# Patient Record
Sex: FEMALE | Race: WHITE | NOT HISPANIC OR LATINO | Employment: OTHER | URBAN - METROPOLITAN AREA
[De-identification: names, ages, dates, MRNs, and addresses within clinical notes are randomized per-mention and may not be internally consistent; named-entity substitution may affect disease eponyms.]

---

## 2017-01-12 ENCOUNTER — ALLSCRIPTS OFFICE VISIT (OUTPATIENT)
Dept: OTHER | Facility: OTHER | Age: 77
End: 2017-01-12

## 2017-01-12 DIAGNOSIS — E11.65 TYPE 2 DIABETES MELLITUS WITH HYPERGLYCEMIA (HCC): ICD-10-CM

## 2017-01-17 ENCOUNTER — ALLSCRIPTS OFFICE VISIT (OUTPATIENT)
Dept: OTHER | Facility: OTHER | Age: 77
End: 2017-01-17

## 2017-01-24 ENCOUNTER — GENERIC CONVERSION - ENCOUNTER (OUTPATIENT)
Dept: OTHER | Facility: OTHER | Age: 77
End: 2017-01-24

## 2017-02-08 ENCOUNTER — ALLSCRIPTS OFFICE VISIT (OUTPATIENT)
Dept: OTHER | Facility: OTHER | Age: 77
End: 2017-02-08

## 2017-02-10 ENCOUNTER — GENERIC CONVERSION - ENCOUNTER (OUTPATIENT)
Dept: OTHER | Facility: OTHER | Age: 77
End: 2017-02-10

## 2017-02-10 ENCOUNTER — APPOINTMENT (OUTPATIENT)
Dept: LAB | Facility: CLINIC | Age: 77
End: 2017-02-10
Payer: MEDICARE

## 2017-02-10 DIAGNOSIS — E11.65 TYPE 2 DIABETES MELLITUS WITH HYPERGLYCEMIA (HCC): ICD-10-CM

## 2017-02-10 LAB
ALBUMIN SERPL BCP-MCNC: 3.2 G/DL (ref 3.5–5)
ALP SERPL-CCNC: 53 U/L (ref 46–116)
ALT SERPL W P-5'-P-CCNC: 18 U/L (ref 12–78)
ANION GAP SERPL CALCULATED.3IONS-SCNC: 10 MMOL/L (ref 4–13)
AST SERPL W P-5'-P-CCNC: 8 U/L (ref 5–45)
BILIRUB SERPL-MCNC: 0.7 MG/DL (ref 0.2–1)
BUN SERPL-MCNC: 8 MG/DL (ref 5–25)
CALCIUM SERPL-MCNC: 8.5 MG/DL (ref 8.3–10.1)
CHLORIDE SERPL-SCNC: 103 MMOL/L (ref 100–108)
CHOLEST SERPL-MCNC: 159 MG/DL (ref 50–200)
CO2 SERPL-SCNC: 26 MMOL/L (ref 21–32)
CREAT SERPL-MCNC: 0.57 MG/DL (ref 0.6–1.3)
EST. AVERAGE GLUCOSE BLD GHB EST-MCNC: 166 MG/DL
GFR SERPL CREATININE-BSD FRML MDRD: >60 ML/MIN/1.73SQ M
GLUCOSE SERPL-MCNC: 186 MG/DL (ref 65–140)
HBA1C MFR BLD: 7.4 % (ref 4.2–6.3)
HDLC SERPL-MCNC: 79 MG/DL (ref 40–60)
LDLC SERPL CALC-MCNC: 72 MG/DL (ref 0–100)
POTASSIUM SERPL-SCNC: 3.8 MMOL/L (ref 3.5–5.3)
PROT SERPL-MCNC: 6.7 G/DL (ref 6.4–8.2)
SODIUM SERPL-SCNC: 139 MMOL/L (ref 136–145)
TRIGL SERPL-MCNC: 42 MG/DL

## 2017-02-10 PROCEDURE — 80061 LIPID PANEL: CPT

## 2017-02-10 PROCEDURE — 83036 HEMOGLOBIN GLYCOSYLATED A1C: CPT

## 2017-02-10 PROCEDURE — 36415 COLL VENOUS BLD VENIPUNCTURE: CPT

## 2017-02-10 PROCEDURE — 80053 COMPREHEN METABOLIC PANEL: CPT

## 2017-02-17 ENCOUNTER — ALLSCRIPTS OFFICE VISIT (OUTPATIENT)
Dept: OTHER | Facility: OTHER | Age: 77
End: 2017-02-17

## 2017-02-17 DIAGNOSIS — Z13.820 ENCOUNTER FOR SCREENING FOR OSTEOPOROSIS: ICD-10-CM

## 2017-02-17 DIAGNOSIS — E11.65 TYPE 2 DIABETES MELLITUS WITH HYPERGLYCEMIA (HCC): ICD-10-CM

## 2017-02-17 DIAGNOSIS — Z12.31 ENCOUNTER FOR SCREENING MAMMOGRAM FOR MALIGNANT NEOPLASM OF BREAST: ICD-10-CM

## 2017-03-08 ENCOUNTER — GENERIC CONVERSION - ENCOUNTER (OUTPATIENT)
Dept: OTHER | Facility: OTHER | Age: 77
End: 2017-03-08

## 2017-03-16 ENCOUNTER — GENERIC CONVERSION - ENCOUNTER (OUTPATIENT)
Dept: OTHER | Facility: OTHER | Age: 77
End: 2017-03-16

## 2017-04-06 ENCOUNTER — GENERIC CONVERSION - ENCOUNTER (OUTPATIENT)
Dept: OTHER | Facility: OTHER | Age: 77
End: 2017-04-06

## 2017-04-12 ENCOUNTER — GENERIC CONVERSION - ENCOUNTER (OUTPATIENT)
Dept: OTHER | Facility: OTHER | Age: 77
End: 2017-04-12

## 2017-04-21 ENCOUNTER — APPOINTMENT (OUTPATIENT)
Dept: LAB | Facility: CLINIC | Age: 77
End: 2017-04-21
Payer: MEDICARE

## 2017-04-21 ENCOUNTER — GENERIC CONVERSION - ENCOUNTER (OUTPATIENT)
Dept: OTHER | Facility: OTHER | Age: 77
End: 2017-04-21

## 2017-04-21 DIAGNOSIS — E11.65 TYPE 2 DIABETES MELLITUS WITH HYPERGLYCEMIA (HCC): ICD-10-CM

## 2017-04-21 LAB
ALBUMIN SERPL BCP-MCNC: 3 G/DL (ref 3.5–5)
ALP SERPL-CCNC: 48 U/L (ref 46–116)
ALT SERPL W P-5'-P-CCNC: 21 U/L (ref 12–78)
ANION GAP SERPL CALCULATED.3IONS-SCNC: 8 MMOL/L (ref 4–13)
AST SERPL W P-5'-P-CCNC: 8 U/L (ref 5–45)
BILIRUB SERPL-MCNC: 0.66 MG/DL (ref 0.2–1)
BUN SERPL-MCNC: 10 MG/DL (ref 5–25)
CALCIUM SERPL-MCNC: 8.6 MG/DL (ref 8.3–10.1)
CHLORIDE SERPL-SCNC: 103 MMOL/L (ref 100–108)
CO2 SERPL-SCNC: 26 MMOL/L (ref 21–32)
CREAT SERPL-MCNC: 0.53 MG/DL (ref 0.6–1.3)
EST. AVERAGE GLUCOSE BLD GHB EST-MCNC: 151 MG/DL
GFR SERPL CREATININE-BSD FRML MDRD: >60 ML/MIN/1.73SQ M
GLUCOSE P FAST SERPL-MCNC: 117 MG/DL (ref 65–99)
HBA1C MFR BLD: 6.9 % (ref 4.2–6.3)
POTASSIUM SERPL-SCNC: 4 MMOL/L (ref 3.5–5.3)
PROT SERPL-MCNC: 6.5 G/DL (ref 6.4–8.2)
SODIUM SERPL-SCNC: 137 MMOL/L (ref 136–145)

## 2017-04-21 PROCEDURE — 36415 COLL VENOUS BLD VENIPUNCTURE: CPT

## 2017-04-21 PROCEDURE — 80053 COMPREHEN METABOLIC PANEL: CPT

## 2017-04-21 PROCEDURE — 83036 HEMOGLOBIN GLYCOSYLATED A1C: CPT

## 2017-04-25 ENCOUNTER — ALLSCRIPTS OFFICE VISIT (OUTPATIENT)
Dept: OTHER | Facility: OTHER | Age: 77
End: 2017-04-25

## 2017-04-25 DIAGNOSIS — E11.65 TYPE 2 DIABETES MELLITUS WITH HYPERGLYCEMIA (HCC): ICD-10-CM

## 2017-05-16 ENCOUNTER — ALLSCRIPTS OFFICE VISIT (OUTPATIENT)
Dept: OTHER | Facility: OTHER | Age: 77
End: 2017-05-16

## 2017-08-02 ENCOUNTER — GENERIC CONVERSION - ENCOUNTER (OUTPATIENT)
Dept: OTHER | Facility: OTHER | Age: 77
End: 2017-08-02

## 2017-08-09 ENCOUNTER — ALLSCRIPTS OFFICE VISIT (OUTPATIENT)
Dept: OTHER | Facility: OTHER | Age: 77
End: 2017-08-09

## 2017-08-09 DIAGNOSIS — E11.65 TYPE 2 DIABETES MELLITUS WITH HYPERGLYCEMIA (HCC): ICD-10-CM

## 2017-10-18 ENCOUNTER — GENERIC CONVERSION - ENCOUNTER (OUTPATIENT)
Dept: OTHER | Facility: OTHER | Age: 77
End: 2017-10-18

## 2017-11-13 ENCOUNTER — APPOINTMENT (OUTPATIENT)
Dept: LAB | Facility: CLINIC | Age: 77
End: 2017-11-13
Payer: MEDICARE

## 2017-11-13 ENCOUNTER — GENERIC CONVERSION - ENCOUNTER (OUTPATIENT)
Dept: OTHER | Facility: OTHER | Age: 77
End: 2017-11-13

## 2017-11-13 DIAGNOSIS — E11.65 TYPE 2 DIABETES MELLITUS WITH HYPERGLYCEMIA (HCC): ICD-10-CM

## 2017-11-13 LAB
ALBUMIN SERPL BCP-MCNC: 3.3 G/DL (ref 3.5–5)
ALP SERPL-CCNC: 57 U/L (ref 46–116)
ALT SERPL W P-5'-P-CCNC: 15 U/L (ref 12–78)
ANION GAP SERPL CALCULATED.3IONS-SCNC: 8 MMOL/L (ref 4–13)
AST SERPL W P-5'-P-CCNC: 14 U/L (ref 5–45)
BASOPHILS # BLD AUTO: 0.04 THOUSANDS/ΜL (ref 0–0.1)
BASOPHILS NFR BLD AUTO: 1 % (ref 0–1)
BILIRUB SERPL-MCNC: 0.82 MG/DL (ref 0.2–1)
BUN SERPL-MCNC: 12 MG/DL (ref 5–25)
CALCIUM SERPL-MCNC: 9.2 MG/DL (ref 8.3–10.1)
CHLORIDE SERPL-SCNC: 101 MMOL/L (ref 100–108)
CHOLEST SERPL-MCNC: 157 MG/DL (ref 50–200)
CO2 SERPL-SCNC: 26 MMOL/L (ref 21–32)
CREAT SERPL-MCNC: 0.7 MG/DL (ref 0.6–1.3)
EOSINOPHIL # BLD AUTO: 0.08 THOUSAND/ΜL (ref 0–0.61)
EOSINOPHIL NFR BLD AUTO: 1 % (ref 0–6)
ERYTHROCYTE [DISTWIDTH] IN BLOOD BY AUTOMATED COUNT: 15.4 % (ref 11.6–15.1)
EST. AVERAGE GLUCOSE BLD GHB EST-MCNC: 146 MG/DL
GFR SERPL CREATININE-BSD FRML MDRD: 83 ML/MIN/1.73SQ M
GLUCOSE P FAST SERPL-MCNC: 184 MG/DL (ref 65–99)
HBA1C MFR BLD: 6.7 % (ref 4.2–6.3)
HCT VFR BLD AUTO: 39.1 % (ref 34.8–46.1)
HDLC SERPL-MCNC: 74 MG/DL (ref 40–60)
HGB BLD-MCNC: 12.8 G/DL (ref 11.5–15.4)
LDLC SERPL CALC-MCNC: 65 MG/DL (ref 0–100)
LYMPHOCYTES # BLD AUTO: 1.92 THOUSANDS/ΜL (ref 0.6–4.47)
LYMPHOCYTES NFR BLD AUTO: 31 % (ref 14–44)
MCH RBC QN AUTO: 30.1 PG (ref 26.8–34.3)
MCHC RBC AUTO-ENTMCNC: 32.7 G/DL (ref 31.4–37.4)
MCV RBC AUTO: 92 FL (ref 82–98)
MONOCYTES # BLD AUTO: 0.47 THOUSAND/ΜL (ref 0.17–1.22)
MONOCYTES NFR BLD AUTO: 8 % (ref 4–12)
NEUTROPHILS # BLD AUTO: 3.67 THOUSANDS/ΜL (ref 1.85–7.62)
NEUTS SEG NFR BLD AUTO: 59 % (ref 43–75)
NRBC BLD AUTO-RTO: 0 /100 WBCS
PLATELET # BLD AUTO: 274 THOUSANDS/UL (ref 149–390)
PMV BLD AUTO: 10.2 FL (ref 8.9–12.7)
POTASSIUM SERPL-SCNC: 4.2 MMOL/L (ref 3.5–5.3)
PROT SERPL-MCNC: 7 G/DL (ref 6.4–8.2)
RBC # BLD AUTO: 4.25 MILLION/UL (ref 3.81–5.12)
SODIUM SERPL-SCNC: 135 MMOL/L (ref 136–145)
TRIGL SERPL-MCNC: 89 MG/DL
WBC # BLD AUTO: 6.22 THOUSAND/UL (ref 4.31–10.16)

## 2017-11-13 PROCEDURE — 80053 COMPREHEN METABOLIC PANEL: CPT

## 2017-11-13 PROCEDURE — 85025 COMPLETE CBC W/AUTO DIFF WBC: CPT

## 2017-11-13 PROCEDURE — 83036 HEMOGLOBIN GLYCOSYLATED A1C: CPT

## 2017-11-13 PROCEDURE — 36415 COLL VENOUS BLD VENIPUNCTURE: CPT

## 2017-11-13 PROCEDURE — 80061 LIPID PANEL: CPT

## 2017-11-16 ENCOUNTER — ALLSCRIPTS OFFICE VISIT (OUTPATIENT)
Dept: OTHER | Facility: OTHER | Age: 77
End: 2017-11-16

## 2017-11-16 DIAGNOSIS — E78.5 HYPERLIPIDEMIA: ICD-10-CM

## 2017-11-17 NOTE — PROGRESS NOTES
Assessment    1  Diabetes mellitus type 2, uncontrolled (250 02) (E11 65)   2  Hypertension (401 9) (I10)   3  Hyperlipidemia (272 4) (E78 5)    Plan  Hyperlipidemia    · (1) CBC/PLT/DIFF; Status:Active; Requested for:16Nov2017;    · (1) COMPREHENSIVE METABOLIC PANEL; Status:Active; Requested for:16Nov2017;    · (1) LIPID PANEL, FASTING; Status:Active; Requested for:16Nov2017;    · Follow-up visit in 4 Months Evaluation and Treatment  Follow-up  Status: Hold For - Scheduling Requested for: 66DKH8035  Need for pneumococcal vaccination    · Pneumovax 23 25 MCG/0 5ML Injection Injectable    Discussion/Summary  Discussion Summary:   Chronic problems are stable  No changes in meds  Followup with specialists  Continue diet and exercise  Counseling Documentation With Imm: The patient was counseled regarding instructions for management,-- impressions  Medication SE Review and Pt Understands Tx: Possible side effects of new medications were reviewed with the patient/guardian today  The treatment plan was reviewed with the patient/guardian  The patient/guardian understands and agrees with the treatment plan   Self Referrals:   Self Referrals: No      Chief Complaint  Chief Complaint Free Text Note Form: Patient is here today for a routine follow up and lab review  History of Present Illness  HPI: Patient was in today for routine follow-up  Her blood work shows her sugars are doing even better  They're now in the normal range  Her blood pressure remains controlled  Cholesterol is controlled on her blood work  She is trying to watch her diet but she admits she still cheats because she does not want to lose further weight  Her stomach continues to do well since the procedure  Review of Systems  Complete-Female:  Cardiovascular: no chest pain  Respiratory: no shortness of breath  Gastrointestinal: no abdominal pain  Active Problems  1   Allergic rhinitis (477 9) (J30 9)   2  Carotid stenosis, bilateral (433 10,433 30) (I65 23)   3  Carpal tunnel syndrome on both sides (354 0) (G56 03)   4  Cerebral infarction (434 91) (I63 9)   5  Chronic pain (338 29) (G89 29)   6  Depression (311) (F32 9)   7  Diabetes mellitus type 2, uncontrolled (250 02) (E11 65)   8  Diabetic peripheral neuropathy (250 60,357 2) (E11 42)   9  Dry mouth (527 7) (R68 2)   10  Encounter for long-term (current) drug use (V58 69) (Z79 899)   11  Epilepsy (345 90) (G40 909)   12  Gait disturbance (781 2) (R26 9)   13  History of CVA (cerebrovascular accident) (V12 54) (Z86 73)   14  Hyperlipidemia (272 4) (E78 5)   15  Hypertension (401 9) (I10)   16  Leg pain (729 5) (M79 606)   17  Mesenteric ischemia (557 9) (K55 9)   18  Need for pneumococcal vaccination (V03 82) (Z23)   19  Need for Tdap vaccination (V06 1) (Z23)   20  Peripheral neuropathy (356 9) (G62 9)   21  Seborrheic keratosis (702 19) (L82 1)   22  Skin lesion (709 9) (L98 9)   23  Trigger little finger of right hand (727 03) (M65 351)   24  Urinary incontinence in female (788 30) (R32)   25  Vertigo (780 4) (R42)   26  Visit for screening mammogram (V76 12) (Z12 31)   27  Vitamin D deficiency (268 9) (E55 9)    Past Medical History  1  History of Diverticulosis of large intestine without hemorrhage (562 10) (K57 30)   2  History of colonic polyps (V12 72) (Z86 010)   3  History of diarrhea (V12 79) (Z87 898)   4  History of Screening for genitourinary condition (V81 6) (Z13 89)   5  History of Screening for osteoporosis (V82 81) (Z13 820)   6  History of Screening for skin condition (V82 0) (Z13 89)  Active Problems And Past Medical History Reviewed: The active problems and past medical history were reviewed and updated today  Surgical History  1  History of Breast Surgery Lumpectomy   2  History of Cath Stent Placement   3  History of Cath Stent Placement   4  History of Coronary Artery Triple Arterial Bypass Graft   5  History of Endarterectomy Carotid Artery   6   History of Tonsillectomy    Family History  Mother    1  Family history of lung cancer (V16 1) (Z80 1)   2  Family history of Headache  Brother    3  Family history of cerebrovascular accident (CVA) (V17 1) (Z82 3)    Social History     · Does not use illicit drugs (C18 27) (Z78 9)   · Former smoker (L83 43) (W79 696)   · Retired   ·     Current Meds   1  AmLODIPine Besylate 5 MG Oral Tablet; take 1 tablet by mouth once daily; Therapy: 51HQB4898 to (Evaluate:34Tju4536)  Requested for: 37JKP1160; Last Rx:14Nov2017 Ordered   2  Aspirin 81 MG TABS; Therapy: (Recorded:33Lso8813) to Recorded   3  Clopidogrel Bisulfate 75 MG Oral Tablet; take 1 tablet by mouth once daily; Therapy: 13CCB4282 to (Evaluate:34Rzd4217)  Requested for: 29SHG8836; Last Rx:14Nov2017 Ordered   4  Colon Herbal Cleanser Oral Capsule; TAKE 1 CAPSULE DAILY; Therapy: 02Jun2016 to Recorded   5  CoQ-10 CAPS; Therapy: (Recorded:19Nov2015) to Recorded   6  DULoxetine HCl - 60 MG Oral Capsule Delayed Release Particles; TAKE 1 CAPSULE TWICE DAILY; Therapy: 48Mvr1015 to (Evaluate:37Vvk1466)  Requested for: 95Sez2385; Last Rx:87Vnf4536 Ordered   7  Fiber POWD; Therapy: (Recorded:16May2017) to Recorded   8  Gabapentin 100 MG Oral Capsule; take 1 capsule by mouth three times a day; Therapy: 80Egn0075 to (Evaluate:63Eue7213)  Requested for: 15Oct2017; Last Rx:15Oct2017 Ordered   9  Glimepiride 4 MG Oral Tablet; take 1 tablet twice a day; Therapy: 93Dzz8500 to (Evaluate:01Ndy7166)  Requested for: 46JJY2854; Last Rx:18Jun2017 Ordered   10  Hydrocodone-Acetaminophen 5-325 MG Oral Tablet; TAKE 1 TABLET EVERY 6 HOURS AS NEEDED; Therapy: 77SOY2452 to (Evaluate:55Wmz9475); Last Rx:20Jan2015 Ordered   11  Lisinopril 20 MG Oral Tablet; take 1 tablet by mouth once daily; Therapy: 43TDN7678 to (Evaluate:33Dag1257)  Requested for: 02QYH8605; Last Rx:14Nov2017  Ordered   12  MetFORMIN HCl - 500 MG Oral Tablet; take 2 tablets by mouth twice a day;   Therapy: 78CXG0687 to (MBBNZURK:11NZD7764)  Requested for: 97WQV1322; Last Rx:34Wuq0957  Ordered   13  Simvastatin 40 MG Oral Tablet; take 1 tablet by mouth once daily; Therapy: 11YCE2131 to (Evaluate:23Osy6587)  Requested for: 54KSD7386; Last Rx:87Rpr9250  Ordered   14  VESIcare 5 MG Oral Tablet; take 1 tablet by mouth once daily; Therapy: 90GKW6347 to (Evaluate:99Uye7907)  Requested for: 41UHT2574; Last Rx:67Gla8716  Ordered   15  Vitamin B12 TABS; Therapy: (Recorded:59Pic3534) to Recorded   16  Vitamin D3 2000 UNIT Oral Tablet; Therapy: (Recorded:19Zyx9860) to Recorded  Medication List Reviewed: The medication list was reviewed and updated today  Allergies  1  Darvon CAPS   2  Penicillin V Potassium TABS  3  No Known Environmental Allergies   4  No Known Food Allergies    Vitals  Vital Signs    Recorded: 46HAE5046 12:20PM Recorded: 47WPI6089 11:34AM   Temperature  96 8 F   Heart Rate  61   Systolic  994   Diastolic  70   Height  4 ft 11 in   Weight  123 lb    BMI Calculated  24 84   BSA Calculated  1 5   O2 Saturation 95 84       Physical Exam   Constitutional  General appearance: No acute distress, well appearing and well nourished  Pulmonary  Respiratory effort: No increased work of breathing or signs of respiratory distress  Auscultation of lungs: Clear to auscultation  Cardiovascular  Auscultation of heart: Normal rate and rhythm, normal S1 and S2, without murmurs  Examination of extremities for edema and/or varicosities: Normal    Abdomen  Abdomen: Non-tender, no masses  Liver and spleen: No hepatomegaly or splenomegaly  Psychiatric  Orientation to person, place, and time: Normal    Mood and affect: Normal          Health Management  Visit for screening mammogram   Digital Bilateral Screening Mammogram With CAD; every 1 year; Last 26Feb2015; Next [facility]:74AFQ4508;  Overdue    Signatures   Electronically signed by : Carla Migeul MD; Nov 16 2017 12:23PM EST                       (Author)

## 2018-01-11 NOTE — PROGRESS NOTES
Assessment    1  Encounter for preventive health examination (V70 0) (Z00 00)    Plan   Diabetes mellitus type 2, uncontrolled    · GlyBURIDE 5 MG Oral Tablet; TAKE 2 TABLETS TWICE DAILY  Health Maintenance    · Nicotine Step 3 7 MG/24HR Transdermal Patch 24 Hour; APPLY 1 PATCH DAILY AS  DIRECTED    Diabetes mellitus type 2, uncontrolled (250 02) (E11 65)          Discussion/Summary  Impression: Subsequent Annual Wellness Visit, with preventive exam as well as age and risk appropriate counseling completed  Cardiovascular screening and counseling: screening is current  Diabetes screening and counseling: screening is current  Colorectal cancer screening and counseling: screening not indicated  Breast cancer screening and counseling: screening is current  Cervical cancer screening and counseling: screening not indicated  Glaucoma screening and counseling: screening is current  Immunizations: influenza vaccine is up to date this year and the lifetime pneumococcal vaccine has been completed  Advance Directive Planning: she was encouraged to follow-up with me to discuss her questions and/or decisions, Asked her to bring us a copy of her living will to scan into the chart  Patient Discussion: plan discussed with the patient, follow-up visit needed in one year  Self Referrals: No      Chief Complaint  Medicare Wellness      Advance Directives  Advance Directive [facility]:   YES - Patient has an advance health care directive  The patient has a living will located  in patient's home  History of Present Illness  The patient is being seen for the initial annual wellness visit and Her questionnaire was reviewed with her and a copy is in the chart  Medicare Screening and Risk Factors   Hospitalizations: no previous hospitalizations  Medicare Screening Tests Risk Questions   Drug and Alcohol Use: The patient is a current cigarette smoker  The patient reports occasional alcohol use   She has never used illicit drugs  Diet and Physical Activity: Current diet includes well balanced meals and low salt food choices  She exercises 7 times per week  Exercise: walking, strength training  Mood Disorder and Cognitive Impairment Screening: She denies feeling down, depressed, or hopeless over the past two weeks  She denies feeling little interest or pleasure in doing things over the past two weeks  Cognitive impairment screening: denies difficulty learning/retaining new information, denies difficulty handling complex tasks, denies difficulty with reasoning, denies difficulty with spatial ability and orientation, denies difficulty with language and denies difficulty with behavior  Functional Ability/Level of Safety: She denies hearing difficulties  Activities of daily living details: does not need help using the phone, no transportation help needed, does not need help shopping, no meal preparation help needed, does not need help doing housework, does not need help doing laundry, does not need help managing medications and does not need help managing money  Fall risk factors: The patient fell 1 times in the past 12 months  Home safety risk factors:  no grab bars in the bathroom and no handrails on the stairs, but no unfamiliar surroundings, no loose rugs, no poor household lighting, no uneven floors and no household clutter  Advance Directives: Advance directives: living will, but no durable power of  for health care directives and no advance directives  end of life decisions were reviewed with the patient  Co-Managers and Medical Equipment/Suppliers: See Patient Care Team   Falls Risk: The patient fell 1 times in the past 12 months  The patient currently has no urinary incontinence symptoms  Patient Care Team    Care Team Member Role Specialty Office Number   Angelica Chen MD Specialist Neurology [phone]   Radha ARTHUR    Dermatology [phone]   Jaime Nelson   [phone]     Review of Systems    Cardiovascular: no chest pain  Respiratory: no shortness of breath  Active Problems     1  Allergic rhinitis (477 9) (J30 9)   2  Carotid stenosis, bilateral (433 10,433 30) (I65 23)   3  Chronic pain (338 29) (G89 29)   4  Depression (311) (F32 9)   5  Dry mouth (527 7) (R68 2)   6  Encounter for long-term (current) drug use (V58 69) (Z79 899)   7  Epilepsy (345 90) (G40 909)   8  Gait disturbance (781 2) (R26 9)   9  Leg pain (729 5) (M79 606)   10  Need for pneumococcal vaccination (V03 82) (Z23)   11  Need for Tdap vaccination (V06 1) (Z23)   12  Peripheral neuropathy (356 9) (G62 9)   13  Screening for genitourinary condition (V81 6) (Z13 89)   14  Screening for skin condition (V82 0) (Z13 89)   15  Seborrheic keratosis (702 19) (L82 1)   16  Skin lesion (709 9) (L98 9)   17  Trigger little finger of right hand (727 03) (M65 351)   18  Urinary incontinence in female (788 30) (R32)   19  Vertigo (780 4) (R42)   20  Visit for screening mammogram (V76 12) (Z12 31)   21  Vitamin D deficiency (268 9) (E55 9)    Hypertension (401 9) (I10)       Hyperlipidemia (272 4) (E78 5)       Diabetes mellitus type 2, uncontrolled (250 02) (E11 65)       Cerebral infarction (434 91) (I63 9)     - L frontal on MRI 5/16          Past Medical History    1  History of Diverticulosis of large intestine without hemorrhage (562 10) (K57 30)   2  History of colonic polyps (V12 72) (Z86 010)   3  History of diarrhea (V12 79) (Z87 898)    The active problems and past medical history were reviewed and updated today  Surgical History    1  History of Breast Surgery Lumpectomy   2  History of Cath Stent Placement   3  History of Coronary Artery Triple Arterial Bypass Graft   4  History of Endarterectomy Carotid Artery   5  History of Tonsillectomy    The surgical history was reviewed and updated today  Family History  Mother    1  Family history of lung cancer (V16 1) (Z80 1)   2   Family history of Headache  Brother    3  Family history of cerebrovascular accident (CVA) (V17 1) (Z82 3)    The family history was reviewed and updated today  Social History    · Retired   ·   The social history was reviewed and updated today  Current Meds   1  AmLODIPine Besylate 5 MG Oral Tablet; take 1 tablet by mouth once daily; Therapy: 71MVP9108 to (Evaluate:05Qga9673)  Requested for: 46KKS3561; Last   Rx:23Oct2016 Ordered   2  Aspirin 81 MG TABS; Therapy: (Recorded:06Elw6001) to Recorded   3  Cilostazol 100 MG Oral Tablet; TAKE 1 TABLET TWICE DAILY; Therapy: 00QJO8344 to (Evaluate:78Hgk3827)  Requested for: 42Fvc6434; Last   Rx:37Mkh9040; Status: ACTIVE - Transmit to Pharmacy - Awaiting Verification Ordered   4  Clopidogrel Bisulfate 75 MG Oral Tablet; take one tablet by mouth daily; Therapy: 22IQS2076 to (Evaluate:14Oct2016)  Requested for: 47Cie4206; Last   Rx:96Xer3988 Ordered   5  Colon Herbal Cleanser Oral Capsule; TAKE 1 CAPSULE DAILY; Therapy: 02Jun2016 to Recorded   6  CoQ-10 CAPS; Therapy: (Recorded:79Wdr0689) to Recorded   7  DULoxetine HCl - 60 MG Oral Capsule Delayed Release Particles; TAKE 1 CAPSULE   TWICE DAILY; Therapy: 11Qkq1913 to (Evaluate:54Opq8272)  Requested for: 65Iuc7663; Last   Rx:88Ygl6292; Status: ACTIVE - Transmit to Pharmacy - Awaiting Verification Ordered   8  Gabapentin 100 MG Oral Capsule; take 1 capsule by mouth three times a day; Therapy: 28Qjr7405 to (Evaluate:32Jvz6583)  Requested for: 23Oct2016; Last   Rx:23Oct2016 Ordered   9  GlyBURIDE 5 MG Oral Tablet; TAKE 1 TABLET 3 times daily; Therapy: 87SHZ0505 to (Evaluate:00Kjf0545)  Requested for: 23Eiz8811; Last   Rx:87Ris7554; Status: ACTIVE - Transmit to [facility] Ordered   10  Hydrocodone-Acetaminophen 5-325 MG Oral Tablet; TAKE 1 TABLET EVERY 6 HOURS    AS NEEDED; Therapy: 24NRT1912 to (Evaluate:89Pim7538); Last Rx:20Jan2015 Ordered   11   Lisinopril 20 MG Oral Tablet; take 1 tablet by mouth once daily; Therapy: 57BDR7864 to (Evaluate:21Apr2017)  Requested for: 23Oct2016; Last    Rx:23Oct2016 Ordered   12  MetFORMIN HCl - 500 MG Oral Tablet; take 2 tablets by mouth twice a day; Therapy: 87KJD0050 to (Evaluate:21Mar2017)  Requested for: 21Nov2016; Last    Rx:21Nov2016 Ordered   13  Simvastatin 40 MG Oral Tablet; take 1 tablet by mouth daily; Therapy: 05KXK7751 to (Evaluate:21Apr2017)  Requested for: 23Oct2016; Last    Rx:23Oct2016 Ordered   14  VESIcare 5 MG Oral Tablet; Take 1 tablet daily; Therapy: 27GJU9113 to (Leah Sera)  Requested for: 09Aug2016; Last    Rx:62Hri3380 Ordered   15  Vitamin B12 TABS; Therapy: (Recorded:19Nov2015) to Recorded   16  Vitamin D3 2000 UNIT Oral Tablet; Therapy: (Recorded:29Epm1086) to Recorded    The medication list was reviewed and updated today  Allergies    1  Darvon CAPS   2  Penicillin V Potassium TABS    Immunizations  Influenza --- Earl Morrison: Oct 2015   PCV --- Earl Morrison: 14-Oct-2015   Tdap --- Earl Morrison: Temporarily Deferred: Pt refuses, As of: 22OKL6753, Defer for 1 Years     Vitals  Signs   Recorded: 23Nov2016 01:41PM   Heart Rate: 021  Systolic: 816  Diastolic: 68  Height: 4 ft 10 in  Weight: 142 lb 6 08 oz  BMI Calculated: 29 76  BSA Calculated: 1 57  O2 Saturation: 95    Health Management  Visit for screening mammogram   Digital Bilateral Screening Mammogram With CAD; every 1 year; Last 26Feb2015; Next Due:  24Zot6995;  Overdue    Future Appointments    Date/Time Provider Specialty Site   12/27/2016 01:20 PM Lory Burnett MD Internal Medicine [facility] [address] INTERNAL MED   01/17/2017 01:35 PM Oleh Claude, MD Neurology [facility] [address]   Electronically signed by : Nona Breaux MD; Nov 23 2016  2:22PM EST                       (Author)

## 2018-01-11 NOTE — RESULT NOTES
Verified Results  (1) COMPREHENSIVE METABOLIC PANEL 89NKY6414 51:53RG Stefan Lousi Order Number: [id number]     Test Name Result Flag Reference   SODIUM 137 mmol/L  136-145   POTASSIUM 4 0 mmol/L  3 5-5 3   CHLORIDE 103 mmol/L  100-108   CARBON DIOXIDE 26 mmol/L  21-32   ANION GAP (CALC) 8 mmol/L  4-13   BLOOD UREA NITROGEN 10 mg/dL  5-25   CREATININE 0 53 mg/dL L 0 60-1 30   Standardized to IDMS reference method   CALCIUM 8 6 mg/dL  8 3-10 1   BILI, TOTAL 0 66 mg/dL  0 20-1 00   ALK PHOSPHATAS 48 U/L     ALT (SGPT) 21 U/L  12-78   AST(SGOT) 8 U/L  5-45   ALBUMIN 3 0 g/dL L 3 5-5 0   TOTAL PROTEIN 6 5 g/dL  6 4-8 2   eGFR Non-African American      >60 0 ml/min/1 73sq [facility] Disease Education Program recommendations are as follows:  GFR calculation is accurate only with a steady state creatinine  Chronic Kidney disease less than 60 ml/min/1 73 sq  meters  Kidney failure less than 15 ml/min/1 73 sq  meters  GLUCOSE FASTING 117 mg/dL H 65-99     (1) HEMOGLOBIN A1C 21Apr2017 11:16AM Stefan Louis Order Number: [id number]     Test Name Result Flag Reference   HEMOGLOBIN A1C 6 9 % H 4 2-6 3   EST  AVG   GLUCOSE 151 mg/dl

## 2018-01-12 VITALS
BODY MASS INDEX: 24.8 KG/M2 | OXYGEN SATURATION: 77 % | SYSTOLIC BLOOD PRESSURE: 134 MMHG | HEART RATE: 71 BPM | HEIGHT: 59 IN | WEIGHT: 123 LBS | DIASTOLIC BLOOD PRESSURE: 60 MMHG

## 2018-01-13 VITALS
OXYGEN SATURATION: 95 % | WEIGHT: 131.25 LBS | HEART RATE: 91 BPM | DIASTOLIC BLOOD PRESSURE: 58 MMHG | HEIGHT: 58 IN | BODY MASS INDEX: 27.55 KG/M2 | SYSTOLIC BLOOD PRESSURE: 124 MMHG

## 2018-01-13 VITALS
HEIGHT: 59 IN | BODY MASS INDEX: 24.8 KG/M2 | TEMPERATURE: 96.8 F | HEART RATE: 61 BPM | DIASTOLIC BLOOD PRESSURE: 70 MMHG | SYSTOLIC BLOOD PRESSURE: 108 MMHG | WEIGHT: 123 LBS | OXYGEN SATURATION: 95 %

## 2018-01-13 NOTE — PROCEDURES
Results/Data    Procedure: Electromyogram and Nerve Conduction Study  Indication: Bilateral Upper Extremities   Referred by Dr Magali Coreas  The procedure's were discussed with the patient  Written consent was obtained prior to the procedure and is detailed in the patient's record  Prior to the start of the procedure a time out was taken and the identity of the patient was confirmed via name and date of birth with the patient  The correct site and the procedure to be performed were confirmed  The correct side was confirmed if applicable  The positioning of the patient was verified  The availability of the correct equipment was verified  Procedure Start Time: 11:30    Technique: A sterile concentric needle electrode was used  The patient tolerated the procedure well  There were no complications  Results : Motor and sensory nerve conduction studies were performed on the bilateral median and ulnar nerves  The right median motor terminal latency was prolonged with a normal compound motor action potential amplitude and a normal conduction velocity across the wrist  The left median motor terminal latency was prolonged with a normal compound motor action potential amplitude and a slow conduction velocity across the wrist  The right ulnar compound motor action potential was within normal limits  The left ulnar motor terminal latency was within normal limits with a normal compound motor action potential amplitude and a slow conduction velocity across the wrist but a normal conduction velocity across the elbow  The left median F wave was prolonged as compared to the right  The bilateral ulnar F wave latencies were within normal limits  The right median sensory peak latency was prolonged with a low sensory action potential amplitude  The left median sensory peak latency was prolonged with a low sensory action potential amplitude   The bilateral ulnar sensory peak latency was prolonged with a normal sensory action potential amplitude  The right median palmar evoked response was prolonged by 2 5 ms as compared to the right ulnar palmar evoked response at the same distance  The left median palmar evoked response was prolonged by 3 4 ms as compared to the left ulnar palmar evoked response at the same distance  Concentric needle examination was performed on various proximal and distal muscles bilaterally including deltoid, biceps, triceps, pronator teres, APB, FDI and low cervical paraspinals  There was no evidence of active denervation in any of the muscles tested  Mild-to-moderate decreased recruitment of giant motor units was noted in the bilateral abductor pollicis brevis  The compound motor unit action potentials were of normal configuration with interference patterns being full or full for effort in the remaining muscles tested  Interpretation: There is electrophysiologic evidence of a:    1  Bilateral moderate median nerve compression neuropathy at the wrist with demyelinative changes, left worse than the right,consistent with a diagnosis of carpal tunnel syndrome  2 Bilateral mild ulnar sensory neuropathy at the wrist with demyelinative changes as evidenced by the prolonged ulnar sensory peak latency  3  There is no evidence of a cervical radiculopathy bilaterally  4  A coexistent sensorimotor peripheral neuropathy with demyelinative changes cannot be completely excluded  Clinical correlation is recommended        Signatures   Electronically signed by : Danielle Wright MD; Feb 8 2017 12:25PM EST                       (Author)

## 2018-01-14 VITALS
OXYGEN SATURATION: 96 % | WEIGHT: 136.13 LBS | DIASTOLIC BLOOD PRESSURE: 60 MMHG | BODY MASS INDEX: 28.58 KG/M2 | SYSTOLIC BLOOD PRESSURE: 144 MMHG | HEIGHT: 58 IN | HEART RATE: 75 BPM

## 2018-01-14 VITALS
HEIGHT: 58 IN | HEART RATE: 92 BPM | BODY MASS INDEX: 27.5 KG/M2 | SYSTOLIC BLOOD PRESSURE: 100 MMHG | WEIGHT: 131 LBS | DIASTOLIC BLOOD PRESSURE: 48 MMHG

## 2018-01-14 VITALS
DIASTOLIC BLOOD PRESSURE: 60 MMHG | WEIGHT: 132.5 LBS | HEIGHT: 58 IN | HEART RATE: 80 BPM | SYSTOLIC BLOOD PRESSURE: 136 MMHG | BODY MASS INDEX: 27.81 KG/M2

## 2018-01-14 VITALS
HEART RATE: 80 BPM | BODY MASS INDEX: 25.4 KG/M2 | DIASTOLIC BLOOD PRESSURE: 72 MMHG | WEIGHT: 126 LBS | SYSTOLIC BLOOD PRESSURE: 126 MMHG | HEIGHT: 59 IN

## 2018-01-15 NOTE — RESULT NOTES
Verified Results  (1) CBC/PLT/DIFF [id number] 11:00AM Renetta Narvaez Order Number: [id number]     Test Name Result Flag Reference   WBC COUNT 6 22 Thousand/uL  4 31-10 16   RBC COUNT 4 25 Million/uL  3 81-5 12   HEMOGLOBIN 12 8 g/dL  11 5-15 4   HEMATOCRIT 39 1 %  34 8-46  1   MCV 92 fL  82-98   MCH 30 1 pg  26 8-34 3   MCHC 32 7 g/dL  31 4-37 4   RDW 15 4 % H 11 6-15 1   MPV 10 2 fL  8 9-12 7   PLATELET COUNT 817 Thousands/uL  149-390   nRBC AUTOMATED 0 /100 WBCs     NEUTROPHILS RELATIVE PERCENT 59 %  43-75   LYMPHOCYTES RELATIVE PERCENT 31 %  14-44   MONOCYTES RELATIVE PERCENT 8 %  4-12   EOSINOPHILS RELATIVE PERCENT 1 %  0-6   BASOPHILS RELATIVE PERCENT 1 %  0-1   NEUTROPHILS ABSOLUTE COUNT 3 67 Thousands/? ??L  1 85-7 62   LYMPHOCYTES ABSOLUTE COUNT 1 92 Thousands/? ??L  0 60-4 47   MONOCYTES ABSOLUTE COUNT 0 47 Thousand/? ??L  0 17-1 22   EOSINOPHILS ABSOLUTE COUNT 0 08 Thousand/? ??L  0 00-0 61   BASOPHILS ABSOLUTE COUNT 0 04 Thousands/? ??L  0 00-0 10     (1) COMPREHENSIVE METABOLIC PANEL [id number] 34:20YD Renetta Narvaez Order Number: [id number]     Test Name Result Flag Reference   SODIUM 135 mmol/L L 136-145   POTASSIUM 4 2 mmol/L  3 5-5 3   CHLORIDE 101 mmol/L  100-108   CARBON DIOXIDE 26 mmol/L  21-32   ANION GAP (CALC) 8 mmol/L  4-13   BLOOD UREA NITROGEN 12 mg/dL  5-25   CREATININE 0 70 mg/dL  0 60-1 30   Standardized to IDMS reference method   CALCIUM 9 2 mg/dL  8 3-10 1   BILI, TOTAL 0 82 mg/dL  0 20-1 00   ALK PHOSPHATAS 57 U/L     ALT (SGPT) 15 U/L  12-78   Specimen collection should occur prior to Sulfasalazine and/or Sulfapyridine administration due to the potential for falsely depressed results  AST(SGOT) 14 U/L  5-45   Specimen collection should occur prior to Sulfasalazine administration due to the potential for falsely depressed results     ALBUMIN 3 3 g/dL L 3 5-5 0   TOTAL PROTEIN 7 0 g/dL  6 4-8 2   eGFR 83 ml/min/1 73sq m     National Kidney Disease Education Program recommendations are as follows:  GFR calculation is accurate only with a steady state creatinine  Chronic Kidney disease less than 60 ml/min/1 73 sq  meters  Kidney failure less than 15 ml/min/1 73 sq  meters  GLUCOSE FASTING 184 mg/dL H 65-99   Specimen collection should occur prior to Sulfasalazine administration due to the potential for falsely depressed results  Specimen collection should occur prior to Sulfapyridine administration due to the potential for falsely elevated results  (1) HEMOGLOBIN A1C [id number] 11:00AM [employer] Order Number: [id number]     Test Name Result Flag Reference   HEMOGLOBIN A1C 6 7 % H 4 2-6 3   EST  AVG  GLUCOSE 146 mg/dl       (1) LIPID PANEL, FASTING [id number] 11:00AM [employer] Order Number: [id number]     Test Name Result Flag Reference   CHOLESTEROL 157 mg/dL     HDL,DIRECT 74 mg/dL H 40-60   Specimen collection should occur prior to Metamizole administration due to the potential for falsley depressed results  LDL CHOLESTEROL CALCULATED 65 mg/dL  0-100   Triglyceride:        Normal <150 mg/dl   Borderline High 150-199 mg/dl   High 200-499 mg/dl   Very High >499 mg/dl      Cholesterol:       Desirable <200 mg/dl    Borderline High 200-239 mg/dl    High >239 mg/dl      HDL Cholesterol:       High>59 mg/dL    Low <41 mg/dL      This screening LDL is a calculated result  It does not have the accuracy of the Direct Measured LDL in the monitoring of patients with hyperlipidemia and/or statin therapy  Direct Measure LDL (AWD270) must be ordered separately in these patients  TRIGLYCERIDES 89 mg/dL  <=150   Specimen collection should occur prior to N-Acetylcysteine or Metamizole administration due to the potential for falsely depressed results

## 2018-01-17 NOTE — RESULT NOTES
Verified Results  (1) HEMOGLOBIN A1C [id number] 12:06PM [employer] Order Number: [id number]     Test Name Result Flag Reference   HEMOGLOBIN A1C 7 4 % H 4 2-6 3   EST  AVG  GLUCOSE 166 mg/dl       (1) COMPREHENSIVE METABOLIC PANEL [id number] 18:19LK [employer] Order Number: [id number]     Test Name Result Flag Reference   GLUCOSE,RANDM 186 mg/dL H    If the patient is fasting, the ADA then defines impaired fasting glucose as > 100 mg/dL and diabetes as > or equal to 123 mg/dL  SODIUM 139 mmol/L  136-145   POTASSIUM 3 8 mmol/L  3 5-5 3   CHLORIDE 103 mmol/L  100-108   CARBON DIOXIDE 26 mmol/L  21-32   ANION GAP (CALC) 10 mmol/L  4-13   BLOOD UREA NITROGEN 8 mg/dL  5-25   CREATININE 0 57 mg/dL L 0 60-1 30   Standardized to IDMS reference method   CALCIUM 8 5 mg/dL  8 3-10 1   BILI, TOTAL 0 70 mg/dL  0 20-1 00   ALK PHOSPHATAS 53 U/L     ALT (SGPT) 18 U/L  12-78   AST(SGOT) 8 U/L  5-45   ALBUMIN 3 2 g/dL L 3 5-5 0   TOTAL PROTEIN 6 7 g/dL  6 4-8 2   eGFR Non-African American      >60 0 ml/min/1 73sq m   - Patient Instructions: This is a fasting blood test  Water,black tea or black  coffee only after 9:00pm the night before test Drink 2 glasses of water the morning of test   National Kidney Disease Education Program recommendations are as follows:  GFR calculation is accurate only with a steady state creatinine  Chronic Kidney disease less than 60 ml/min/1 73 sq  meters  Kidney failure less than 15 ml/min/1 73 sq  meters  (1) LIPID PANEL, FASTING [id number] 12:06PM [employer] Order Number: [id number]     Test Name Result Flag Reference   CHOLESTEROL 159 mg/dL     HDL,DIRECT 79 mg/dL H 40-60   Specimen collection should occur prior to Metamizole administration due to the potential for falsely depressed results  LDL CHOLESTEROL CALCULATED 72 mg/dL  0-100   - Patient Instructions:  This is a fasting blood test  Water,black tea or black  coffee only after 9:00pm the night before test   Drink 2 glasses of water the morning of test     - Patient Instructions: This is a fasting blood test  Water,black tea or black  coffee only after 9:00pm the night before test Drink 2 glasses of water the morning of test   Triglyceride:         Normal              <150 mg/dl       Borderline High    150-199 mg/dl       High               200-499 mg/dl       Very High          >499 mg/dl  Cholesterol:         Desirable        <200 mg/dl      Borderline High  200-239 mg/dl      High             >239 mg/dl  HDL Cholesterol:        High    >59 mg/dL      Low     <41 mg/dL  LDL CALCULATED:    This screening LDL is a calculated result  It does not have the accuracy of the Direct Measured LDL in the monitoring of patients with hyperlipidemia and/or statin therapy  Direct Measure LDL (MFR087) must be ordered separately in these patients  TRIGLYCERIDES 42 mg/dL  <=150   Specimen collection should occur prior to N-Acetylcysteine or Metamizole administration due to the potential for falsely depressed results

## 2018-01-17 NOTE — PROGRESS NOTES
Assessment    1  Cerebral infarction (434 91) (I63 9)    Plan  Cerebral infarction    · Call 911 if: You have any symptoms of a stroke ; Status:Complete;   Done: 75QLN7721   Ordered; For:Cerebral infarction; Ordered By:Tim Julio;   · Follow-up visit in 4 Months Evaluation and Treatment  Follow-up  Status: Complete   Done: 87UAZ0401   Ordered; For: Cerebral infarction; Ordered By: Oleh Claude Performed:  Due: 63GBY3185; Last Updated By: Radha Chen; 9/13/2016 3:13:32 PM    Discussion/Summary  Discussion Summary:   Patient was given stroke education and was advised to keep blood pressure cholesterol and sugar under control, also was advised to discuss with her vascular surgeon family physician whether she needs to be on both aspirin and Plavix or just Plavix alone, she was advised to go to the hospital if has any recurrence of the strokelike symptoms and call us follow up with her other physicians, to take fall and safety precautions and continue with home physical therapy and see me back in 4 months ,    Medication Side Effects Reviewed: Possible side effects of new medications were reviewed with the patient/guardian today  Patient Guardian understands agrees: The treatment plan was reviewed with the patient/guardian  The patient/guardian understands and agrees with the treatment plan   Counseling Documentation With Imm: The patient was counseled regarding diagnostic results, risk factor reductions, prognosis, patient and family education, risks and benefits of treatment options, importance of compliance with treatment  Chief Complaint  Chief Complaint Free Text Note Form: The patient is a 67year old right handed lady here today following up on a history of stroke  Since last seen she completed PT which she states has improved her coordination        History of Present Illness  HPI: Patient is here in follow-up status post left frontal stroke and after left carotid endarterectomy, since her last visit she is doing good, she denies any headaches, no motor or sensory symptoms in upper or lower extremities, no speech difficulties, no gait difficulties, no dizziness, she is currently on a combination of aspirin and Plavix for stroke prophylaxis  Review of Systems  Neurological ROS:   Constitutional: fatigue and appetite changes  HEENT: hearing loss, but no eye pain and no dysphagia  Cardiovascular: no chest pain or pressure, no palpitations present and no swelling in the arms or legs  Respiratory: shortness of breath with or without exertion  Gastrointestinal: nausea and daily nausea just before evening meal    Genitourinary:  no incontinence, no feelings of urinary urgency, no increase in frequency, no urinary hesitancy, no dysuria, no hematuria  Integumentary  no masses, no rash, no skin lesions, no livedo reticularis  Psychiatric: no mood swings  Endocrine excessive thirst    Hematologic/Lymphatic: a tendency for easy bruising, but no unusual bleeding  Neurological General: no headache, no convulsions, no syncope, no trouble falling asleep and no awakening at night  Neurological Mental Status: no memory problems  Neurological Cranial Nerves: vertigo or dizziness, but no taste or smell loss/changes and no slurred speech  Neurological Motor findings include: cramping(pre/post exercise) and cramping of her stomach  Neurological Coordination: balance difficulties  Neurological Sensory: no numbness and no tingling  Neurological Gait: difficulty walking, but has not had falls  Active Problems    1  Allergic rhinitis (477 9) (J30 9)   2  Carotid stenosis, bilateral (433 10,433 30) (I65 23)   3  Cerebral infarction (434 91) (I63 9)   4  Chronic pain (338 29) (G89 29)   5  Depression (311) (F32 9)   6  Diabetes mellitus type 2, uncontrolled (250 02) (E11 65)   7  Dry mouth (527 7) (R68 2)   8  Encounter for long-term (current) drug use (V58 69) (Z98 899)   9   Epilepsy (787 90) (G40 909)   10  Gait disturbance (781 2) (R26 9)   11  Hyperlipidemia (272 4) (E78 5)   12  Hypertension (401 9) (I10)   13  Leg pain (729 5) (M79 606)   14  Need for pneumococcal vaccination (V03 82) (Z23)   15  Need for Tdap vaccination (V06 1) (Z23)   16  Peripheral neuropathy (356 9) (G62 9)   17  Screening for genitourinary condition (V81 6) (Z13 89)   18  Screening for skin condition (V82 0) (Z13 89)   19  Seborrheic keratosis (702 19) (L82 1)   20  Skin lesion (709 9) (L98 9)   21  Trigger little finger of right hand (727 03) (M65 351)   22  Urinary incontinence in female (788 30) (R32)   23  Vertigo (780 4) (R42)   24  Visit for screening mammogram (V76 12) (Z12 31)   25  Vitamin D deficiency (268 9) (E55 9)    Past Medical History    1  History of Diverticulosis of large intestine without hemorrhage (562 10) (K57 30)   2  History of colonic polyps (V12 72) (Z86 010)   3  History of diarrhea (V12 79) (E17 753)    Surgical History    1  History of Breast Surgery Lumpectomy   2  History of Cath Stent Placement   3  History of Coronary Artery Triple Arterial Bypass Graft   4  History of Endarterectomy Carotid Artery   5  History of Tonsillectomy    Family History  Mother    1  Family history of lung cancer (V16 1) (Z80 1)   2  Family history of Headache  Brother    3  Family history of cerebrovascular accident (CVA) (V17 1) (Z82 3)    Social History    · Former smoker (N56 11) (Q17 287)   · Retired   ·     Current Meds   1  AmLODIPine Besylate 5 MG Oral Tablet; TAKE 1 TABLET DAILY AS DIRECTED; Therapy: 37WWT8579 to (Evaluate:18Oct2016)  Requested for: 21Apr2016; Last   Rx:21Apr2016 Ordered   2  Aspirin 81 MG TABS; Therapy: (Recorded:63Qyp5861) to Recorded   3  Cilostazol 100 MG Oral Tablet; TAKE 1 TABLET TWICE DAILY; Therapy: 60URW7890 to (Evaluate:26Ryv3103)  Requested for: 76Dad3460; Last   Rx:71Jri2166; Status: ACTIVE - Transmit to Pharmacy - Awaiting Verification Ordered   4   Clopidogrel Bisulfate 75 MG Oral Tablet; take one tablet by mouth daily; Therapy: 10LVB0022 to (Evaluate:42Dek0874); Last Rx:15Rle6019 Ordered   5  Colon Herbal Cleanser Oral Capsule; TAKE 1 CAPSULE DAILY; Therapy: 02Jun2016 to Recorded   6  CoQ-10 CAPS; Therapy: (Recorded:19Nov2015) to Recorded   7  DULoxetine HCl - 60 MG Oral Capsule Delayed Release Particles; TAKE 1 CAPSULE   TWICE DAILY; Therapy: 24Sdo9765 to (Evaluate:35Lee9399)  Requested for: 95Gpv4678; Last   Rx:07Kqb7401; Status: ACTIVE [facility]   8  Gabapentin 100 MG Oral Capsule; TAKE 1 CAPSULE 3 TIMES DAILY; Therapy: 86Pxm7175 to (Evaluate:24Oct2016)  Requested for: 49Sae6641; Last   Rx:49Qio1147 Ordered   9  GlyBURIDE 5 MG Oral Tablet; TAKE 1 TABLET 3 times daily; Therapy: 44DHY2518 to (Evaluate:67Wij4447)  Requested for: 45Opg5525; Last   Rx:18Lcu4204; Status: ACTIVE - Transmit to [facility] Ordered   10  Hydrocodone-Acetaminophen 5-325 MG Oral Tablet; TAKE 1 TABLET EVERY 6 HOURS    AS NEEDED; Therapy: 15FPG3761 to (Evaluate:90Evw8369); Last Rx:20Jan2015 Ordered   11  Lisinopril 20 MG Oral Tablet; TAKE 1 TABLET DAILY FOR BLOOD PRESSURE; Therapy: 79BAT7149 to (Evaluate:18Oct2016)  Requested for: 21Apr2016; Last    Rx:21Apr2016 Ordered   12  Meclizine HCl - 25 MG Oral Tablet; TAKE 1 TABLET 3 TIMES DAILY; Therapy: 52YVG8289 to (Evaluate:43Ssz5822)  Requested for: 71FZM7343; Last    Rx:20Nov2015 Ordered   13  MetFORMIN HCl - 500 MG Oral Tablet; TAKE 2 TABLETS TWICE DAILY; Therapy: 30WHU6987 to (Hedda Flax)  Requested for: 93OFC5666; Last    Rx:07Jun2016 Ordered   14  Simvastatin 40 MG Oral Tablet; TAKE 1 TABLET DAILY; Therapy: 63XGK4834 to (Evaluate:18Oct2016)  Requested for: 21Apr2016; Last    Rx:21Apr2016 Ordered   15  VESIcare 5 MG Oral Tablet; Take 1 tablet daily;     Therapy: 56AZT4679 to (Lebron Rosado)  Requested for: 30Rec4161; Last    Rx:64Tic7331 Ordered   16  Vitamin B12 TABS; Therapy: (Recorded:48Zbu1483) to Recorded   17  Vitamin D3 2000 UNIT Oral Tablet; Therapy: (Recorded:18Oew7036) to Recorded    Allergies    1  Darvon CAPS   2  Penicillin V Potassium TABS    Vitals  Signs   Recorded: 96ZAN5420 27:53OY   Systolic: 999, LUE, Sitting  Diastolic: 67, LUE, Sitting  Heart Rate: 93  Height: 4 ft 10 in  Weight: 141 lb   BMI Calculated: 29 47  BSA Calculated: 1 57  Recorded: 40FCJ1071 97:77WS   Systolic: 197, RUE, Sitting  Diastolic: 68, RUE, Sitting  Heart Rate: 95  Recorded: 02RPD1736 15:73MJ   Systolic: 028, LUE, Sitting  Diastolic: 71, LUE, Sitting  Heart Rate: 92    Physical Exam    Constitutional   General appearance: No acute distress, well appearing and well nourished  Musculoskeletal   Gait and station: Normal gait, stance and balance  Muscle strength: Normal strength throughout  Neurologic   Orientation to person, place, and time: Normal     Attention span and concentration: Normal thought process and attention span  Language: Names objects, able to repeat phrases and speaks spontaneously      2nd cranial nerve: Normal     3rd, 4th, and 6th cranial nerves: Normal     5th cranial nerve: Normal     7th cranial nerve: Normal     8th cranial nerve: Normal     9th cranial nerve: Normal     11th cranial nerve: Normal     12th cranial nerve: Normal     Reflexes: Normal     Coordination: Normal        Future Appointments    Date/Time Provider Specialty Site   11/23/2016 01:20 PM Jaqueline Pineda MD Internal Medicine [facility] INTERNAL MED   01/17/2017 01:35 PM Clinton Peterson MD Neurology [facility] [address]   Electronically signed by : Tia Sultana MD; Sep 13 2016  3:23PM EST                       (Author)

## 2018-02-06 ENCOUNTER — OFFICE VISIT (OUTPATIENT)
Dept: INTERNAL MEDICINE CLINIC | Facility: CLINIC | Age: 78
End: 2018-02-06
Payer: MEDICARE

## 2018-02-06 VITALS
HEIGHT: 59 IN | OXYGEN SATURATION: 98 % | WEIGHT: 121 LBS | HEART RATE: 58 BPM | TEMPERATURE: 98.6 F | SYSTOLIC BLOOD PRESSURE: 148 MMHG | DIASTOLIC BLOOD PRESSURE: 48 MMHG | BODY MASS INDEX: 24.39 KG/M2

## 2018-02-06 DIAGNOSIS — J18.9 PNEUMONIA OF LEFT LUNG DUE TO INFECTIOUS ORGANISM, UNSPECIFIED PART OF LUNG: Primary | ICD-10-CM

## 2018-02-06 PROBLEM — K55.1 MESENTERIC ARTERY STENOSIS (HCC): Status: ACTIVE | Noted: 2017-11-07

## 2018-02-06 PROBLEM — G56.03 CARPAL TUNNEL SYNDROME ON BOTH SIDES: Status: ACTIVE | Noted: 2017-02-17

## 2018-02-06 PROBLEM — I70.213 ATHEROSCLEROSIS OF NATIVE ARTERY OF BOTH LOWER EXTREMITIES WITH INTERMITTENT CLAUDICATION (HCC): Status: ACTIVE | Noted: 2017-11-07

## 2018-02-06 PROBLEM — E11.42 DIABETIC PERIPHERAL NEUROPATHY (HCC): Status: ACTIVE | Noted: 2017-05-16

## 2018-02-06 PROCEDURE — 99214 OFFICE O/P EST MOD 30 MIN: CPT | Performed by: INTERNAL MEDICINE

## 2018-02-06 RX ORDER — SOLIFENACIN SUCCINATE 5 MG/1
1 TABLET, FILM COATED ORAL DAILY
COMMUNITY
Start: 2015-10-14 | End: 2019-01-03 | Stop reason: SDUPTHER

## 2018-02-06 RX ORDER — AMLODIPINE BESYLATE 5 MG/1
1 TABLET ORAL DAILY
COMMUNITY
Start: 2014-01-31 | End: 2018-05-14 | Stop reason: SDUPTHER

## 2018-02-06 RX ORDER — GABAPENTIN 100 MG/1
1 CAPSULE ORAL 3 TIMES DAILY
COMMUNITY
Start: 2016-08-25 | End: 2018-06-12 | Stop reason: SDUPTHER

## 2018-02-06 RX ORDER — NITROGLYCERIN 0.4 MG/1
1 TABLET SUBLINGUAL ONCE AS NEEDED
COMMUNITY

## 2018-02-06 RX ORDER — CLOPIDOGREL BISULFATE 75 MG/1
1 TABLET ORAL DAILY
COMMUNITY
Start: 2016-06-02 | End: 2018-05-14 | Stop reason: SDUPTHER

## 2018-02-06 RX ORDER — GLIMEPIRIDE 4 MG/1
1 TABLET ORAL 2 TIMES DAILY
COMMUNITY
Start: 2016-12-27 | End: 2018-08-29 | Stop reason: SDUPTHER

## 2018-02-06 RX ORDER — LISINOPRIL 20 MG/1
TABLET ORAL
COMMUNITY
End: 2018-05-14 | Stop reason: SDUPTHER

## 2018-02-06 RX ORDER — DULOXETIN HYDROCHLORIDE 60 MG/1
1 CAPSULE, DELAYED RELEASE ORAL 2 TIMES DAILY
COMMUNITY
Start: 2014-02-20 | End: 2018-04-05 | Stop reason: SDUPTHER

## 2018-02-06 RX ORDER — SIMVASTATIN 40 MG
1 TABLET ORAL DAILY
COMMUNITY
Start: 2015-09-22 | End: 2018-05-14 | Stop reason: SDUPTHER

## 2018-02-06 NOTE — PROGRESS NOTES
Assessment/Plan:    Pneumonia appears resolved  She may start tapering off of the Lasix as well  Continue follow-up with Pulmonary as previously scheduled  No problem-specific Assessment & Plan notes found for this encounter  Diagnoses and all orders for this visit:    Pneumonia of left lung due to infectious organism, unspecified part of lung    Other orders  -     amLODIPine (NORVASC) 5 mg tablet; Take 1 tablet by mouth daily  -     clopidogrel (PLAVIX) 75 mg tablet; Take 1 tablet by mouth daily  -     DULoxetine (CYMBALTA) 60 mg delayed release capsule; Take 1 capsule by mouth 2 (two) times a day  -     glimepiride (AMARYL) 4 mg tablet; Take 1 tablet by mouth 2 (two) times a day  -     gabapentin (NEURONTIN) 100 mg capsule; Take 1 capsule by mouth 3 (three) times a day  -     lisinopril (ZESTRIL) 20 mg tablet; 1 tab(s)  -     metFORMIN (GLUCOPHAGE) 500 mg tablet; Take 2 tablets by mouth 2 (two) times a day  -     nitroglycerin (NITROSTAT) 0 4 mg SL tablet; 1 tab(s)  -     simvastatin (ZOCOR) 40 mg tablet; Take 1 tablet by mouth daily  -     solifenacin (VESICARE) 5 mg tablet; Take 1 tablet by mouth daily          Subjective:      Patient ID: Zander Mcgowan is a 65 y o  female  Patient comes in today for hospital follow-up  She was admitted to [address] [place] [address] with pneumonia  Treated with IV antibiotics and slowly but surely got better  No complications were noted except for some mild heart failure probably secondary to the pneumonia  She was sent home on p o  antibiotics and a low-dose of Lasix 20 milligrams  She is with her significant other  They both feel she is doing better, but slowly  Today was her 1st day out of the house  She has visiting nurses as well as physical therapy coming to the house  The following portions of the patient's history were reviewed and updated as appropriate: allergies and current medications  Review of Systems   Respiratory: Negative for shortness of breath  Cardiovascular: Negative for chest pain  Gastrointestinal: Negative for abdominal pain  Objective:     Physical Exam   Constitutional: She is oriented to person, place, and time  She appears well-developed and well-nourished  Cardiovascular: Normal rate, regular rhythm and normal heart sounds  Pulmonary/Chest: Effort normal and breath sounds normal    Abdominal: Soft  There is no tenderness  Neurological: She is alert and oriented to person, place, and time  Nursing note and vitals reviewed

## 2018-02-22 DIAGNOSIS — IMO0001 UNCONTROLLED TYPE 2 DIABETES MELLITUS WITHOUT COMPLICATION, WITHOUT LONG-TERM CURRENT USE OF INSULIN: Primary | ICD-10-CM

## 2018-02-27 ENCOUNTER — TELEPHONE (OUTPATIENT)
Dept: INTERNAL MEDICINE CLINIC | Facility: CLINIC | Age: 78
End: 2018-02-27

## 2018-02-27 DIAGNOSIS — I10 ESSENTIAL HYPERTENSION: Primary | ICD-10-CM

## 2018-02-27 RX ORDER — FUROSEMIDE 20 MG/1
20 TABLET ORAL DAILY
COMMUNITY
Start: 2018-01-25 | End: 2018-02-27 | Stop reason: SDUPTHER

## 2018-02-27 RX ORDER — FUROSEMIDE 20 MG/1
20 TABLET ORAL DAILY
Qty: 30 TABLET | Refills: 5 | Status: SHIPPED | OUTPATIENT
Start: 2018-02-27 | End: 2018-06-12 | Stop reason: SDUPTHER

## 2018-02-27 NOTE — TELEPHONE ENCOUNTER
[facility] called and stated that patient needs test strips, lancets, and 20 mg Lasix  I don't see these in her chart? Please advise

## 2018-03-06 ENCOUNTER — OFFICE VISIT (OUTPATIENT)
Dept: INTERNAL MEDICINE CLINIC | Facility: CLINIC | Age: 78
End: 2018-03-06
Payer: MEDICARE

## 2018-03-06 VITALS
BODY MASS INDEX: 24.16 KG/M2 | HEART RATE: 68 BPM | WEIGHT: 119.6 LBS | DIASTOLIC BLOOD PRESSURE: 52 MMHG | SYSTOLIC BLOOD PRESSURE: 142 MMHG

## 2018-03-06 DIAGNOSIS — J30.9 CHRONIC ALLERGIC RHINITIS, UNSPECIFIED SEASONALITY, UNSPECIFIED TRIGGER: ICD-10-CM

## 2018-03-06 DIAGNOSIS — G62.9 PERIPHERAL POLYNEUROPATHY: Primary | ICD-10-CM

## 2018-03-06 DIAGNOSIS — IMO0001 UNCONTROLLED TYPE 2 DIABETES MELLITUS WITHOUT COMPLICATION, WITHOUT LONG-TERM CURRENT USE OF INSULIN: ICD-10-CM

## 2018-03-06 DIAGNOSIS — J18.9 PNEUMONIA OF LEFT LUNG DUE TO INFECTIOUS ORGANISM, UNSPECIFIED PART OF LUNG: ICD-10-CM

## 2018-03-06 PROCEDURE — 99214 OFFICE O/P EST MOD 30 MIN: CPT | Performed by: INTERNAL MEDICINE

## 2018-03-06 NOTE — PATIENT INSTRUCTIONS
Can try taking the gabapentin 2-3 times a day but watch for sedation  Add Claritin or Allegra in case allergies are causing the cough  Followup with Dr Joselin Winkler

## 2018-03-06 NOTE — PROGRESS NOTES
Assessment/Plan:     Follow-up with Pulmonary on Thursday as planned  Patient Instructions   Can try taking the gabapentin 2-3 times a day but watch for sedation  Add Claritin or Allegra in case allergies are causing the cough  Followup with Dr Gerry Arboleda  Followup scheduled per orders  No problem-specific Assessment & Plan notes found for this encounter  Diagnoses and all orders for this visit:    Peripheral polyneuropathy    Pneumonia of left lung due to infectious organism, unspecified part of lung    Chronic allergic rhinitis, unspecified seasonality, unspecified trigger    Uncontrolled type 2 diabetes mellitus without complication, without long-term current use of insulin ([facility])  -     CBC and differential; Future  -     Comprehensive metabolic panel; Future  -     HEMOGLOBIN A1C W/ EAG ESTIMATION; Future  -     Lipid panel; Future  -     Microalbumin / creatinine urine ratio          Subjective:      Patient ID: Vanessa Chan is a 65 y o  female  Here for one-month follow-up after discharge from the hospital   Her pneumonia has essentially resolved but she reports a lingering cough  She does have follow-up with the pulmonary doctor on Thursday  She is using her inhalers  But she has also started to notice she is sneezing more  She does have seasonal allergies and has not been taking any Claritin or Allegra  Sugars have been doing okay  Biggest complaint remains her neuropathy symptoms  Her feet are getting worse  She is only taking the gabapentin once a day  3 times a day was causing too much sedation  But she has not tried just twice a day  ALLERGIES:  Allergies   Allergen Reactions    Calcipotriene Hives    Penicillin V Hives     Category:  Allergy;     Propoxyphene Rash       CURRENT MEDICATIONS:    Current Outpatient Prescriptions:     amLODIPine (NORVASC) 5 mg tablet, Take 1 tablet by mouth daily, Disp: , Rfl:     clopidogrel (PLAVIX) 75 mg tablet, Take 1 tablet by mouth daily, Disp: , Rfl:     DULoxetine (CYMBALTA) 60 mg delayed release capsule, Take 1 capsule by mouth 2 (two) times a day, Disp: , Rfl:     furosemide (LASIX) 20 mg tablet, Take 1 tablet (20 mg total) by mouth daily, Disp: 30 tablet, Rfl: 5    gabapentin (NEURONTIN) 100 mg capsule, Take 1 capsule by mouth 3 (three) times a day, Disp: , Rfl:     glimepiride (AMARYL) 4 mg tablet, Take 1 tablet by mouth 2 (two) times a day, Disp: , Rfl:     lisinopril (ZESTRIL) 20 mg tablet, 1 tab(s), Disp: , Rfl:     metFORMIN (GLUCOPHAGE) 500 mg tablet, take 2 tablets by mouth twice a day, Disp: 120 tablet, Rfl: 5    nitroglycerin (NITROSTAT) 0 4 mg SL tablet, 1 tab(s), Disp: , Rfl:     simvastatin (ZOCOR) 40 mg tablet, Take 1 tablet by mouth daily, Disp: , Rfl:     solifenacin (VESICARE) 5 mg tablet, Take 1 tablet by mouth daily, Disp: , Rfl:     ACTIVE PROBLEM LIST:  Patient Active Problem List   Diagnosis    Allergic rhinitis    Atherosclerosis of native artery of both lower extremities with intermittent claudication (HCC)    Carotid stenosis, bilateral    Carpal tunnel syndrome on both sides    Depression    Diabetes mellitus type 2, uncontrolled ([address]    Diabetic peripheral neuropathy ([address] )    Hyperlipidemia    Hypertension    Mesenteric artery stenosis (HCC)    Seborrheic keratosis    Skin lesion    Vitamin D deficiency    Cerebral infarction ([address] )    Peripheral neuropathy    Trigger little finger of right hand    Urinary incontinence in female    Vertigo       PAST MEDICAL HISTORY:  Past Medical History:   Diagnosis Date    Colonic polyp     Diverticulosis     large intestine without hemmorhage       PAST SURGICAL HISTORY:  Past Surgical History:   Procedure Laterality Date    BREAST SURGERY      CAROTID ENDARTERECTOMY      CORONARY ANGIOPLASTY WITH STENT PLACEMENT      leg and chest    CORONARY ANGIOPLASTY WITH STENT PLACEMENT      adominal stent    CORONARY ARTERY BYPASS GRAFT triple artery    TONSILLECTOMY         FAMILY HISTORY:  Family History   Problem Relation Age of Onset    Lung cancer Mother     Migraines Mother     Stroke Brother        SOCIAL HISTORY:  Social History     Social History    Marital status:      Spouse name: N/A    Number of children: N/A    Years of education: N/A     Occupational History          retired     Social History Main Topics    Smoking status: Former Smoker    Smokeless tobacco: Never Used    Alcohol use Yes    Drug use: No    Sexual activity: No     Other Topics Concern    Not on file     Social History Narrative    No narrative on file       Review of Systems   Respiratory: Negative for shortness of breath  Cardiovascular: Negative for chest pain  Gastrointestinal: Negative for abdominal pain  Objective:  Vitals:    03/06/18 1410   BP: 142/52   BP Location: Left arm   Patient Position: Sitting   Cuff Size: Adult   Pulse: 68   Weight: 54 3 kg (119 lb 9 6 oz)        Physical Exam   Constitutional: She is oriented to person, place, and time  She appears well-developed and well-nourished  Cardiovascular: Normal rate, regular rhythm and normal heart sounds  Pulmonary/Chest: Effort normal and breath sounds normal    Abdominal: Soft  There is no tenderness  Neurological: She is alert and oriented to person, place, and time  Nursing note and vitals reviewed  RESULTS:    No results found for this or any previous visit (from the past 1008 hour(s))

## 2018-04-05 DIAGNOSIS — F32.A ANXIETY AND DEPRESSION: Primary | ICD-10-CM

## 2018-04-05 DIAGNOSIS — F41.9 ANXIETY AND DEPRESSION: Primary | ICD-10-CM

## 2018-04-05 RX ORDER — DULOXETIN HYDROCHLORIDE 60 MG/1
CAPSULE, DELAYED RELEASE ORAL
Qty: 60 CAPSULE | Refills: 5 | Status: SHIPPED | OUTPATIENT
Start: 2018-04-05 | End: 2018-07-11 | Stop reason: SDUPTHER

## 2018-04-13 DIAGNOSIS — IMO0001 UNCONTROLLED TYPE 2 DIABETES MELLITUS WITHOUT COMPLICATION, WITHOUT LONG-TERM CURRENT USE OF INSULIN: ICD-10-CM

## 2018-05-14 DIAGNOSIS — I73.9 PAD (PERIPHERAL ARTERY DISEASE) (HCC): ICD-10-CM

## 2018-05-14 DIAGNOSIS — I10 ESSENTIAL HYPERTENSION: Primary | ICD-10-CM

## 2018-05-14 DIAGNOSIS — E78.2 MIXED HYPERLIPIDEMIA: ICD-10-CM

## 2018-05-14 RX ORDER — LISINOPRIL 20 MG/1
TABLET ORAL
Qty: 30 TABLET | Refills: 5 | Status: SHIPPED | OUTPATIENT
Start: 2018-05-14 | End: 2018-06-12

## 2018-05-14 RX ORDER — CLOPIDOGREL BISULFATE 75 MG/1
TABLET ORAL
Qty: 30 TABLET | Refills: 5 | Status: SHIPPED | OUTPATIENT
Start: 2018-05-14 | End: 2018-12-19 | Stop reason: SDUPTHER

## 2018-05-14 RX ORDER — SIMVASTATIN 40 MG
TABLET ORAL
Qty: 30 TABLET | Refills: 5 | Status: SHIPPED | OUTPATIENT
Start: 2018-05-14 | End: 2018-12-19 | Stop reason: SDUPTHER

## 2018-05-14 RX ORDER — AMLODIPINE BESYLATE 5 MG/1
TABLET ORAL
Qty: 30 TABLET | Refills: 5 | Status: SHIPPED | OUTPATIENT
Start: 2018-05-14 | End: 2018-12-19 | Stop reason: SDUPTHER

## 2018-06-06 ENCOUNTER — APPOINTMENT (OUTPATIENT)
Dept: LAB | Facility: CLINIC | Age: 78
End: 2018-06-06
Payer: MEDICARE

## 2018-06-06 DIAGNOSIS — E78.5 HYPERLIPIDEMIA: ICD-10-CM

## 2018-06-06 DIAGNOSIS — IMO0001 UNCONTROLLED TYPE 2 DIABETES MELLITUS WITHOUT COMPLICATION, WITHOUT LONG-TERM CURRENT USE OF INSULIN: ICD-10-CM

## 2018-06-06 LAB
ALBUMIN SERPL BCP-MCNC: 3.3 G/DL (ref 3.5–5)
ALP SERPL-CCNC: 56 U/L (ref 46–116)
ALT SERPL W P-5'-P-CCNC: 17 U/L (ref 12–78)
ANION GAP SERPL CALCULATED.3IONS-SCNC: 7 MMOL/L (ref 4–13)
AST SERPL W P-5'-P-CCNC: 11 U/L (ref 5–45)
BASOPHILS # BLD AUTO: 0.04 THOUSANDS/ΜL (ref 0–0.1)
BASOPHILS NFR BLD AUTO: 1 % (ref 0–1)
BILIRUB SERPL-MCNC: 0.89 MG/DL (ref 0.2–1)
BUN SERPL-MCNC: 15 MG/DL (ref 5–25)
CALCIUM SERPL-MCNC: 8.8 MG/DL (ref 8.3–10.1)
CHLORIDE SERPL-SCNC: 101 MMOL/L (ref 100–108)
CHOLEST SERPL-MCNC: 144 MG/DL (ref 50–200)
CO2 SERPL-SCNC: 27 MMOL/L (ref 21–32)
CREAT SERPL-MCNC: 0.68 MG/DL (ref 0.6–1.3)
CREAT UR-MCNC: 83.5 MG/DL
EOSINOPHIL # BLD AUTO: 0.08 THOUSAND/ΜL (ref 0–0.61)
EOSINOPHIL NFR BLD AUTO: 2 % (ref 0–6)
ERYTHROCYTE [DISTWIDTH] IN BLOOD BY AUTOMATED COUNT: 14.7 % (ref 11.6–15.1)
EST. AVERAGE GLUCOSE BLD GHB EST-MCNC: 160 MG/DL
GFR SERPL CREATININE-BSD FRML MDRD: 83 ML/MIN/1.73SQ M
GLUCOSE P FAST SERPL-MCNC: 149 MG/DL (ref 65–99)
HBA1C MFR BLD: 7.2 % (ref 4.2–6.3)
HCT VFR BLD AUTO: 36.3 % (ref 34.8–46.1)
HDLC SERPL-MCNC: 67 MG/DL (ref 40–60)
HGB BLD-MCNC: 11.5 G/DL (ref 11.5–15.4)
IMM GRANULOCYTES # BLD AUTO: 0.03 THOUSAND/UL (ref 0–0.2)
IMM GRANULOCYTES NFR BLD AUTO: 1 % (ref 0–2)
LDLC SERPL CALC-MCNC: 64 MG/DL (ref 0–100)
LYMPHOCYTES # BLD AUTO: 1.38 THOUSANDS/ΜL (ref 0.6–4.47)
LYMPHOCYTES NFR BLD AUTO: 29 % (ref 14–44)
MCH RBC QN AUTO: 29.6 PG (ref 26.8–34.3)
MCHC RBC AUTO-ENTMCNC: 31.7 G/DL (ref 31.4–37.4)
MCV RBC AUTO: 93 FL (ref 82–98)
MICROALBUMIN UR-MCNC: 1360 MG/L (ref 0–20)
MICROALBUMIN/CREAT 24H UR: 1629 MG/G CREATININE (ref 0–30)
MONOCYTES # BLD AUTO: 0.45 THOUSAND/ΜL (ref 0.17–1.22)
MONOCYTES NFR BLD AUTO: 10 % (ref 4–12)
NEUTROPHILS # BLD AUTO: 2.77 THOUSANDS/ΜL (ref 1.85–7.62)
NEUTS SEG NFR BLD AUTO: 57 % (ref 43–75)
NONHDLC SERPL-MCNC: 77 MG/DL
NRBC BLD AUTO-RTO: 0 /100 WBCS
PLATELET # BLD AUTO: 221 THOUSANDS/UL (ref 149–390)
PMV BLD AUTO: 9.8 FL (ref 8.9–12.7)
POTASSIUM SERPL-SCNC: 4.8 MMOL/L (ref 3.5–5.3)
PROT SERPL-MCNC: 6.7 G/DL (ref 6.4–8.2)
RBC # BLD AUTO: 3.89 MILLION/UL (ref 3.81–5.12)
SODIUM SERPL-SCNC: 135 MMOL/L (ref 136–145)
TRIGL SERPL-MCNC: 63 MG/DL
WBC # BLD AUTO: 4.75 THOUSAND/UL (ref 4.31–10.16)

## 2018-06-06 PROCEDURE — 82570 ASSAY OF URINE CREATININE: CPT | Performed by: INTERNAL MEDICINE

## 2018-06-06 PROCEDURE — 36415 COLL VENOUS BLD VENIPUNCTURE: CPT

## 2018-06-06 PROCEDURE — 80053 COMPREHEN METABOLIC PANEL: CPT

## 2018-06-06 PROCEDURE — 83036 HEMOGLOBIN GLYCOSYLATED A1C: CPT

## 2018-06-06 PROCEDURE — 82043 UR ALBUMIN QUANTITATIVE: CPT | Performed by: INTERNAL MEDICINE

## 2018-06-06 PROCEDURE — 85025 COMPLETE CBC W/AUTO DIFF WBC: CPT

## 2018-06-06 PROCEDURE — 80061 LIPID PANEL: CPT

## 2018-06-12 ENCOUNTER — OFFICE VISIT (OUTPATIENT)
Dept: INTERNAL MEDICINE CLINIC | Facility: CLINIC | Age: 78
End: 2018-06-12
Payer: MEDICARE

## 2018-06-12 VITALS
HEIGHT: 59 IN | WEIGHT: 126.6 LBS | RESPIRATION RATE: 20 BRPM | HEART RATE: 68 BPM | DIASTOLIC BLOOD PRESSURE: 58 MMHG | OXYGEN SATURATION: 96 % | BODY MASS INDEX: 25.52 KG/M2 | SYSTOLIC BLOOD PRESSURE: 152 MMHG

## 2018-06-12 DIAGNOSIS — E11.42 DIABETIC PERIPHERAL NEUROPATHY (HCC): ICD-10-CM

## 2018-06-12 DIAGNOSIS — I10 ESSENTIAL HYPERTENSION: ICD-10-CM

## 2018-06-12 DIAGNOSIS — IMO0001 UNCONTROLLED TYPE 2 DIABETES MELLITUS WITHOUT COMPLICATION, WITHOUT LONG-TERM CURRENT USE OF INSULIN: Primary | ICD-10-CM

## 2018-06-12 PROCEDURE — 99214 OFFICE O/P EST MOD 30 MIN: CPT | Performed by: INTERNAL MEDICINE

## 2018-06-12 RX ORDER — LISINOPRIL 40 MG/1
40 TABLET ORAL DAILY
COMMUNITY
Start: 2018-03-15 | End: 2018-06-20

## 2018-06-12 RX ORDER — GLIMEPIRIDE 4 MG/1
TABLET ORAL
Qty: 60 TABLET | Refills: 5 | OUTPATIENT
Start: 2018-06-12

## 2018-06-12 RX ORDER — FUROSEMIDE 40 MG/1
40 TABLET ORAL DAILY
Qty: 30 TABLET | Refills: 3 | Status: SHIPPED | OUTPATIENT
Start: 2018-06-12 | End: 2019-04-23 | Stop reason: SDUPTHER

## 2018-06-12 RX ORDER — GABAPENTIN 100 MG/1
100 CAPSULE ORAL 2 TIMES DAILY
Refills: 0
Start: 2018-06-12 | End: 2019-02-22 | Stop reason: SDUPTHER

## 2018-06-12 NOTE — PROGRESS NOTES
Assessment/Plan:      Will try and increase her water pill and reduce some of the fluid  That may help the leg symptoms  Reviewed diet with her  She is going to stop the peanut butter which will probably lower the sugars  That may help the neuropathy as well  Follow-up with vascular  Return in about 1 week (around 6/19/2018)  No problem-specific Assessment & Plan notes found for this encounter  Diagnoses and all orders for this visit:    Uncontrolled type 2 diabetes mellitus without complication, without long-term current use of insulin (HCC)    Diabetic peripheral neuropathy (HCC)  -     gabapentin (NEURONTIN) 100 mg capsule; Take 1 capsule (100 mg total) by mouth 2 (two) times a day    Essential hypertension  -     furosemide (LASIX) 40 mg tablet; Take 1 tablet (40 mg total) by mouth daily  -     Basic metabolic panel; Future    Other orders  -     lisinopril (ZESTRIL) 40 mg tablet; Take 40 mg by mouth daily          Subjective:      Patient ID: Mary Hines is a 77 y o  female  Patient comes in today for routine follow-up  She is having increasing difficulties with her legs  Sugars are up slightly and she is taking her medicine as directed  But she got into the habit of eating an apple sliced up with peanut butter on top  This was the peanut butter with the added sugar  This could be worsening her neuropathy in the legs  Blood pressure is also high today but she is becoming more and more agitated by her leg symptoms  They heard at night  The her during the day  Her balance is off because of them  ALLERGIES:  Allergies   Allergen Reactions    Calcipotriene Hives    Penicillin V Hives     Category:  Allergy;     Propoxyphene Rash       CURRENT MEDICATIONS:    Current Outpatient Prescriptions:     amLODIPine (NORVASC) 5 mg tablet, take 1 tablet by mouth once daily, Disp: 30 tablet, Rfl: 5    clopidogrel (PLAVIX) 75 mg tablet, take 1 tablet by mouth once daily, Disp: 30 tablet, Rfl: 5    DULoxetine (CYMBALTA) 60 mg delayed release capsule, TAKE 1 CAPSULE TWICE DAILY  , Disp: 60 capsule, Rfl: 5    furosemide (LASIX) 40 mg tablet, Take 1 tablet (40 mg total) by mouth daily, Disp: 30 tablet, Rfl: 3    gabapentin (NEURONTIN) 100 mg capsule, Take 1 capsule (100 mg total) by mouth 2 (two) times a day, Disp: , Rfl: 0    glimepiride (AMARYL) 4 mg tablet, Take 1 tablet by mouth 2 (two) times a day, Disp: , Rfl:     metFORMIN (GLUCOPHAGE) 500 mg tablet, Take 2 tablets (1,000 mg total) by mouth 2 (two) times a day, Disp: 120 tablet, Rfl: 5    nitroglycerin (NITROSTAT) 0 4 mg SL tablet, 1 tab(s), Disp: , Rfl:     simvastatin (ZOCOR) 40 mg tablet, take 1 tablet by mouth once daily, Disp: 30 tablet, Rfl: 5    solifenacin (VESICARE) 5 mg tablet, Take 1 tablet by mouth daily, Disp: , Rfl:     lisinopril (ZESTRIL) 40 mg tablet, Take 40 mg by mouth daily, Disp: , Rfl:     ACTIVE PROBLEM LIST:  Patient Active Problem List   Diagnosis    Allergic rhinitis    Atherosclerosis of native artery of both lower extremities with intermittent claudication (HCC)    Carotid stenosis, bilateral    Carpal tunnel syndrome on both sides    Depression    Diabetes mellitus type 2, uncontrolled ([address] )    Diabetic peripheral neuropathy (HCC)    Hyperlipidemia    Hypertension    Mesenteric artery stenosis (HCC)    Seborrheic keratosis    Skin lesion    Vitamin D deficiency    Cerebral infarction (HCC)    Peripheral neuropathy    Trigger little finger of right hand    Urinary incontinence in female    Vertigo       PAST MEDICAL HISTORY:  Past Medical History:   Diagnosis Date    Colonic polyp     Diverticulosis     large intestine without hemmorhage       PAST SURGICAL HISTORY:  Past Surgical History:   Procedure Laterality Date    BREAST SURGERY      CAROTID ENDARTERECTOMY      CORONARY ANGIOPLASTY WITH STENT PLACEMENT      leg and chest    CORONARY ANGIOPLASTY WITH STENT PLACEMENT adominal stent    CORONARY ARTERY BYPASS GRAFT      triple artery    TONSILLECTOMY         FAMILY HISTORY:  Family History   Problem Relation Age of Onset    Lung cancer Mother     Migraines Mother     Stroke Brother        SOCIAL HISTORY:  Social History     Social History    Marital status:      Spouse name: N/A    Number of children: N/A    Years of education: N/A     Occupational History          retired     Social History Main Topics    Smoking status: Former Smoker    Smokeless tobacco: Never Used    Alcohol use Yes    Drug use: No    Sexual activity: No     Other Topics Concern    Not on file     Social History Narrative    No narrative on file       Review of Systems   Respiratory: Negative for shortness of breath  Cardiovascular: Negative for chest pain  Gastrointestinal: Negative for abdominal pain  Objective:  Vitals:    06/12/18 1405   BP: 152/58   BP Location: Left arm   Patient Position: Sitting   Cuff Size: Adult   Pulse: 68   Resp: 20   SpO2: 96%   Weight: 57 4 kg (126 lb 9 6 oz)   Height: 4' 11" (1 499 m)        Physical Exam   Constitutional: She is oriented to person, place, and time  She appears well-developed and well-nourished  Cardiovascular: Normal rate, regular rhythm and normal heart sounds  Pulmonary/Chest: Effort normal and breath sounds normal    Abdominal: Soft  There is no tenderness  Neurological: She is alert and oriented to person, place, and time  Skin:   Numerous spider veins on the lower extremities  (She states these are new)  Nursing note and vitals reviewed  RESULTS:    Recent Results (from the past 1008 hour(s))   Microalbumin / creatinine urine ratio    Collection Time: 06/06/18 11:47 AM   Result Value Ref Range    Creatinine, Ur 83 5 mg/dL    Microalbum  ,U,Random 1,360 0 (H) 0 0 - 20 0 mg/L    Microalb Creat Ratio 1,629 (H) 0 - 30 mg/g creatinine   CBC and differential    Collection Time: 06/06/18 11:47 AM   Result Value Ref Range    WBC 4 75 4 31 - 10 16 Thousand/uL    RBC 3 89 3 81 - 5 12 Million/uL    Hemoglobin 11 5 11 5 - 15 4 g/dL    Hematocrit 36 3 34 8 - 46 1 %    MCV 93 82 - 98 fL    MCH 29 6 26 8 - 34 3 pg    MCHC 31 7 31 4 - 37 4 g/dL    RDW 14 7 11 6 - 15 1 %    MPV 9 8 8 9 - 12 7 fL    Platelets 766 677 - 362 Thousands/uL    nRBC 0 /100 WBCs    Neutrophils Relative 57 43 - 75 %    Immat GRANS % 1 0 - 2 %    Lymphocytes Relative 29 14 - 44 %    Monocytes Relative 10 4 - 12 %    Eosinophils Relative 2 0 - 6 %    Basophils Relative 1 0 - 1 %    Neutrophils Absolute 2 77 1 85 - 7 62 Thousands/µL    Immature Grans Absolute 0 03 0 00 - 0 20 Thousand/uL    Lymphocytes Absolute 1 38 0 60 - 4 47 Thousands/µL    Monocytes Absolute 0 45 0 17 - 1 22 Thousand/µL    Eosinophils Absolute 0 08 0 00 - 0 61 Thousand/µL    Basophils Absolute 0 04 0 00 - 0 10 Thousands/µL   Comprehensive metabolic panel    Collection Time: 06/06/18 11:47 AM   Result Value Ref Range    Sodium 135 (L) 136 - 145 mmol/L    Potassium 4 8 3 5 - 5 3 mmol/L    Chloride 101 100 - 108 mmol/L    CO2 27 21 - 32 mmol/L    Anion Gap 7 4 - 13 mmol/L    BUN 15 5 - 25 mg/dL    Creatinine 0 68 0 60 - 1 30 mg/dL    Glucose, Fasting 149 (H) 65 - 99 mg/dL    Calcium 8 8 8 3 - 10 1 mg/dL    AST 11 5 - 45 U/L    ALT 17 12 - 78 U/L    Alkaline Phosphatase 56 46 - 116 U/L    Total Protein 6 7 6 4 - 8 2 g/dL    Albumin 3 3 (L) 3 5 - 5 0 g/dL    Total Bilirubin 0 89 0 20 - 1 00 mg/dL    eGFR 83 ml/min/1 73sq m   Lipid panel    Collection Time: 06/06/18 11:47 AM   Result Value Ref Range    Cholesterol 144 50 - 200 mg/dL    Triglycerides 63 <=150 mg/dL    HDL, Direct 67 (H) 40 - 60 mg/dL    LDL Calculated 64 0 - 100 mg/dL    Non-HDL-Chol (CHOL-HDL) 77 mg/dl   HEMOGLOBIN A1C W/ EAG ESTIMATION    Collection Time: 06/06/18 11:47 AM   Result Value Ref Range    Hemoglobin A1C 7 2 (H) 4 2 - 6 3 %     mg/dl       This note was created with voice recognition software    Phonic, grammatical and spelling errors may be present within the note as a result

## 2018-06-15 ENCOUNTER — APPOINTMENT (OUTPATIENT)
Dept: LAB | Facility: CLINIC | Age: 78
End: 2018-06-15
Payer: MEDICARE

## 2018-06-15 DIAGNOSIS — I10 ESSENTIAL HYPERTENSION: ICD-10-CM

## 2018-06-15 LAB
ANION GAP SERPL CALCULATED.3IONS-SCNC: 7 MMOL/L (ref 4–13)
BUN SERPL-MCNC: 17 MG/DL (ref 5–25)
CALCIUM SERPL-MCNC: 9 MG/DL (ref 8.3–10.1)
CHLORIDE SERPL-SCNC: 99 MMOL/L (ref 100–108)
CO2 SERPL-SCNC: 28 MMOL/L (ref 21–32)
CREAT SERPL-MCNC: 0.72 MG/DL (ref 0.6–1.3)
GFR SERPL CREATININE-BSD FRML MDRD: 80 ML/MIN/1.73SQ M
GLUCOSE P FAST SERPL-MCNC: 213 MG/DL (ref 65–99)
POTASSIUM SERPL-SCNC: 4.4 MMOL/L (ref 3.5–5.3)
SODIUM SERPL-SCNC: 134 MMOL/L (ref 136–145)

## 2018-06-15 PROCEDURE — 36415 COLL VENOUS BLD VENIPUNCTURE: CPT

## 2018-06-15 PROCEDURE — 80048 BASIC METABOLIC PNL TOTAL CA: CPT

## 2018-06-20 ENCOUNTER — OFFICE VISIT (OUTPATIENT)
Dept: INTERNAL MEDICINE CLINIC | Facility: CLINIC | Age: 78
End: 2018-06-20
Payer: MEDICARE

## 2018-06-20 VITALS
HEART RATE: 73 BPM | HEIGHT: 59 IN | OXYGEN SATURATION: 94 % | SYSTOLIC BLOOD PRESSURE: 138 MMHG | DIASTOLIC BLOOD PRESSURE: 62 MMHG | WEIGHT: 124.6 LBS | BODY MASS INDEX: 25.12 KG/M2

## 2018-06-20 DIAGNOSIS — I10 ESSENTIAL HYPERTENSION: ICD-10-CM

## 2018-06-20 DIAGNOSIS — E11.42 DIABETIC PERIPHERAL NEUROPATHY (HCC): Primary | ICD-10-CM

## 2018-06-20 PROCEDURE — 99213 OFFICE O/P EST LOW 20 MIN: CPT | Performed by: INTERNAL MEDICINE

## 2018-06-20 RX ORDER — LISINOPRIL 20 MG/1
20 TABLET ORAL DAILY
COMMUNITY
Start: 2018-06-14 | End: 2018-12-17 | Stop reason: SDUPTHER

## 2018-06-20 NOTE — PROGRESS NOTES
Assessment/Plan:      Appears to be doing better  Would continue current medications  Continue watching the diet  Return in about 3 weeks (around 7/11/2018)  No problem-specific Assessment & Plan notes found for this encounter  There are no diagnoses linked to this encounter  Subjective:      Patient ID: Miguel Ludwig is a 77 y o  female  Patient comes in today for follow-up  She is happy work with the result with the water pill  The swelling has gone down  Her pressure is doing better  The neuropathy symptoms in her legs are doing better  She also has been watching her diet for the sugar better and the sugars are doing well  Labs looked okay  ALLERGIES:  Allergies   Allergen Reactions    Calcipotriene Hives    Penicillin V Hives     Category: Allergy;     Propoxyphene Rash       CURRENT MEDICATIONS:    Current Outpatient Prescriptions:     amLODIPine (NORVASC) 5 mg tablet, take 1 tablet by mouth once daily, Disp: 30 tablet, Rfl: 5    clopidogrel (PLAVIX) 75 mg tablet, take 1 tablet by mouth once daily, Disp: 30 tablet, Rfl: 5    DULoxetine (CYMBALTA) 60 mg delayed release capsule, TAKE 1 CAPSULE TWICE DAILY  , Disp: 60 capsule, Rfl: 5    furosemide (LASIX) 40 mg tablet, Take 1 tablet (40 mg total) by mouth daily, Disp: 30 tablet, Rfl: 3    gabapentin (NEURONTIN) 100 mg capsule, Take 1 capsule (100 mg total) by mouth 2 (two) times a day, Disp: , Rfl: 0    glimepiride (AMARYL) 4 mg tablet, Take 1 tablet by mouth 2 (two) times a day, Disp: , Rfl:     metFORMIN (GLUCOPHAGE) 500 mg tablet, Take 2 tablets (1,000 mg total) by mouth 2 (two) times a day, Disp: 120 tablet, Rfl: 5    nitroglycerin (NITROSTAT) 0 4 mg SL tablet, 1 tab(s), Disp: , Rfl:     simvastatin (ZOCOR) 40 mg tablet, take 1 tablet by mouth once daily, Disp: 30 tablet, Rfl: 5    solifenacin (VESICARE) 5 mg tablet, Take 1 tablet by mouth daily, Disp: , Rfl:     lisinopril (ZESTRIL) 20 mg tablet, Take 20 mg by mouth daily, Disp: , Rfl:     ACTIVE PROBLEM LIST:  Patient Active Problem List   Diagnosis    Allergic rhinitis    Atherosclerosis of native artery of both lower extremities with intermittent claudication (HCC)    Carotid stenosis, bilateral    Carpal tunnel syndrome on both sides    Depression    Diabetes mellitus type 2, uncontrolled ([facility] )    [facility]    Hyperlipidemia    Hypertension    Mesenteric artery stenosis (HCC)    Seborrheic keratosis    Skin lesion    Vitamin D deficiency    Cerebral infarction ([facility] )    Peripheral neuropathy    Trigger little finger of right hand    Urinary incontinence in female    Vertigo       PAST MEDICAL HISTORY:  Past Medical History:   Diagnosis Date    Colonic polyp     Diverticulosis     large intestine without hemmorhage       PAST SURGICAL HISTORY:  Past Surgical History:   Procedure Laterality Date    BREAST SURGERY      CAROTID ENDARTERECTOMY      CORONARY ANGIOPLASTY WITH STENT PLACEMENT      leg and chest    CORONARY ANGIOPLASTY WITH STENT PLACEMENT      adominal stent    CORONARY ARTERY BYPASS GRAFT      triple artery    TONSILLECTOMY         FAMILY HISTORY:  Family History   Problem Relation Age of Onset    Lung cancer Mother     Migraines Mother     Stroke Brother        SOCIAL HISTORY:  Social History     Social History    Marital status:      Spouse name: N/A    Number of children: N/A    Years of education: N/A     Occupational History          retired     Social History Main Topics    Smoking status: Former Smoker    Smokeless tobacco: Never Used    Alcohol use Yes    Drug use: No    Sexual activity: No     Other Topics Concern    Not on file     Social History Narrative    No narrative on file       Review of Systems   Respiratory: Negative for shortness of breath  Cardiovascular: Negative for chest pain  Gastrointestinal: Negative for abdominal pain           Objective:  Vitals: 06/20/18 1234   BP: 138/62   BP Location: Left arm   Patient Position: Sitting   Pulse: 73   SpO2: 94%   Weight: 56 5 kg (124 lb 9 6 oz)   Height: 4' 11" (1 499 m)        Physical Exam   Constitutional: She is oriented to person, place, and time  She appears well-developed and well-nourished  Cardiovascular: Normal rate, regular rhythm and normal heart sounds  Pulmonary/Chest: Effort normal and breath sounds normal    Abdominal: Soft  There is no tenderness  Neurological: She is alert and oriented to person, place, and time  Nursing note and vitals reviewed  RESULTS:    Recent Results (from the past 1008 hour(s))   Microalbumin / creatinine urine ratio    Collection Time: 06/06/18 11:47 AM   Result Value Ref Range    Creatinine, Ur 83 5 mg/dL    Microalbum  ,U,Random 1,360 0 (H) 0 0 - 20 0 mg/L    Microalb Creat Ratio 1,629 (H) 0 - 30 mg/g creatinine   CBC and differential    Collection Time: 06/06/18 11:47 AM   Result Value Ref Range    WBC 4 75 4 31 - 10 16 Thousand/uL    RBC 3 89 3 81 - 5 12 Million/uL    Hemoglobin 11 5 11 5 - 15 4 g/dL    Hematocrit 36 3 34 8 - 46 1 %    MCV 93 82 - 98 fL    MCH 29 6 26 8 - 34 3 pg    MCHC 31 7 31 4 - 37 4 g/dL    RDW 14 7 11 6 - 15 1 %    MPV 9 8 8 9 - 12 7 fL    Platelets 635 368 - 159 Thousands/uL    nRBC 0 /100 WBCs    Neutrophils Relative 57 43 - 75 %    Immat GRANS % 1 0 - 2 %    Lymphocytes Relative 29 14 - 44 %    Monocytes Relative 10 4 - 12 %    Eosinophils Relative 2 0 - 6 %    Basophils Relative 1 0 - 1 %    Neutrophils Absolute 2 77 1 85 - 7 62 Thousands/µL    Immature Grans Absolute 0 03 0 00 - 0 20 Thousand/uL    Lymphocytes Absolute 1 38 0 60 - 4 47 Thousands/µL    Monocytes Absolute 0 45 0 17 - 1 22 Thousand/µL    Eosinophils Absolute 0 08 0 00 - 0 61 Thousand/µL    Basophils Absolute 0 04 0 00 - 0 10 Thousands/µL   Comprehensive metabolic panel    Collection Time: 06/06/18 11:47 AM   Result Value Ref Range    Sodium 135 (L) 136 - 145 mmol/L Potassium 4 8 3 5 - 5 3 mmol/L    Chloride 101 100 - 108 mmol/L    CO2 27 21 - 32 mmol/L    Anion Gap 7 4 - 13 mmol/L    BUN 15 5 - 25 mg/dL    Creatinine 0 68 0 60 - 1 30 mg/dL    Glucose, Fasting 149 (H) 65 - 99 mg/dL    Calcium 8 8 8 3 - 10 1 mg/dL    AST 11 5 - 45 U/L    ALT 17 12 - 78 U/L    Alkaline Phosphatase 56 46 - 116 U/L    Total Protein 6 7 6 4 - 8 2 g/dL    Albumin 3 3 (L) 3 5 - 5 0 g/dL    Total Bilirubin 0 89 0 20 - 1 00 mg/dL    eGFR 83 ml/min/1 73sq m   Lipid panel    Collection Time: 06/06/18 11:47 AM   Result Value Ref Range    Cholesterol 144 50 - 200 mg/dL    Triglycerides 63 <=150 mg/dL    HDL, Direct 67 (H) 40 - 60 mg/dL    LDL Calculated 64 0 - 100 mg/dL    Non-HDL-Chol (CHOL-HDL) 77 mg/dl   HEMOGLOBIN A1C W/ EAG ESTIMATION    Collection Time: 06/06/18 11:47 AM   Result Value Ref Range    Hemoglobin A1C 7 2 (H) 4 2 - 6 3 %     mg/dl   Basic metabolic panel    Collection Time: 06/15/18 10:19 AM   Result Value Ref Range    Sodium 134 (L) 136 - 145 mmol/L    Potassium 4 4 3 5 - 5 3 mmol/L    Chloride 99 (L) 100 - 108 mmol/L    CO2 28 21 - 32 mmol/L    Anion Gap 7 4 - 13 mmol/L    BUN 17 5 - 25 mg/dL    Creatinine 0 72 0 60 - 1 30 mg/dL    Glucose, Fasting 213 (H) 65 - 99 mg/dL    Calcium 9 0 8 3 - 10 1 mg/dL    eGFR 80 ml/min/1 73sq m       This note was created with voice recognition software  Phonic, grammatical and spelling errors may be present within the note as a result

## 2018-07-02 ENCOUNTER — TELEPHONE (OUTPATIENT)
Dept: INTERNAL MEDICINE CLINIC | Facility: CLINIC | Age: 78
End: 2018-07-02

## 2018-07-11 ENCOUNTER — OFFICE VISIT (OUTPATIENT)
Dept: INTERNAL MEDICINE CLINIC | Facility: CLINIC | Age: 78
End: 2018-07-11
Payer: MEDICARE

## 2018-07-11 VITALS
WEIGHT: 127.8 LBS | HEIGHT: 59 IN | OXYGEN SATURATION: 94 % | HEART RATE: 62 BPM | TEMPERATURE: 98.7 F | RESPIRATION RATE: 20 BRPM | DIASTOLIC BLOOD PRESSURE: 82 MMHG | SYSTOLIC BLOOD PRESSURE: 128 MMHG | BODY MASS INDEX: 25.76 KG/M2

## 2018-07-11 DIAGNOSIS — E11.42 DIABETIC PERIPHERAL NEUROPATHY (HCC): ICD-10-CM

## 2018-07-11 DIAGNOSIS — IMO0001 UNCONTROLLED TYPE 2 DIABETES MELLITUS WITHOUT COMPLICATION, WITHOUT LONG-TERM CURRENT USE OF INSULIN: ICD-10-CM

## 2018-07-11 DIAGNOSIS — F41.9 ANXIETY AND DEPRESSION: ICD-10-CM

## 2018-07-11 DIAGNOSIS — M47.26 OSTEOARTHRITIS OF SPINE WITH RADICULOPATHY, LUMBAR REGION: Primary | ICD-10-CM

## 2018-07-11 DIAGNOSIS — F32.A ANXIETY AND DEPRESSION: ICD-10-CM

## 2018-07-11 PROBLEM — M47.816 DEGENERATIVE JOINT DISEASE (DJD) OF LUMBAR SPINE: Status: ACTIVE | Noted: 2018-07-11

## 2018-07-11 PROCEDURE — 99214 OFFICE O/P EST MOD 30 MIN: CPT | Performed by: INTERNAL MEDICINE

## 2018-07-11 RX ORDER — DULOXETIN HYDROCHLORIDE 60 MG/1
60 CAPSULE, DELAYED RELEASE ORAL DAILY
Qty: 60 CAPSULE | Refills: 0 | Status: SHIPPED | OUTPATIENT
Start: 2018-07-11 | End: 2018-12-17 | Stop reason: SDUPTHER

## 2018-07-11 NOTE — PROGRESS NOTES
Patient's shoes and socks removed  Right Foot/Ankle   Right Foot Inspection  Skin Exam: skin normal, skin intact and ulcer no dry skin, no warmth, no callus, no erythema, no maceration, no abnormal color, no pre-ulcer and no callus               Ulcer Size (cm): 0 5           Toe Exam: ROM and strength within normal limits  Sensory       Monofilament testing: intact  Vascular    The right PT pulse is 2+  Right Toe  - Comprehensive Exam  Ecchymosis: none  Swelling: none         Left Foot/Ankle  Left Foot Inspection  Skin Exam: skin normal and skin intactno dry skin, no warmth, no erythema, no maceration, normal color, no pre-ulcer, no ulcer and no callus                         Toe Exam: ROM and strength within normal limits                   Sensory       Monofilament: intact  Vascular    The left PT pulse is 2+     Left Toe  - Comprehensive Exam  Ecchymosis: none  Swelling: none       Assign Risk Category:  ; No loss of protective sensation;        Risk: 0

## 2018-07-11 NOTE — PROGRESS NOTES
Assessment/Plan:      Sugar and blood pressure controlled  Continue those medications  Unclear of some of her fatigue and vertigo are from the medications so I cut back her Cymbalta  Consider meclizine again if the vertigo persists  Suspect her biggest problem is the neuropathy in the legs  However, she also has significant DJD of the lumbar spine  She is going to go back to her chiropractor but we also discussed seeing if injections for the lower spine would be helpful  Return in about 3 months (around 10/11/2018)  No problem-specific Assessment & Plan notes found for this encounter  Diagnoses and all orders for this visit:    Osteoarthritis of spine with radiculopathy, lumbar region    Anxiety and depression  -     DULoxetine (CYMBALTA) 60 mg delayed release capsule; Take 1 capsule (60 mg total) by mouth daily    Diabetic peripheral neuropathy ([address] )    Uncontrolled type 2 diabetes mellitus without complication, without long-term current use of insulin ([facility])  -     CBC and differential; Future  -     Comprehensive metabolic panel; Future  -     Lipid panel; Future  -     Hemoglobin A1C; Future          Subjective:      Patient ID: Jabari Mcdonnell is a 77 y o  female  Patient comes in today for follow-up  Her sugars remained controlled  Her pressure is controlled  The water pill is still helping with the swelling  Her base complaint which is frustrating her, is the neuropathy in the lower extremities  She does have follow-up with vascular  She sees Neurology  She has known DJD of her spine  She started to get her vertigo symptoms again  Just overall frustrated with her legs since she can't walk like she used to  ALLERGIES:  Allergies   Allergen Reactions    Calcipotriene Hives    Penicillin V Hives     Category:  Allergy;     Propoxyphene Rash       CURRENT MEDICATIONS:    Current Outpatient Prescriptions:     amLODIPine (NORVASC) 5 mg tablet, take 1 tablet by mouth once daily, Disp: 30 tablet, Rfl: 5    clopidogrel (PLAVIX) 75 mg tablet, take 1 tablet by mouth once daily, Disp: 30 tablet, Rfl: 5    DULoxetine (CYMBALTA) 60 mg delayed release capsule, Take 1 capsule (60 mg total) by mouth daily, Disp: 60 capsule, Rfl: 0    furosemide (LASIX) 40 mg tablet, Take 1 tablet (40 mg total) by mouth daily, Disp: 30 tablet, Rfl: 3    gabapentin (NEURONTIN) 100 mg capsule, Take 1 capsule (100 mg total) by mouth 2 (two) times a day, Disp: , Rfl: 0    glimepiride (AMARYL) 4 mg tablet, Take 1 tablet by mouth 2 (two) times a day, Disp: , Rfl:     lisinopril (ZESTRIL) 20 mg tablet, Take 20 mg by mouth daily, Disp: , Rfl:     metFORMIN (GLUCOPHAGE) 500 mg tablet, Take 2 tablets (1,000 mg total) by mouth 2 (two) times a day, Disp: 120 tablet, Rfl: 5    nitroglycerin (NITROSTAT) 0 4 mg SL tablet, 1 tab(s), Disp: , Rfl:     simvastatin (ZOCOR) 40 mg tablet, take 1 tablet by mouth once daily, Disp: 30 tablet, Rfl: 5    solifenacin (VESICARE) 5 mg tablet, Take 1 tablet by mouth daily, Disp: , Rfl:     ACTIVE PROBLEM LIST:  Patient Active Problem List   Diagnosis    Allergic rhinitis    Atherosclerosis of native artery of both lower extremities with intermittent claudication (HCC)    Carotid stenosis, bilateral    Carpal tunnel syndrome on both sides    Depression    Diabetes mellitus type 2, uncontrolled (HCC)    Diabetic peripheral neuropathy (HCC)    Hyperlipidemia    Hypertension    Mesenteric artery stenosis (HCC)    Seborrheic keratosis    Skin lesion    Vitamin D deficiency    Cerebral infarction (HCC)    Peripheral neuropathy    Trigger little finger of right hand    Urinary incontinence in female    Vertigo    Degenerative joint disease (DJD) of lumbar spine       PAST MEDICAL HISTORY:  Past Medical History:   Diagnosis Date    Colonic polyp     Diverticulosis     large intestine without hemmorhage       PAST SURGICAL HISTORY:  Past Surgical History:   Procedure Laterality Date    BREAST SURGERY      CAROTID ENDARTERECTOMY      CORONARY ANGIOPLASTY WITH STENT PLACEMENT      leg and chest    CORONARY ANGIOPLASTY WITH STENT PLACEMENT      adominal stent    CORONARY ARTERY BYPASS GRAFT      triple artery    TONSILLECTOMY         FAMILY HISTORY:  Family History   Problem Relation Age of Onset    Lung cancer Mother     Migraines Mother     Stroke Brother        SOCIAL HISTORY:  Social History     Social History    Marital status:      Spouse name: N/A    Number of children: N/A    Years of education: N/A     Occupational History          retired     Social History Main Topics    Smoking status: Former Smoker    Smokeless tobacco: Never Used    Alcohol use Yes    Drug use: No    Sexual activity: No     Other Topics Concern    Not on file     Social History Narrative    No narrative on file       Review of Systems   Respiratory: Negative for shortness of breath  Cardiovascular: Negative for chest pain  Gastrointestinal: Negative for abdominal pain  Objective:  Vitals:    07/11/18 1357   BP: 128/82   BP Location: Left arm   Patient Position: Sitting   Cuff Size: Adult   Pulse: 62   Resp: 20   Temp: 98 7 °F (37 1 °C)   TempSrc: Temporal   SpO2: 94%   Weight: 58 kg (127 lb 12 8 oz)   Height: 4' 11" (1 499 m)        Physical Exam   Constitutional: She is oriented to person, place, and time  She appears well-developed and well-nourished  Cardiovascular: Normal rate, regular rhythm and normal heart sounds  Pulmonary/Chest: Effort normal and breath sounds normal    Abdominal: Soft  There is no tenderness  Neurological: She is alert and oriented to person, place, and time  Nursing note and vitals reviewed  RESULTS:    Recent Results (from the past 1008 hour(s))   Microalbumin / creatinine urine ratio    Collection Time: 06/06/18 11:47 AM   Result Value Ref Range    Creatinine, Ur 83 5 mg/dL    Microalbum  ,U,Random 1,360 0 (H) 0 0 - 20 0 mg/L    Microalb Creat Ratio 1,629 (H) 0 - 30 mg/g creatinine   CBC and differential    Collection Time: 06/06/18 11:47 AM   Result Value Ref Range    WBC 4 75 4 31 - 10 16 Thousand/uL    RBC 3 89 3 81 - 5 12 Million/uL    Hemoglobin 11 5 11 5 - 15 4 g/dL    Hematocrit 36 3 34 8 - 46 1 %    MCV 93 82 - 98 fL    MCH 29 6 26 8 - 34 3 pg    MCHC 31 7 31 4 - 37 4 g/dL    RDW 14 7 11 6 - 15 1 %    MPV 9 8 8 9 - 12 7 fL    Platelets 081 086 - 262 Thousands/uL    nRBC 0 /100 WBCs    Neutrophils Relative 57 43 - 75 %    Immat GRANS % 1 0 - 2 %    Lymphocytes Relative 29 14 - 44 %    Monocytes Relative 10 4 - 12 %    Eosinophils Relative 2 0 - 6 %    Basophils Relative 1 0 - 1 %    Neutrophils Absolute 2 77 1 85 - 7 62 Thousands/µL    Immature Grans Absolute 0 03 0 00 - 0 20 Thousand/uL    Lymphocytes Absolute 1 38 0 60 - 4 47 Thousands/µL    Monocytes Absolute 0 45 0 17 - 1 22 Thousand/µL    Eosinophils Absolute 0 08 0 00 - 0 61 Thousand/µL    Basophils Absolute 0 04 0 00 - 0 10 Thousands/µL   Comprehensive metabolic panel    Collection Time: 06/06/18 11:47 AM   Result Value Ref Range    Sodium 135 (L) 136 - 145 mmol/L    Potassium 4 8 3 5 - 5 3 mmol/L    Chloride 101 100 - 108 mmol/L    CO2 27 21 - 32 mmol/L    Anion Gap 7 4 - 13 mmol/L    BUN 15 5 - 25 mg/dL    Creatinine 0 68 0 60 - 1 30 mg/dL    Glucose, Fasting 149 (H) 65 - 99 mg/dL    Calcium 8 8 8 3 - 10 1 mg/dL    AST 11 5 - 45 U/L    ALT 17 12 - 78 U/L    Alkaline Phosphatase 56 46 - 116 U/L    Total Protein 6 7 6 4 - 8 2 g/dL    Albumin 3 3 (L) 3 5 - 5 0 g/dL    Total Bilirubin 0 89 0 20 - 1 00 mg/dL    eGFR 83 ml/min/1 73sq m   Lipid panel    Collection Time: 06/06/18 11:47 AM   Result Value Ref Range    Cholesterol 144 50 - 200 mg/dL    Triglycerides 63 <=150 mg/dL    HDL, Direct 67 (H) 40 - 60 mg/dL    LDL Calculated 64 0 - 100 mg/dL    Non-HDL-Chol (CHOL-HDL) 77 mg/dl   HEMOGLOBIN A1C W/ EAG ESTIMATION    Collection Time: 06/06/18 11:47 AM   Result Value Ref Range    Hemoglobin A1C 7 2 (H) 4 2 - 6 3 %     mg/dl   Basic metabolic panel    Collection Time: 06/15/18 10:19 AM   Result Value Ref Range    Sodium 134 (L) 136 - 145 mmol/L    Potassium 4 4 3 5 - 5 3 mmol/L    Chloride 99 (L) 100 - 108 mmol/L    CO2 28 21 - 32 mmol/L    Anion Gap 7 4 - 13 mmol/L    BUN 17 5 - 25 mg/dL    Creatinine 0 72 0 60 - 1 30 mg/dL    Glucose, Fasting 213 (H) 65 - 99 mg/dL    Calcium 9 0 8 3 - 10 1 mg/dL    eGFR 80 ml/min/1 73sq m       This note was created with voice recognition software  Phonic, grammatical and spelling errors may be present within the note as a result  Never smoker

## 2018-08-29 DIAGNOSIS — IMO0002 DIABETES MELLITUS TYPE 2, UNCONTROLLED: ICD-10-CM

## 2018-08-29 RX ORDER — GLIMEPIRIDE 4 MG/1
TABLET ORAL
Qty: 60 TABLET | Refills: 5 | Status: SHIPPED | OUTPATIENT
Start: 2018-08-29 | End: 2018-12-17 | Stop reason: ALTCHOICE

## 2018-12-07 ENCOUNTER — OFFICE VISIT (OUTPATIENT)
Dept: INTERNAL MEDICINE CLINIC | Facility: CLINIC | Age: 78
End: 2018-12-07
Payer: MEDICARE

## 2018-12-07 VITALS
TEMPERATURE: 98.7 F | BODY MASS INDEX: 22.62 KG/M2 | HEIGHT: 59 IN | SYSTOLIC BLOOD PRESSURE: 110 MMHG | DIASTOLIC BLOOD PRESSURE: 58 MMHG | WEIGHT: 112.2 LBS

## 2018-12-07 DIAGNOSIS — J02.9 PHARYNGITIS, UNSPECIFIED ETIOLOGY: Primary | ICD-10-CM

## 2018-12-07 DIAGNOSIS — Z86.19 HISTORY OF CLOSTRIDIUM DIFFICILE INFECTION: ICD-10-CM

## 2018-12-07 DIAGNOSIS — D64.9 ANEMIA, UNSPECIFIED TYPE: ICD-10-CM

## 2018-12-07 PROCEDURE — 99214 OFFICE O/P EST MOD 30 MIN: CPT | Performed by: INTERNAL MEDICINE

## 2018-12-07 RX ORDER — NICOTINE 21 MG/24HR
1 PATCH, TRANSDERMAL 24 HOURS TRANSDERMAL EVERY 24 HOURS
COMMUNITY
End: 2021-01-01 | Stop reason: ALTCHOICE

## 2018-12-07 NOTE — PROGRESS NOTES
Assessment/Plan:      Appears localized to her throat at this time  Most likely viral   Also explained that with her recent C diff infection, would not want to put her on antibiotics unless absolutely necessary  Her son will monitor for fever or worsening of symptoms  She is also due for blood work  Reviewed the discharge summary from [facility] meal and work before she went to rehab  There was anemia at 1 point and some electrolyte abnormality so will repeat those labs as well as checking a white count  Return in about 1 week (around 12/14/2018)  No problem-specific Assessment & Plan notes found for this encounter  Diagnoses and all orders for this visit:    Pharyngitis, unspecified etiology  -     CBC and differential; Future  -     Comprehensive metabolic panel; Future    History of Clostridium difficile infection    Anemia, unspecified type          Subjective:      Patient ID: Angela Mendoza is a 77 y o  female  Patient comes in today complaining of a sore throat  She Is with her son  In September, she had gone into the hospital for a vascular procedure which apparently went well but then she developed multiple complications  I do not have all of the records but it sounds like she was transferred to the [facility]  She was intubated at 1 point and placed on a feeding tube  Her son said they did not think she was going to make it  She eventually improved but then developed C diff infection  That was treated with vancomycin  She went for rehab for many weeks  She has been home for a few days and started complaining of a sore throat  Just a sore throat  No congestion  No chest congestion  No fevers  ALLERGIES:  Allergies   Allergen Reactions    Calcipotriene Hives    Lactose GI Intolerance    Penicillin V Hives     Category:  Allergy;     Propoxyphene Rash       CURRENT MEDICATIONS:    Current Outpatient Prescriptions:     amLODIPine (NORVASC) 5 mg tablet, take 1 tablet by mouth once daily, Disp: 30 tablet, Rfl: 5    clopidogrel (PLAVIX) 75 mg tablet, take 1 tablet by mouth once daily, Disp: 30 tablet, Rfl: 5    DULoxetine (CYMBALTA) 60 mg delayed release capsule, Take 1 capsule (60 mg total) by mouth daily, Disp: 60 capsule, Rfl: 0    furosemide (LASIX) 40 mg tablet, Take 1 tablet (40 mg total) by mouth daily, Disp: 30 tablet, Rfl: 3    gabapentin (NEURONTIN) 100 mg capsule, Take 1 capsule (100 mg total) by mouth 2 (two) times a day, Disp: , Rfl: 0    glimepiride (AMARYL) 4 mg tablet, take 1 tablet twice a day, Disp: 60 tablet, Rfl: 5    lisinopril (ZESTRIL) 20 mg tablet, Take 20 mg by mouth daily, Disp: , Rfl:     metFORMIN (GLUCOPHAGE) 500 mg tablet, Take 2 tablets (1,000 mg total) by mouth 2 (two) times a day, Disp: 120 tablet, Rfl: 5    nitroglycerin (NITROSTAT) 0 4 mg SL tablet, 1 tab(s), Disp: , Rfl:     simvastatin (ZOCOR) 40 mg tablet, take 1 tablet by mouth once daily, Disp: 30 tablet, Rfl: 5    solifenacin (VESICARE) 5 mg tablet, Take 1 tablet by mouth daily, Disp: , Rfl:     ACTIVE PROBLEM LIST:  Patient Active Problem List   Diagnosis    Allergic rhinitis    Atherosclerosis of native artery of both lower extremities with intermittent claudication (HCC)    Carotid stenosis, bilateral    Carpal tunnel syndrome on both sides    Depression    Diabetes mellitus type 2, uncontrolled (HCC)    Diabetic peripheral neuropathy (HCC)    Hyperlipidemia    Hypertension    Mesenteric artery stenosis (HCC)    Seborrheic keratosis    Skin lesion    Vitamin D deficiency    Cerebral infarction (HCC)    Peripheral neuropathy    Trigger little finger of right hand    Urinary incontinence in female    Vertigo    Degenerative joint disease (DJD) of lumbar spine       PAST MEDICAL HISTORY:  Past Medical History:   Diagnosis Date    Colonic polyp     Diverticulosis     large intestine without hemmorhage       PAST SURGICAL HISTORY:  Past Surgical History: Procedure Laterality Date    BREAST SURGERY      CAROTID ENDARTERECTOMY      CORONARY ANGIOPLASTY WITH STENT PLACEMENT      leg and chest    CORONARY ANGIOPLASTY WITH STENT PLACEMENT      adominal stent    CORONARY ARTERY BYPASS GRAFT      triple artery    TONSILLECTOMY         FAMILY HISTORY:  Family History   Problem Relation Age of Onset    Lung cancer Mother     Migraines Mother     Stroke Brother        SOCIAL HISTORY:  Social History     Social History    Marital status:      Spouse name: N/A    Number of children: N/A    Years of education: N/A     Occupational History          retired     Social History Main Topics    Smoking status: Former Smoker    Smokeless tobacco: Never Used    Alcohol use Yes    Drug use: No    Sexual activity: No     Other Topics Concern    Not on file     Social History Narrative    No narrative on file       Review of Systems   Constitutional: Negative for fever  HENT: Positive for sore throat  Negative for congestion  Respiratory: Negative for shortness of breath  Objective:  Vitals:    12/07/18 1412   BP: 110/58   BP Location: Left arm   Patient Position: Sitting   Temp: 98 7 °F (37 1 °C)   Weight: 50 9 kg (112 lb 3 2 oz)   Height: 4' 11" (1 499 m)     Body mass index is 22 66 kg/m²  Physical Exam   Constitutional: She is oriented to person, place, and time  Looks exhausted   HENT:   Mouth/Throat: Oropharynx is clear and moist  No oropharyngeal exudate  Cardiovascular: Normal rate, regular rhythm and normal heart sounds  Pulmonary/Chest: Effort normal and breath sounds normal    Neurological: She is alert and oriented to person, place, and time  Nursing note and vitals reviewed  RESULTS:    No results found for this or any previous visit (from the past 1008 hour(s))  This note was created with voice recognition software  Phonic, grammatical and spelling errors may be present within the note as a result

## 2018-12-13 ENCOUNTER — TELEPHONE (OUTPATIENT)
Dept: INTERNAL MEDICINE CLINIC | Facility: CLINIC | Age: 78
End: 2018-12-13

## 2018-12-13 NOTE — TELEPHONE ENCOUNTER
Called son he said okay that's great he states that she is at [facility] her sugars this morning was 36 and when checked at the hospital and 11 son said they are admitting

## 2018-12-13 NOTE — TELEPHONE ENCOUNTER
Pt's son called office stating that Rachell Pfeiffer was rushed to the ER today due to her sugar being so low   Pt's son wanted to know if we could send in for the [facility] diabetic testing machine so they can check her sugar faster

## 2018-12-13 NOTE — TELEPHONE ENCOUNTER
We can discuss at her followup visit  Medicare has been restricting those machines so they want to see documentation in the chart at the visits  Also, she should probably cut her glimepiride in 1/2 since she is getting the low sugars

## 2018-12-17 ENCOUNTER — TELEPHONE (OUTPATIENT)
Dept: INTERNAL MEDICINE CLINIC | Facility: CLINIC | Age: 78
End: 2018-12-17

## 2018-12-17 ENCOUNTER — OFFICE VISIT (OUTPATIENT)
Dept: INTERNAL MEDICINE CLINIC | Facility: CLINIC | Age: 78
End: 2018-12-17
Payer: MEDICARE

## 2018-12-17 VITALS
SYSTOLIC BLOOD PRESSURE: 108 MMHG | DIASTOLIC BLOOD PRESSURE: 60 MMHG | HEIGHT: 59 IN | BODY MASS INDEX: 22.78 KG/M2 | WEIGHT: 113 LBS

## 2018-12-17 DIAGNOSIS — I73.9 PAD (PERIPHERAL ARTERY DISEASE) (HCC): ICD-10-CM

## 2018-12-17 DIAGNOSIS — IMO0001 UNCONTROLLED TYPE 2 DIABETES MELLITUS WITHOUT COMPLICATION, WITHOUT LONG-TERM CURRENT USE OF INSULIN: ICD-10-CM

## 2018-12-17 DIAGNOSIS — E11.42 DIABETIC PERIPHERAL NEUROPATHY (HCC): ICD-10-CM

## 2018-12-17 DIAGNOSIS — Z23 NEED FOR VACCINATION: ICD-10-CM

## 2018-12-17 DIAGNOSIS — G62.9 PERIPHERAL POLYNEUROPATHY: ICD-10-CM

## 2018-12-17 DIAGNOSIS — R20.8 COMPLAINING OF COLD HANDS: ICD-10-CM

## 2018-12-17 DIAGNOSIS — F41.9 ANXIETY AND DEPRESSION: ICD-10-CM

## 2018-12-17 DIAGNOSIS — E11.649 UNCONTROLLED TYPE 2 DIABETES MELLITUS WITH HYPOGLYCEMIA WITHOUT COMA (HCC): Primary | ICD-10-CM

## 2018-12-17 DIAGNOSIS — F32.A ANXIETY AND DEPRESSION: ICD-10-CM

## 2018-12-17 PROCEDURE — G0008 ADMIN INFLUENZA VIRUS VAC: HCPCS | Performed by: INTERNAL MEDICINE

## 2018-12-17 PROCEDURE — 99214 OFFICE O/P EST MOD 30 MIN: CPT | Performed by: INTERNAL MEDICINE

## 2018-12-17 PROCEDURE — 90662 IIV NO PRSV INCREASED AG IM: CPT | Performed by: INTERNAL MEDICINE

## 2018-12-17 RX ORDER — FLASH GLUCOSE SENSOR
KIT MISCELLANEOUS
Qty: 3 EACH | Refills: 0 | Status: SHIPPED | OUTPATIENT
Start: 2018-12-17 | End: 2021-05-11 | Stop reason: ALTCHOICE

## 2018-12-17 RX ORDER — LISINOPRIL 20 MG/1
40 TABLET ORAL DAILY
Refills: 0
Start: 2018-12-17 | End: 2018-12-19 | Stop reason: SDUPTHER

## 2018-12-17 RX ORDER — FERROUS SULFATE 325(65) MG
325 TABLET ORAL
COMMUNITY
Start: 2018-11-08 | End: 2021-05-13 | Stop reason: SDUPTHER

## 2018-12-17 RX ORDER — FLASH GLUCOSE SENSOR
1 KIT MISCELLANEOUS 4 TIMES DAILY
Qty: 1 DEVICE | Refills: 0 | Status: SHIPPED | OUTPATIENT
Start: 2018-12-17 | End: 2021-01-01 | Stop reason: SDUPTHER

## 2018-12-17 RX ORDER — DULOXETIN HYDROCHLORIDE 60 MG/1
60 CAPSULE, DELAYED RELEASE ORAL 2 TIMES DAILY
Qty: 60 CAPSULE | Refills: 0
Start: 2018-12-17 | End: 2018-12-19 | Stop reason: SDUPTHER

## 2018-12-17 NOTE — PROGRESS NOTES
Assessment/Plan:      Since she has now been getting low sugars, agree with staying off of the glimepiride for now  Also agree with a lower dose of metformin  As her diet improves over time, we may need to increase her metformin again  Because of her cold hands and the discomfort, she has been unable to do fingerstick testing of her sugars  Also, because of her recent hospitalization and change in her situation, her sugars have become more variable  We need to test her more frequently  Unclear etiology for her cold hands  Could be from her beta-blocker  Could be from her smoking history  She has been off the cigarettes for a few months now  Reinforced not to resume  She does have good distal pulses  Wanted to consider vasculitis and get a sed rate  Reviewed hospital records, there were no recent ones done  She is reluctant to go for blood work because of her recent hospitalization and difficulty with having blood drawn since then  Return in about 2 weeks (around 12/31/2018)  No problem-specific Assessment & Plan notes found for this encounter  Diagnoses and all orders for this visit:    Uncontrolled type 2 diabetes mellitus with hypoglycemia without coma ([facility] )  -     Continuous Blood Gluc  (FREESTYLE RIAZ READER) MAURY; 1 Units by Does not apply route 4 (four) times a day  -     Continuous Blood Gluc Sensor ([address] [place] [address]) MISC; Check sugars 4 times a day or as needed  Uncontrolled type 2 diabetes mellitus without complication, without long-term current use of insulin (HCC)  -     metFORMIN (GLUCOPHAGE) 500 mg tablet;  Take 1 tablet (500 mg total) by mouth daily with breakfast    Diabetic peripheral neuropathy (HCC)    Peripheral polyneuropathy    PAD (peripheral artery disease) ([facility] )    Need for vaccination  -     influenza vaccine, [id number], high-dose, PF 0 5 mL, for patients 65 yr+ (FLUZONE HIGH-DOSE)    Complaining of cold hands          Subjective: Patient ID: Vanessa Chan is a 77 y o  female  Patient comes in today for follow-up with her son  She had a very low sugar episode last week and had to be taken to the emergency room  She is now off of her glimepiride  She was on the glimepiride because her sugars had not been controlled in the past and she was not willing to go on insulin  Also, newer agents were more expensive for her so we were stuck with using the glimepiride  Her sugars appear to be doing okay but she is having trouble testing her sugars with fingersticks  She complains of very cold hands  She states this has been for several months  She is wearing gloves and hand warmers  But her finger tips still feel cold to her  She is on no new medicines  She has been on a beta-blocker for a while  She was a smoker  She does follow with vascular surgery  No mention of any vasculitis disorders  ALLERGIES:  Allergies   Allergen Reactions    Calcipotriene Hives    Lactose GI Intolerance    Penicillin V Hives     Category:  Allergy;     Propoxyphene Rash       CURRENT MEDICATIONS:    Current Outpatient Prescriptions:     amLODIPine (NORVASC) 5 mg tablet, take 1 tablet by mouth once daily, Disp: 30 tablet, Rfl: 5    clopidogrel (PLAVIX) 75 mg tablet, take 1 tablet by mouth once daily, Disp: 30 tablet, Rfl: 5    DULoxetine (CYMBALTA) 60 mg delayed release capsule, Take 1 capsule (60 mg total) by mouth daily, Disp: 60 capsule, Rfl: 0    furosemide (LASIX) 40 mg tablet, Take 1 tablet (40 mg total) by mouth daily, Disp: 30 tablet, Rfl: 3    gabapentin (NEURONTIN) 100 mg capsule, Take 1 capsule (100 mg total) by mouth 2 (two) times a day, Disp: , Rfl: 0    lisinopril (ZESTRIL) 20 mg tablet, Take 20 mg by mouth daily, Disp: , Rfl:     metFORMIN (GLUCOPHAGE) 500 mg tablet, Take 1 tablet (500 mg total) by mouth daily with breakfast, Disp: 120 tablet, Rfl: 0    nitroglycerin (NITROSTAT) 0 4 mg SL tablet, 1 tab(s), Disp: , Rfl:    simvastatin (ZOCOR) 40 mg tablet, take 1 tablet by mouth once daily, Disp: 30 tablet, Rfl: 5    solifenacin (VESICARE) 5 mg tablet, Take 1 tablet by mouth daily, Disp: , Rfl:     Continuous Blood Gluc  (FREESTYLE RIAZ READER) MAURY, 1 Units by Does not apply route 4 (four) times a day, Disp: 1 Device, Rfl: 0    Continuous Blood Gluc Sensor (FREESTYLE RIAZ SENSOR SYSTEM) MISC, Check sugars 4 times a day or as needed  , Disp: 3 each, Rfl: 0    nicotine (NICODERM CQ) 14 mg/24hr TD 24 hr patch, Place 1 patch on the skin every 24 hours, Disp: , Rfl:     ACTIVE PROBLEM LIST:  Patient Active Problem List   Diagnosis    Allergic rhinitis    Atherosclerosis of native artery of both lower extremities with intermittent claudication (HCC)    Carotid stenosis, bilateral    Carpal tunnel syndrome on both sides    Depression    Diabetes mellitus type 2, uncontrolled ([address] )    Diabetic peripheral neuropathy ([address] )    Hyperlipidemia    Hypertension    Mesenteric artery stenosis (HCC)    Seborrheic keratosis    Skin lesion    Vitamin D deficiency    Cerebral infarction ([address] )    Peripheral neuropathy    Trigger little finger of right hand    Urinary incontinence in female    Vertigo    Degenerative joint disease (DJD) of lumbar spine       PAST MEDICAL HISTORY:  Past Medical History:   Diagnosis Date    Colonic polyp     Diverticulosis     large intestine without hemmorhage       PAST SURGICAL HISTORY:  Past Surgical History:   Procedure Laterality Date    BREAST SURGERY      CAROTID ENDARTERECTOMY      CORONARY ANGIOPLASTY WITH STENT PLACEMENT      leg and chest    CORONARY ANGIOPLASTY WITH STENT PLACEMENT      adominal stent    CORONARY ARTERY BYPASS GRAFT      triple artery    TONSILLECTOMY         FAMILY HISTORY:  Family History   Problem Relation Age of Onset    Lung cancer Mother     Migraines Mother     Stroke Brother        SOCIAL HISTORY:  Social History     Social History    Marital status:      Spouse name: N/A    Number of children: N/A    Years of education: N/A     Occupational History          retired     Social History Main Topics    Smoking status: Former Smoker    Smokeless tobacco: Never Used    Alcohol use Yes    Drug use: No    Sexual activity: No     Other Topics Concern    Not on file     Social History Narrative    No narrative on file       Review of Systems   Respiratory: Negative for shortness of breath  Cardiovascular: Negative for chest pain  Gastrointestinal: Negative for abdominal pain  Objective:  Vitals:    12/17/18 1129   BP: 108/60   BP Location: Left arm   Patient Position: Sitting   Weight: 51 3 kg (113 lb)   Height: 4' 11" (1 499 m)     Body mass index is 22 82 kg/m²  Physical Exam   Constitutional: She is oriented to person, place, and time  She appears well-developed and well-nourished  Cardiovascular: Normal rate, regular rhythm, normal heart sounds and intact distal pulses  Pulmonary/Chest: Effort normal and breath sounds normal    Abdominal: Soft  There is no tenderness  Neurological: She is alert and oriented to person, place, and time  Nursing note and vitals reviewed  RESULTS:    No results found for this or any previous visit (from the past 1008 hour(s))  This note was created with voice recognition software  Phonic, grammatical and spelling errors may be present within the note as a result

## 2018-12-17 NOTE — TELEPHONE ENCOUNTER
Spoke to GIGAS at Dr Kassandra Whitaker office and she said that she is on metoprolol 25mg since her hospitalization and is also on amlodipine 2 5mg

## 2018-12-17 NOTE — TELEPHONE ENCOUNTER
Call pt or her son, have her break the metoprolol in 1/2 so she will take a 1/2 tablet twice a day  I want to see if that helps her hands

## 2018-12-18 ENCOUNTER — TELEPHONE (OUTPATIENT)
Dept: INTERNAL MEDICINE CLINIC | Facility: CLINIC | Age: 78
End: 2018-12-18

## 2018-12-18 NOTE — TELEPHONE ENCOUNTER
Caro from [facility] wants to notify DR that Pt refuses occupational therapy, She says she's too busy and its overwhelming

## 2018-12-19 DIAGNOSIS — I10 ESSENTIAL HYPERTENSION: ICD-10-CM

## 2018-12-19 DIAGNOSIS — F32.A ANXIETY AND DEPRESSION: ICD-10-CM

## 2018-12-19 DIAGNOSIS — E11.649 UNCONTROLLED TYPE 2 DIABETES MELLITUS WITH HYPOGLYCEMIA WITHOUT COMA (HCC): ICD-10-CM

## 2018-12-19 DIAGNOSIS — F41.9 ANXIETY AND DEPRESSION: ICD-10-CM

## 2018-12-19 DIAGNOSIS — I73.9 PAD (PERIPHERAL ARTERY DISEASE) (HCC): ICD-10-CM

## 2018-12-19 DIAGNOSIS — E78.2 MIXED HYPERLIPIDEMIA: ICD-10-CM

## 2018-12-19 DIAGNOSIS — IMO0001 UNCONTROLLED TYPE 2 DIABETES MELLITUS WITHOUT COMPLICATION, WITHOUT LONG-TERM CURRENT USE OF INSULIN: ICD-10-CM

## 2018-12-19 RX ORDER — AMLODIPINE BESYLATE 5 MG/1
5 TABLET ORAL DAILY
Qty: 30 TABLET | Refills: 3 | Status: SHIPPED | OUTPATIENT
Start: 2018-12-19 | End: 2019-04-19 | Stop reason: SDUPTHER

## 2018-12-19 RX ORDER — CLOPIDOGREL BISULFATE 75 MG/1
75 TABLET ORAL DAILY
Qty: 30 TABLET | Refills: 3 | Status: SHIPPED | OUTPATIENT
Start: 2018-12-19 | End: 2019-04-19 | Stop reason: SDUPTHER

## 2018-12-19 RX ORDER — SIMVASTATIN 40 MG
40 TABLET ORAL DAILY
Qty: 30 TABLET | Refills: 3 | Status: SHIPPED | OUTPATIENT
Start: 2018-12-19 | End: 2019-03-21 | Stop reason: SDUPTHER

## 2018-12-19 RX ORDER — DULOXETIN HYDROCHLORIDE 60 MG/1
60 CAPSULE, DELAYED RELEASE ORAL 2 TIMES DAILY
Qty: 60 CAPSULE | Refills: 3 | Status: SHIPPED | OUTPATIENT
Start: 2018-12-19 | End: 2019-04-19 | Stop reason: SDUPTHER

## 2018-12-19 NOTE — TELEPHONE ENCOUNTER
Medication refill request please send to [address]    DULOXETINE   LISINOPRIL  METFORMIN  AMLODIPINE  CLOPIDOGREL  SIMVASTATIN  meclizine

## 2018-12-20 RX ORDER — LISINOPRIL 20 MG/1
40 TABLET ORAL DAILY
Qty: 60 TABLET | Refills: 3 | Status: SHIPPED | OUTPATIENT
Start: 2018-12-20 | End: 2019-03-21 | Stop reason: SDUPTHER

## 2018-12-20 NOTE — TELEPHONE ENCOUNTER
Called pt she states that she does not no she thinks its 20mg she said she doesn't have the bottle to check will try the pharmacy to see what they have on record

## 2019-01-03 ENCOUNTER — OFFICE VISIT (OUTPATIENT)
Dept: INTERNAL MEDICINE CLINIC | Facility: CLINIC | Age: 79
End: 2019-01-03
Payer: MEDICARE

## 2019-01-03 VITALS
HEART RATE: 67 BPM | BODY MASS INDEX: 24.39 KG/M2 | OXYGEN SATURATION: 97 % | SYSTOLIC BLOOD PRESSURE: 140 MMHG | DIASTOLIC BLOOD PRESSURE: 60 MMHG | HEIGHT: 59 IN | WEIGHT: 121 LBS

## 2019-01-03 DIAGNOSIS — E11.649 UNCONTROLLED TYPE 2 DIABETES MELLITUS WITH HYPOGLYCEMIA WITHOUT COMA (HCC): Primary | ICD-10-CM

## 2019-01-03 DIAGNOSIS — R32 URINARY INCONTINENCE IN FEMALE: ICD-10-CM

## 2019-01-03 DIAGNOSIS — R20.8 COMPLAINING OF COLD HANDS: ICD-10-CM

## 2019-01-03 PROCEDURE — 99214 OFFICE O/P EST MOD 30 MIN: CPT | Performed by: INTERNAL MEDICINE

## 2019-01-03 RX ORDER — SOLIFENACIN SUCCINATE 5 MG/1
5 TABLET, FILM COATED ORAL DAILY
Qty: 30 TABLET | Refills: 3 | Status: SHIPPED | OUTPATIENT
Start: 2019-01-03 | End: 2019-04-26 | Stop reason: SDUPTHER

## 2019-01-03 NOTE — PROGRESS NOTES
Assessment/Plan:    Doing very well at this point  No change in medications  Encouraged her not to  the cigarettes again  Continue follow-up with her specialist   Continue her present diet  If sugars go up, we can discuss slowly increasing her metformin, but for now, no changes  BMI Counseling: Body mass index is 24 44 kg/m²  Discussed the patient's BMI with her  The BMI is in the acceptable range  Return in about 3 months (around 4/3/2019)  No problem-specific Assessment & Plan notes found for this encounter  Diagnoses and all orders for this visit:    Uncontrolled type 2 diabetes mellitus with hypoglycemia without coma ([facility] [address] )  -     CBC and differential; Future  -     Comprehensive metabolic panel; Future  -     Hemoglobin A1C; Future  -     Lipid panel; Future    Urinary incontinence in female  -     solifenacin (VESICARE) 5 mg tablet; Take 1 tablet (5 mg total) by mouth daily    Complaining of cold hands          Subjective:      Patient ID: Belinda Ward is a 77 y o  female  Patient comes in today for follow-up  She looks very good and she states she feels great  Her sugars are doing very well  She still has not picked up any cigarettes  Interestingly, this means she is eating more but the sugars are still good  She is taking all of her medicines as directed  She even feels that the cold hands are getting better  She is not sure if she is taking the metoprolol which I thought might have been causing her cold hands  This was on her discharge paperwork  Cardiology confirm this as well  She has an appointment with them next week  No trouble with her breathing  She is not using her walker  ALLERGIES:  Allergies   Allergen Reactions    Calcipotriene Hives    Lactose GI Intolerance    Penicillin V Hives     Category:  Allergy;     Propoxyphene Rash       CURRENT MEDICATIONS:    Current Outpatient Prescriptions:     amLODIPine (NORVASC) 5 mg tablet, Take 1 tablet (5 mg total) by mouth daily, Disp: 30 tablet, Rfl: 3    clopidogrel (PLAVIX) 75 mg tablet, Take 1 tablet (75 mg total) by mouth daily, Disp: 30 tablet, Rfl: 3    Continuous Blood Gluc  (FREESTYLE RIAZ READER) MAURY, 1 Units by Does not apply route 4 (four) times a day, Disp: 1 Device, Rfl: 0    Continuous Blood Gluc Sensor (FREESTYLE RIAZ SENSOR SYSTEM) MISC, Check sugars 4 times a day or as needed  , Disp: 3 each, Rfl: 0    DULoxetine (CYMBALTA) 60 mg delayed release capsule, Take 1 capsule (60 mg total) by mouth 2 (two) times a day, Disp: 60 capsule, Rfl: 3    ferrous sulfate 325 (65 Fe) mg tablet, Take 325 mg by mouth, Disp: , Rfl:     furosemide (LASIX) 40 mg tablet, Take 1 tablet (40 mg total) by mouth daily, Disp: 30 tablet, Rfl: 3    gabapentin (NEURONTIN) 100 mg capsule, Take 1 capsule (100 mg total) by mouth 2 (two) times a day, Disp: , Rfl: 0    lisinopril (ZESTRIL) 20 mg tablet, Take 2 tablets (40 mg total) by mouth daily, Disp: 60 tablet, Rfl: 3    metFORMIN (GLUCOPHAGE) 500 mg tablet, Take 1 tablet (500 mg total) by mouth daily with breakfast, Disp: 30 tablet, Rfl: 3    metoprolol tartrate (LOPRESSOR) 25 mg tablet, Take 25 mg by mouth 2 (two) times a day, Disp: , Rfl:     nicotine (NICODERM CQ) 14 mg/24hr TD 24 hr patch, Place 1 patch on the skin every 24 hours, Disp: , Rfl:     nitroglycerin (NITROSTAT) 0 4 mg SL tablet, 1 tab(s), Disp: , Rfl:     simvastatin (ZOCOR) 40 mg tablet, Take 1 tablet (40 mg total) by mouth daily, Disp: 30 tablet, Rfl: 3    solifenacin (VESICARE) 5 mg tablet, Take 1 tablet (5 mg total) by mouth daily, Disp: 30 tablet, Rfl: 3    ACTIVE PROBLEM LIST:  Patient Active Problem List   Diagnosis    Allergic rhinitis    Atherosclerosis of native artery of both lower extremities with intermittent claudication (HCC)    Carotid stenosis, bilateral    Carpal tunnel syndrome on both sides    Depression    Diabetes mellitus type 2, uncontrolled ([address] )    Diabetic peripheral neuropathy (HCC)    Hyperlipidemia    Hypertension    Mesenteric artery stenosis (HCC)    Seborrheic keratosis    Skin lesion    Vitamin D deficiency    Cerebral infarction (HCC)    Peripheral neuropathy    Trigger little finger of right hand    Urinary incontinence in female    Vertigo    Degenerative joint disease (DJD) of lumbar spine       PAST MEDICAL HISTORY:  Past Medical History:   Diagnosis Date    Colonic polyp     Diverticulosis     large intestine without hemmorhage       PAST SURGICAL HISTORY:  Past Surgical History:   Procedure Laterality Date    BREAST SURGERY      CAROTID ENDARTERECTOMY      CORONARY ANGIOPLASTY WITH STENT PLACEMENT      leg and chest    CORONARY ANGIOPLASTY WITH STENT PLACEMENT      adominal stent    CORONARY ARTERY BYPASS GRAFT      triple artery    TONSILLECTOMY         FAMILY HISTORY:  Family History   Problem Relation Age of Onset    Lung cancer Mother     Migraines Mother     Stroke Brother        SOCIAL HISTORY:  Social History     Social History    Marital status:      Spouse name: N/A    Number of children: N/A    Years of education: N/A     Occupational History          retired     Social History Main Topics    Smoking status: Former Smoker    Smokeless tobacco: Never Used    Alcohol use Yes    Drug use: No    Sexual activity: No     Other Topics Concern    Not on file     Social History Narrative    No narrative on file       Review of Systems   Respiratory: Negative for shortness of breath  Cardiovascular: Negative for chest pain  Gastrointestinal: Negative for abdominal pain  Objective:  Vitals:    01/03/19 1418   BP: 140/60   BP Location: Left arm   Patient Position: Sitting   Pulse: 67   SpO2: 97%   Weight: 54 9 kg (121 lb)   Height: 4' 11" (1 499 m)     Body mass index is 24 44 kg/m²  Physical Exam   Constitutional: She is oriented to person, place, and time   She appears well-developed and well-nourished  She looks very good compared to her last 2 visits  Cardiovascular: Normal rate, regular rhythm and normal heart sounds  Pulmonary/Chest: Effort normal and breath sounds normal    Abdominal: Soft  There is no tenderness  Neurological: She is alert and oriented to person, place, and time  Nursing note and vitals reviewed  RESULTS:    No results found for this or any previous visit (from the past 1008 hour(s))  This note was created with voice recognition software  Phonic, grammatical and spelling errors may be present within the note as a result

## 2019-01-18 ENCOUNTER — TELEPHONE (OUTPATIENT)
Dept: INTERNAL MEDICINE CLINIC | Facility: CLINIC | Age: 79
End: 2019-01-18

## 2019-01-18 NOTE — TELEPHONE ENCOUNTER
Please call,  No, it's below 200  She's recently had trouble with very low sugars  Only if it doesn't come down after a week would I increase metformin

## 2019-01-18 NOTE — TELEPHONE ENCOUNTER
Brandon Chaidez called and said patient's Blood Sugar is running high: [phone]  They wanted to know if you wanted to change her Metformin      Please call patient with instruction @ [phone]

## 2019-01-28 ENCOUNTER — TELEPHONE (OUTPATIENT)
Dept: INTERNAL MEDICINE CLINIC | Facility: CLINIC | Age: 79
End: 2019-01-28

## 2019-01-28 NOTE — TELEPHONE ENCOUNTER
Pt stated she started medication on 01/03/2019, Pt says she feels fine but blood sugars are running high   Today it was close to 300, yesterday she says it was 290

## 2019-02-07 ENCOUNTER — OFFICE VISIT (OUTPATIENT)
Dept: INTERNAL MEDICINE CLINIC | Facility: CLINIC | Age: 79
End: 2019-02-07
Payer: MEDICARE

## 2019-02-07 VITALS
DIASTOLIC BLOOD PRESSURE: 58 MMHG | HEIGHT: 59 IN | RESPIRATION RATE: 18 BRPM | WEIGHT: 124 LBS | HEART RATE: 108 BPM | SYSTOLIC BLOOD PRESSURE: 118 MMHG | BODY MASS INDEX: 25 KG/M2

## 2019-02-07 DIAGNOSIS — E11.649 UNCONTROLLED TYPE 2 DIABETES MELLITUS WITH HYPOGLYCEMIA WITHOUT COMA (HCC): Primary | ICD-10-CM

## 2019-02-07 DIAGNOSIS — I10 ESSENTIAL HYPERTENSION: ICD-10-CM

## 2019-02-07 DIAGNOSIS — IMO0001 UNCONTROLLED TYPE 2 DIABETES MELLITUS WITHOUT COMPLICATION, WITHOUT LONG-TERM CURRENT USE OF INSULIN: ICD-10-CM

## 2019-02-07 PROCEDURE — 99214 OFFICE O/P EST MOD 30 MIN: CPT | Performed by: INTERNAL MEDICINE

## 2019-02-07 NOTE — PROGRESS NOTES
Assessment/Plan:     Long discussion the patient and her son  Both her son and I believe she would benefit from starting insulin  She was still a little reluctant  So we will 1st try maximizing the metformin  I told her we cannot use the glimepiride because she already bottomed out once on it  BMI Counseling: Body mass index is 25 04 kg/m²  Discussed the patient's BMI with her  The BMI is above average  BMI counseling and education was provided to the patient  Nutrition recommendations include moderation in carbohydrate intake  Return in about 4 weeks (around 3/7/2019)  No problem-specific Assessment & Plan notes found for this encounter  Diagnoses and all orders for this visit:    Uncontrolled type 2 diabetes mellitus with hypoglycemia without coma ([facility] [address] )    Essential hypertension    Uncontrolled type 2 diabetes mellitus without complication, without long-term current use of insulin ([facility])  -     metFORMIN (GLUCOPHAGE) 1000 MG tablet; Take 1 tablet (1,000 mg total) by mouth 2 (two) times a day with meals          Subjective:      Patient ID: Tracy Rinaldi is a 77 y o  female  Patient comes in today for follow-up with her son  Her sugars have started to go up again  We tried doubling her metformin but they are still running high  She feels okay  But her sugars are returning to where they were in the past that made us try glimepiride  Her diet is not poor  She is active  ALLERGIES:  Allergies   Allergen Reactions    Calcipotriene Hives    Lactose GI Intolerance    Penicillin V Hives     Category:  Allergy;     Propoxyphene Rash       CURRENT MEDICATIONS:    Current Outpatient Prescriptions:     amLODIPine (NORVASC) 5 mg tablet, Take 1 tablet (5 mg total) by mouth daily, Disp: 30 tablet, Rfl: 3    clopidogrel (PLAVIX) 75 mg tablet, Take 1 tablet (75 mg total) by mouth daily, Disp: 30 tablet, Rfl: 3    Continuous Blood Gluc  (FREESTYLE RIAZ READER) MAURY, 1 Units by Does not apply route 4 (four) times a day, Disp: 1 Device, Rfl: 0    Continuous Blood Gluc Sensor (FREESTYLE RIAZ SENSOR SYSTEM) MISC, Check sugars 4 times a day or as needed  , Disp: 3 each, Rfl: 0    DULoxetine (CYMBALTA) 60 mg delayed release capsule, Take 1 capsule (60 mg total) by mouth 2 (two) times a day, Disp: 60 capsule, Rfl: 3    ferrous sulfate 325 (65 Fe) mg tablet, Take 325 mg by mouth, Disp: , Rfl:     furosemide (LASIX) 40 mg tablet, Take 1 tablet (40 mg total) by mouth daily, Disp: 30 tablet, Rfl: 3    gabapentin (NEURONTIN) 100 mg capsule, Take 1 capsule (100 mg total) by mouth 2 (two) times a day, Disp: , Rfl: 0    lisinopril (ZESTRIL) 20 mg tablet, Take 2 tablets (40 mg total) by mouth daily, Disp: 60 tablet, Rfl: 3    metFORMIN (GLUCOPHAGE) 1000 MG tablet, Take 1 tablet (1,000 mg total) by mouth 2 (two) times a day with meals, Disp: 60 tablet, Rfl: 3    nitroglycerin (NITROSTAT) 0 4 mg SL tablet, 1 tab(s), Disp: , Rfl:     simvastatin (ZOCOR) 40 mg tablet, Take 1 tablet (40 mg total) by mouth daily, Disp: 30 tablet, Rfl: 3    solifenacin (VESICARE) 5 mg tablet, Take 1 tablet (5 mg total) by mouth daily, Disp: 30 tablet, Rfl: 3    metoprolol tartrate (LOPRESSOR) 25 mg tablet, Take 25 mg by mouth 2 (two) times a day, Disp: , Rfl:     nicotine (NICODERM CQ) 14 mg/24hr TD 24 hr patch, Place 1 patch on the skin every 24 hours, Disp: , Rfl:     ACTIVE PROBLEM LIST:  Patient Active Problem List   Diagnosis    Allergic rhinitis    Atherosclerosis of native artery of both lower extremities with intermittent claudication (HCC)    Carotid stenosis, bilateral    Carpal tunnel syndrome on both sides    Depression    Diabetes mellitus type 2, uncontrolled (HCC)    Diabetic peripheral neuropathy (HCC)    Hyperlipidemia    Essential hypertension    Mesenteric artery stenosis (HCC)    Seborrheic keratosis    Skin lesion    Vitamin D deficiency    Cerebral infarction ([facility] [address] )    Peripheral neuropathy    Trigger little finger of right hand    Urinary incontinence in female    Vertigo    Degenerative joint disease (DJD) of lumbar spine       PAST MEDICAL HISTORY:  Past Medical History:   Diagnosis Date    Colonic polyp     Diverticulosis     large intestine without hemmorhage       PAST SURGICAL HISTORY:  Past Surgical History:   Procedure Laterality Date    BREAST SURGERY      CAROTID ENDARTERECTOMY      CORONARY ANGIOPLASTY WITH STENT PLACEMENT      leg and chest    CORONARY ANGIOPLASTY WITH STENT PLACEMENT      adominal stent    CORONARY ARTERY BYPASS GRAFT      triple artery    TONSILLECTOMY         FAMILY HISTORY:  Family History   Problem Relation Age of Onset    Lung cancer Mother     Migraines Mother     Stroke Brother        SOCIAL HISTORY:  Social History     Social History    Marital status:      Spouse name: N/A    Number of children: N/A    Years of education: N/A     Occupational History          retired     Social History Main Topics    Smoking status: Former Smoker    Smokeless tobacco: Never Used    Alcohol use Yes    Drug use: No    Sexual activity: No     Other Topics Concern    Not on file     Social History Narrative    No narrative on file       Review of Systems   Respiratory: Negative for shortness of breath  Cardiovascular: Negative for chest pain  Gastrointestinal: Negative for abdominal pain  Objective:  Vitals:    02/07/19 1429   BP: 118/58   BP Location: Left arm   Patient Position: Sitting   Cuff Size: Adult   Pulse: (!) 108   Resp: 18   Weight: 56 2 kg (124 lb)   Height: 4' 11" (1 499 m)     Body mass index is 25 04 kg/m²  Physical Exam   Constitutional: She is oriented to person, place, and time  She appears well-developed and well-nourished  Cardiovascular: Normal rate, regular rhythm and normal heart sounds  Pulmonary/Chest: Effort normal and breath sounds normal    Abdominal: Soft  There is no tenderness  Neurological: She is alert and oriented to person, place, and time  Psychiatric: She has a normal mood and affect  Nursing note and vitals reviewed  RESULTS:    No results found for this or any previous visit (from the past 1008 hour(s))  This note was created with voice recognition software  Phonic, grammatical and spelling errors may be present within the note as a result

## 2019-02-22 ENCOUNTER — TELEPHONE (OUTPATIENT)
Dept: INTERNAL MEDICINE CLINIC | Facility: CLINIC | Age: 79
End: 2019-02-22

## 2019-02-22 DIAGNOSIS — E11.649 UNCONTROLLED TYPE 2 DIABETES MELLITUS WITH HYPOGLYCEMIA WITHOUT COMA (HCC): Primary | ICD-10-CM

## 2019-02-22 DIAGNOSIS — E11.42 DIABETIC PERIPHERAL NEUROPATHY (HCC): ICD-10-CM

## 2019-02-22 RX ORDER — BLOOD-GLUCOSE METER
EACH MISCELLANEOUS 2 TIMES DAILY
Qty: 1 EACH | Refills: 3 | Status: SHIPPED | OUTPATIENT
Start: 2019-02-22 | End: 2021-05-11 | Stop reason: ALTCHOICE

## 2019-02-22 RX ORDER — LANCETS
EACH MISCELLANEOUS
Qty: 100 EACH | Refills: 5 | Status: SHIPPED | OUTPATIENT
Start: 2019-02-22

## 2019-02-22 RX ORDER — GABAPENTIN 100 MG/1
CAPSULE ORAL
Qty: 60 CAPSULE | Refills: 3 | Status: SHIPPED | OUTPATIENT
Start: 2019-02-22 | End: 2019-05-13 | Stop reason: SDUPTHER

## 2019-02-22 NOTE — TELEPHONE ENCOUNTER
Patient needs new Meter and everything goes with it  Lancets & strips  Son said whatever is easier for her to use      [facility] [employer], Patient needs today if possible Please    Best # to  [phone]

## 2019-02-27 ENCOUNTER — TELEPHONE (OUTPATIENT)
Dept: INTERNAL MEDICINE CLINIC | Facility: CLINIC | Age: 79
End: 2019-02-27

## 2019-02-27 DIAGNOSIS — M25.551 PAIN OF RIGHT HIP JOINT: Primary | ICD-10-CM

## 2019-02-27 NOTE — TELEPHONE ENCOUNTER
Patient's son called to see if you would order an xray of Right hip and lower back  Patient is having a lot of pain and issue's with her hip & back  Pateint uses [facility] for the xray's so they asked if you would l=contact them when orders are in  Please advise    Kathy Leung

## 2019-02-28 ENCOUNTER — TELEPHONE (OUTPATIENT)
Dept: INTERNAL MEDICINE CLINIC | Facility: CLINIC | Age: 79
End: 2019-02-28

## 2019-02-28 PROBLEM — M25.551 PAIN OF RIGHT HIP JOINT: Status: ACTIVE | Noted: 2019-02-28

## 2019-02-28 NOTE — TELEPHONE ENCOUNTER
If she's that bad and needs stat xrays and treatment, he should take her to the ER  I can't order them stat

## 2019-02-28 NOTE — TELEPHONE ENCOUNTER
Patient's son asked if you could order the Xray's Stat?     He is taking his mother to get them done today at 1:00

## 2019-03-01 DIAGNOSIS — S22.080A T12 COMPRESSION FRACTURE (HCC): Primary | ICD-10-CM

## 2019-03-04 ENCOUNTER — OFFICE VISIT (OUTPATIENT)
Dept: INTERNAL MEDICINE CLINIC | Facility: CLINIC | Age: 79
End: 2019-03-04
Payer: COMMERCIAL

## 2019-03-04 VITALS
HEIGHT: 59 IN | OXYGEN SATURATION: 94 % | BODY MASS INDEX: 24.39 KG/M2 | DIASTOLIC BLOOD PRESSURE: 62 MMHG | WEIGHT: 121 LBS | HEART RATE: 52 BPM | RESPIRATION RATE: 18 BRPM | SYSTOLIC BLOOD PRESSURE: 98 MMHG

## 2019-03-04 DIAGNOSIS — M25.551 PAIN OF RIGHT HIP JOINT: ICD-10-CM

## 2019-03-04 DIAGNOSIS — E11.649 UNCONTROLLED TYPE 2 DIABETES MELLITUS WITH HYPOGLYCEMIA WITHOUT COMA (HCC): Primary | ICD-10-CM

## 2019-03-04 PROCEDURE — 99214 OFFICE O/P EST MOD 30 MIN: CPT | Performed by: INTERNAL MEDICINE

## 2019-03-04 NOTE — PROGRESS NOTES
Assessment/Plan:     Her son is going to call [phone] who she has gone to before  This is either arthritis or bursitis  Today, her son and I were able to finally convince her to go on insulin  She reports that the visiting nurses will be there on Wednesday  Will start her with 10 units daily  She continues her metformin  Instructed her to keep a food log for the next 2 weeks  Call her sugars either Friday or Monday and we will give further instructions to her son based on that  Once this is settled, she will need a bone density test because of the newly found compression fracture  BMI Counseling: Body mass index is 24 44 kg/m²  Discussed the patient's BMI with her  The BMI is in the acceptable range  Return in about 2 weeks (around 3/18/2019)  No problem-specific Assessment & Plan notes found for this encounter  Diagnoses and all orders for this visit:    Uncontrolled type 2 diabetes mellitus with hypoglycemia without coma (HCC)  -     insulin detemir (LEVEMIR FLEXTOUCH) 100 Units/mL injection pen; Inject 10 Units under the skin daily  -     Insulin Pen Needle (BD PEN NEEDLE ARNOLDO U/F) 32G X 4 MM MISC; by Does not apply route daily    Pain of right hip joint          Subjective:      Patient ID: Ayla Woody is a 77 y o  female  Patient comes in today with her son for follow-up of the x-rays  Her pain is located in the right hip  Nothing in her back  Nothing down the leg  Her x-ray show arthritis in the hip and spine but also noted an old T12 compression fracture, new since September  Her son remembers her falling in the nursing home so that is probably the cause of that  She has seen [facility] in the past   Sugars are also still high  We increased the metformin but her son and I were working on her to go on insulin        ALLERGIES:  Allergies   Allergen Reactions    Calcipotriene Hives    Lactose GI Intolerance    Penicillin V Hives     Category: Allergy;     Propoxyphene Rash       CURRENT MEDICATIONS:    Current Outpatient Medications:     amLODIPine (NORVASC) 5 mg tablet, Take 1 tablet (5 mg total) by mouth daily, Disp: 30 tablet, Rfl: 3    Blood Glucose Monitoring Suppl (ONE TOUCH ULTRA 2) w/Device KIT, by Does not apply route 2 (two) times a day, Disp: 1 each, Rfl: 3    clopidogrel (PLAVIX) 75 mg tablet, Take 1 tablet (75 mg total) by mouth daily, Disp: 30 tablet, Rfl: 3    Continuous Blood Gluc  (FREESTYLE RIAZ READER) MAURY, 1 Units by Does not apply route 4 (four) times a day, Disp: 1 Device, Rfl: 0    Continuous Blood Gluc Sensor (FREESTYLE RIAZ SENSOR SYSTEM) MISC, Check sugars 4 times a day or as needed  , Disp: 3 each, Rfl: 0    DULoxetine (CYMBALTA) 60 mg delayed release capsule, Take 1 capsule (60 mg total) by mouth 2 (two) times a day, Disp: 60 capsule, Rfl: 3    ferrous sulfate 325 (65 Fe) mg tablet, Take 325 mg by mouth, Disp: , Rfl:     furosemide (LASIX) 40 mg tablet, Take 1 tablet (40 mg total) by mouth daily, Disp: 30 tablet, Rfl: 3    gabapentin (NEURONTIN) 100 mg capsule, take 1 capsule by mouth three times a day, Disp: 60 capsule, Rfl: 3    glucose blood (ONE TOUCH ULTRA TEST) test strip, Check sugar twice a day, Disp: 100 each, Rfl: 5    Lancets (ONETOUCH ULTRASOFT) lancets, Check sugar twice a day, Disp: 100 each, Rfl: 5    lisinopril (ZESTRIL) 20 mg tablet, Take 2 tablets (40 mg total) by mouth daily, Disp: 60 tablet, Rfl: 3    metFORMIN (GLUCOPHAGE) 1000 MG tablet, Take 1 tablet (1,000 mg total) by mouth 2 (two) times a day with meals, Disp: 60 tablet, Rfl: 3    metoprolol tartrate (LOPRESSOR) 25 mg tablet, Take 25 mg by mouth 2 (two) times a day, Disp: , Rfl:     nicotine (NICODERM CQ) 14 mg/24hr TD 24 hr patch, Place 1 patch on the skin every 24 hours, Disp: , Rfl:     nitroglycerin (NITROSTAT) 0 4 mg SL tablet, 1 tab(s), Disp: , Rfl:     simvastatin (ZOCOR) 40 mg tablet, Take 1 tablet (40 mg total) by mouth daily, Disp: 30 tablet, Rfl: 3    solifenacin (VESICARE) 5 mg tablet, Take 1 tablet (5 mg total) by mouth daily, Disp: 30 tablet, Rfl: 3    insulin detemir (LEVEMIR FLEXTOUCH) 100 Units/mL injection pen, Inject 10 Units under the skin daily, Disp: 5 pen, Rfl: 5    Insulin Pen Needle (BD PEN NEEDLE ARNOLDO U/F) 32G X 4 MM MISC, by Does not apply route daily, Disp: 100 each, Rfl: 5    ACTIVE PROBLEM LIST:  Patient Active Problem List   Diagnosis    Allergic rhinitis    Atherosclerosis of native artery of both lower extremities with intermittent claudication (HCC)    Carotid stenosis, bilateral    Carpal tunnel syndrome on both sides    Depression    Diabetes mellitus type 2, uncontrolled ([address] )    Diabetic peripheral neuropathy ([facility] [address] )    Hyperlipidemia    Essential hypertension    Mesenteric artery stenosis (HCC)    Seborrheic keratosis    Skin lesion    Vitamin D deficiency    Cerebral infarction (HCC)    Peripheral neuropathy    Trigger little finger of right hand    Urinary incontinence in female    Vertigo    Degenerative joint disease (DJD) of lumbar spine    Pain of right hip joint    T12 compression fracture (HCC)       PAST MEDICAL HISTORY:  Past Medical History:   Diagnosis Date    Colonic polyp     Diverticulosis     large intestine without hemmorhage       PAST SURGICAL HISTORY:  Past Surgical History:   Procedure Laterality Date    BREAST SURGERY      CAROTID ENDARTERECTOMY      CORONARY ANGIOPLASTY WITH STENT PLACEMENT      leg and chest    CORONARY ANGIOPLASTY WITH STENT PLACEMENT      adominal stent    CORONARY ARTERY BYPASS GRAFT      triple artery    TONSILLECTOMY         FAMILY HISTORY:  Family History   Problem Relation Age of Onset    Lung cancer Mother     Migraines Mother     Stroke Brother        SOCIAL HISTORY:  Social History     Socioeconomic History    Marital status:       Spouse name: Not on file    Number of children: Not on file    Years of education: Not on file    Highest education level: Not on file   Occupational History     Comment: retired   Social Needs    Financial resource strain: Not on file    Food insecurity:     Worry: Not on file     Inability: Not on file   [email or web address] needs:     Medical: Not on file     Non-medical: Not on file   Tobacco Use    Smoking status: Former Smoker    Smokeless tobacco: Never Used   Substance and Sexual Activity    Alcohol use: Yes    Drug use: No    Sexual activity: Never     Partners: Male   Lifestyle    Physical activity:     Days per week: Not on file     Minutes per session: Not on file    Stress: Not on file   Relationships    Social connections:     Talks on phone: Not on file     Gets together: Not on file     Attends Sabianism service: Not on file     Active member of club or organization: Not on file     Attends meetings of clubs or organizations: Not on file     Relationship status: Not on file    Intimate partner violence:     Fear of current or ex partner: Not on file     Emotionally abused: Not on file     Physically abused: Not on file     Forced sexual activity: Not on file   Other Topics Concern    Not on file   Social History Narrative    Not on file       Review of Systems   Respiratory: Negative for shortness of breath  Cardiovascular: Negative for chest pain  Gastrointestinal: Negative for abdominal pain  Objective:  Vitals:    03/04/19 1312   BP: 98/62   Pulse: (!) 52   Resp: 18   SpO2: 94%   Weight: 54 9 kg (121 lb)   Height: 4' 11" (1 499 m)     Body mass index is 24 44 kg/m²  Physical Exam   Musculoskeletal:   No point tenderness in the right hip  She had difficulty getting up from the chair  Full rotation of the hip  No point tenderness in the back, specifically at T12          RESULTS:    No results found for this or any previous visit (from the past 1008 hour(s))  This note was created with voice recognition software    Phonic, grammatical and spelling errors may be present within the note as a result

## 2019-03-20 ENCOUNTER — OFFICE VISIT (OUTPATIENT)
Dept: INTERNAL MEDICINE CLINIC | Facility: CLINIC | Age: 79
End: 2019-03-20
Payer: COMMERCIAL

## 2019-03-20 VITALS
SYSTOLIC BLOOD PRESSURE: 120 MMHG | DIASTOLIC BLOOD PRESSURE: 76 MMHG | HEIGHT: 59 IN | HEART RATE: 64 BPM | WEIGHT: 122 LBS | BODY MASS INDEX: 24.6 KG/M2 | OXYGEN SATURATION: 100 % | RESPIRATION RATE: 18 BRPM

## 2019-03-20 DIAGNOSIS — E11.649 UNCONTROLLED TYPE 2 DIABETES MELLITUS WITH HYPOGLYCEMIA WITHOUT COMA (HCC): Primary | ICD-10-CM

## 2019-03-20 DIAGNOSIS — M25.551 PAIN OF RIGHT HIP JOINT: ICD-10-CM

## 2019-03-20 DIAGNOSIS — I10 ESSENTIAL HYPERTENSION: ICD-10-CM

## 2019-03-20 PROCEDURE — 1160F RVW MEDS BY RX/DR IN RCRD: CPT | Performed by: INTERNAL MEDICINE

## 2019-03-20 PROCEDURE — 99214 OFFICE O/P EST MOD 30 MIN: CPT | Performed by: INTERNAL MEDICINE

## 2019-03-20 PROCEDURE — 3074F SYST BP LT 130 MM HG: CPT | Performed by: INTERNAL MEDICINE

## 2019-03-20 PROCEDURE — 1036F TOBACCO NON-USER: CPT | Performed by: INTERNAL MEDICINE

## 2019-03-20 PROCEDURE — 3078F DIAST BP <80 MM HG: CPT | Performed by: INTERNAL MEDICINE

## 2019-03-20 NOTE — PROGRESS NOTES
Assessment/Plan:     Blood pressure came down upon repeat  Adjusted her insulin dose  Instructed her or her son to call next week with her sugars so we can make further adjustments  Return in about 6 weeks (around 5/1/2019)  No problem-specific Assessment & Plan notes found for this encounter  Diagnoses and all orders for this visit:    Uncontrolled type 2 diabetes mellitus with hypoglycemia without coma (HCC)  -     insulin detemir (LEVEMIR FLEXTOUCH) 100 Units/mL injection pen; Inject 15 Units under the skin daily    Essential hypertension    Pain of right hip joint          Subjective:      Patient ID: Vanessa Chan is a 77 y o  female  Patient comes in today for follow-up  Her hip is doing much better  She had bursitis and the cortisone shot from the orthopedic doctor took care of that  Her blood pressure was initially high but came down upon repeat  She is taking the medicine as directed  She reports she is doing very well with the insulin  No trouble with the injections  Sugars are coming down  Still getting some above 200  But no low sugars yet  ALLERGIES:  Allergies   Allergen Reactions    Calcipotriene Hives    Lactose GI Intolerance    Penicillin V Hives     Category: Allergy;     Propoxyphene Rash       CURRENT MEDICATIONS:    Current Outpatient Medications:     amLODIPine (NORVASC) 5 mg tablet, Take 1 tablet (5 mg total) by mouth daily, Disp: 30 tablet, Rfl: 3    Blood Glucose Monitoring Suppl (ONE TOUCH ULTRA 2) w/Device KIT, by Does not apply route 2 (two) times a day, Disp: 1 each, Rfl: 3    clopidogrel (PLAVIX) 75 mg tablet, Take 1 tablet (75 mg total) by mouth daily, Disp: 30 tablet, Rfl: 3    Continuous Blood Gluc  (FREESTYLE RIAZ READER) MAURY, 1 Units by Does not apply route 4 (four) times a day, Disp: 1 Device, Rfl: 0    Continuous Blood Gluc Sensor (FREESTYLE RIAZ SENSOR SYSTEM) MISC, Check sugars 4 times a day or as needed  , Disp: 3 each, Rfl: 0    DULoxetine (CYMBALTA) 60 mg delayed release capsule, Take 1 capsule (60 mg total) by mouth 2 (two) times a day, Disp: 60 capsule, Rfl: 3    ferrous sulfate 325 (65 Fe) mg tablet, Take 325 mg by mouth, Disp: , Rfl:     furosemide (LASIX) 40 mg tablet, Take 1 tablet (40 mg total) by mouth daily, Disp: 30 tablet, Rfl: 3    gabapentin (NEURONTIN) 100 mg capsule, take 1 capsule by mouth three times a day, Disp: 60 capsule, Rfl: 3    glucose blood (ONE TOUCH ULTRA TEST) test strip, Check sugar twice a day, Disp: 100 each, Rfl: 5    insulin detemir (LEVEMIR FLEXTOUCH) 100 Units/mL injection pen, Inject 15 Units under the skin daily, Disp: 5 pen, Rfl: 5    Insulin Pen Needle (BD PEN NEEDLE ARNOLDO U/F) 32G X 4 MM MISC, by Does not apply route daily, Disp: 100 each, Rfl: 5    Lancets (ONETOUCH ULTRASOFT) lancets, Check sugar twice a day, Disp: 100 each, Rfl: 5    lisinopril (ZESTRIL) 20 mg tablet, Take 2 tablets (40 mg total) by mouth daily, Disp: 60 tablet, Rfl: 3    metFORMIN (GLUCOPHAGE) 1000 MG tablet, Take 1 tablet (1,000 mg total) by mouth 2 (two) times a day with meals, Disp: 60 tablet, Rfl: 3    metoprolol tartrate (LOPRESSOR) 25 mg tablet, Take 25 mg by mouth 2 (two) times a day, Disp: , Rfl:     nicotine (NICODERM CQ) 14 mg/24hr TD 24 hr patch, Place 1 patch on the skin every 24 hours, Disp: , Rfl:     nitroglycerin (NITROSTAT) 0 4 mg SL tablet, 1 tab(s), Disp: , Rfl:     simvastatin (ZOCOR) 40 mg tablet, Take 1 tablet (40 mg total) by mouth daily, Disp: 30 tablet, Rfl: 3    solifenacin (VESICARE) 5 mg tablet, Take 1 tablet (5 mg total) by mouth daily, Disp: 30 tablet, Rfl: 3    ACTIVE PROBLEM LIST:  Patient Active Problem List   Diagnosis    Allergic rhinitis    Atherosclerosis of native artery of both lower extremities with intermittent claudication (HCC)    Carotid stenosis, bilateral    Carpal tunnel syndrome on both sides    Depression    Diabetes mellitus type 2, uncontrolled (HCC)  Diabetic peripheral neuropathy (HCC)    Hyperlipidemia    Essential hypertension    Mesenteric artery stenosis (HCC)    Seborrheic keratosis    Skin lesion    Vitamin D deficiency    Cerebral infarction (HCC)    Peripheral neuropathy    Trigger little finger of right hand    Urinary incontinence in female    Vertigo    Degenerative joint disease (DJD) of lumbar spine    Pain of right hip joint    T12 compression fracture (HCC)       PAST MEDICAL HISTORY:  Past Medical History:   Diagnosis Date    Colonic polyp     Diverticulosis     large intestine without hemmorhage       PAST SURGICAL HISTORY:  Past Surgical History:   Procedure Laterality Date    BREAST SURGERY      CAROTID ENDARTERECTOMY      CORONARY ANGIOPLASTY WITH STENT PLACEMENT      leg and chest    CORONARY ANGIOPLASTY WITH STENT PLACEMENT      adominal stent    CORONARY ARTERY BYPASS GRAFT      triple artery    TONSILLECTOMY         FAMILY HISTORY:  Family History   Problem Relation Age of Onset    Lung cancer Mother     Migraines Mother     Stroke Brother        SOCIAL HISTORY:  Social History     Socioeconomic History    Marital status:      Spouse name: Not on file    Number of children: Not on file    Years of education: Not on file    Highest education level: Not on file   Occupational History     Comment: retired   Social Needs    Financial resource strain: Not on file    Food insecurity:     Worry: Not on file     Inability: Not on file   Prezi needs:     Medical: Not on file     Non-medical: Not on file   Tobacco Use    Smoking status: Former Smoker    Smokeless tobacco: Never Used   Substance and Sexual Activity    Alcohol use:  Yes    Drug use: No    Sexual activity: Never     Partners: Male   Lifestyle    Physical activity:     Days per week: Not on file     Minutes per session: Not on file    Stress: Not on file   Relationships    Social connections:     Talks on phone: Not on file Gets together: Not on file     Attends Anabaptist service: Not on file     Active member of club or organization: Not on file     Attends meetings of clubs or organizations: Not on file     Relationship status: Not on file    Intimate partner violence:     Fear of current or ex partner: Not on file     Emotionally abused: Not on file     Physically abused: Not on file     Forced sexual activity: Not on file   Other Topics Concern    Not on file   Social History Narrative    Not on file       Review of Systems   Respiratory: Negative for shortness of breath  Cardiovascular: Negative for chest pain  Gastrointestinal: Negative for abdominal pain  Objective:  Vitals:    03/20/19 1336 03/20/19 1403   BP: 142/62 120/76   BP Location: Left arm    Patient Position: Sitting    Cuff Size: Adult    Pulse: 64    Resp: 18    SpO2: 100%    Weight: 55 3 kg (122 lb)    Height: 4' 11" (1 499 m)      Body mass index is 24 64 kg/m²  Physical Exam   Constitutional: She is oriented to person, place, and time  She appears well-developed and well-nourished  Cardiovascular: Normal rate, regular rhythm and normal heart sounds  Pulmonary/Chest: Effort normal and breath sounds normal    Abdominal: Soft  There is no tenderness  Neurological: She is alert and oriented to person, place, and time  Nursing note and vitals reviewed  RESULTS:    No results found for this or any previous visit (from the past 1008 hour(s))  This note was created with voice recognition software  Phonic, grammatical and spelling errors may be present within the note as a result

## 2019-03-21 DIAGNOSIS — E78.2 MIXED HYPERLIPIDEMIA: ICD-10-CM

## 2019-03-21 DIAGNOSIS — E11.649 UNCONTROLLED TYPE 2 DIABETES MELLITUS WITH HYPOGLYCEMIA WITHOUT COMA (HCC): ICD-10-CM

## 2019-03-21 RX ORDER — SIMVASTATIN 40 MG
40 TABLET ORAL DAILY
Qty: 30 TABLET | Refills: 3 | Status: SHIPPED | OUTPATIENT
Start: 2019-03-21 | End: 2019-09-15 | Stop reason: SDUPTHER

## 2019-03-21 RX ORDER — LISINOPRIL 20 MG/1
40 TABLET ORAL DAILY
Qty: 90 TABLET | Refills: 1 | Status: SHIPPED | OUTPATIENT
Start: 2019-03-21 | End: 2019-08-28 | Stop reason: SDUPTHER

## 2019-03-28 DIAGNOSIS — E11.649 UNCONTROLLED TYPE 2 DIABETES MELLITUS WITH HYPOGLYCEMIA WITHOUT COMA (HCC): ICD-10-CM

## 2019-04-03 LAB
LEFT EYE DIABETIC RETINOPATHY: NORMAL
RIGHT EYE DIABETIC RETINOPATHY: NORMAL

## 2019-04-19 DIAGNOSIS — F32.A ANXIETY AND DEPRESSION: ICD-10-CM

## 2019-04-19 DIAGNOSIS — I10 ESSENTIAL HYPERTENSION: ICD-10-CM

## 2019-04-19 DIAGNOSIS — F41.9 ANXIETY AND DEPRESSION: ICD-10-CM

## 2019-04-19 DIAGNOSIS — I73.9 PAD (PERIPHERAL ARTERY DISEASE) (HCC): ICD-10-CM

## 2019-04-19 RX ORDER — DULOXETIN HYDROCHLORIDE 60 MG/1
CAPSULE, DELAYED RELEASE ORAL
Qty: 60 CAPSULE | Refills: 3 | Status: SHIPPED | OUTPATIENT
Start: 2019-04-19 | End: 2019-08-03 | Stop reason: SDUPTHER

## 2019-04-19 RX ORDER — AMLODIPINE BESYLATE 5 MG/1
TABLET ORAL
Qty: 30 TABLET | Refills: 3 | Status: SHIPPED | OUTPATIENT
Start: 2019-04-19 | End: 2019-08-28 | Stop reason: SDUPTHER

## 2019-04-19 RX ORDER — CLOPIDOGREL BISULFATE 75 MG/1
TABLET ORAL
Qty: 30 TABLET | Refills: 3 | Status: SHIPPED | OUTPATIENT
Start: 2019-04-19 | End: 2019-08-16 | Stop reason: SDUPTHER

## 2019-04-23 DIAGNOSIS — I10 ESSENTIAL HYPERTENSION: ICD-10-CM

## 2019-04-23 RX ORDER — FUROSEMIDE 40 MG/1
TABLET ORAL
Qty: 30 TABLET | Refills: 5 | Status: SHIPPED | OUTPATIENT
Start: 2019-04-23 | End: 2020-03-05 | Stop reason: SDUPTHER

## 2019-04-26 ENCOUNTER — DOCUMENTATION (OUTPATIENT)
Dept: INTERNAL MEDICINE CLINIC | Facility: CLINIC | Age: 79
End: 2019-04-26

## 2019-04-26 DIAGNOSIS — R32 URINARY INCONTINENCE IN FEMALE: ICD-10-CM

## 2019-04-26 RX ORDER — SOLIFENACIN SUCCINATE 5 MG/1
TABLET, FILM COATED ORAL
Qty: 30 TABLET | Refills: 3 | Status: SHIPPED | OUTPATIENT
Start: 2019-04-26 | End: 2019-08-16 | Stop reason: SDUPTHER

## 2019-05-01 ENCOUNTER — OFFICE VISIT (OUTPATIENT)
Dept: INTERNAL MEDICINE CLINIC | Facility: CLINIC | Age: 79
End: 2019-05-01
Payer: COMMERCIAL

## 2019-05-01 VITALS
HEIGHT: 59 IN | HEART RATE: 88 BPM | RESPIRATION RATE: 20 BRPM | SYSTOLIC BLOOD PRESSURE: 108 MMHG | DIASTOLIC BLOOD PRESSURE: 58 MMHG | BODY MASS INDEX: 25 KG/M2 | WEIGHT: 124 LBS

## 2019-05-01 DIAGNOSIS — I10 ESSENTIAL HYPERTENSION: ICD-10-CM

## 2019-05-01 DIAGNOSIS — I70.213 ATHEROSCLEROSIS OF NATIVE ARTERY OF BOTH LOWER EXTREMITIES WITH INTERMITTENT CLAUDICATION (HCC): ICD-10-CM

## 2019-05-01 DIAGNOSIS — E11.649 UNCONTROLLED TYPE 2 DIABETES MELLITUS WITH HYPOGLYCEMIA WITHOUT COMA (HCC): Primary | ICD-10-CM

## 2019-05-01 PROBLEM — S22.080A T12 COMPRESSION FRACTURE (HCC): Status: RESOLVED | Noted: 2019-03-01 | Resolved: 2019-05-01

## 2019-05-01 PROCEDURE — 1036F TOBACCO NON-USER: CPT | Performed by: INTERNAL MEDICINE

## 2019-05-01 PROCEDURE — 99214 OFFICE O/P EST MOD 30 MIN: CPT | Performed by: INTERNAL MEDICINE

## 2019-05-01 PROCEDURE — 3078F DIAST BP <80 MM HG: CPT | Performed by: INTERNAL MEDICINE

## 2019-05-01 PROCEDURE — 1160F RVW MEDS BY RX/DR IN RCRD: CPT | Performed by: INTERNAL MEDICINE

## 2019-05-01 PROCEDURE — 3074F SYST BP LT 130 MM HG: CPT | Performed by: INTERNAL MEDICINE

## 2019-05-02 DIAGNOSIS — IMO0001 UNCONTROLLED TYPE 2 DIABETES MELLITUS WITHOUT COMPLICATION, WITHOUT LONG-TERM CURRENT USE OF INSULIN: ICD-10-CM

## 2019-05-13 DIAGNOSIS — E11.42 DIABETIC PERIPHERAL NEUROPATHY (HCC): ICD-10-CM

## 2019-05-13 RX ORDER — GABAPENTIN 100 MG/1
CAPSULE ORAL
Qty: 60 CAPSULE | Refills: 3 | Status: SHIPPED | OUTPATIENT
Start: 2019-05-13 | End: 2019-09-26 | Stop reason: SDUPTHER

## 2019-08-03 DIAGNOSIS — F32.A ANXIETY AND DEPRESSION: ICD-10-CM

## 2019-08-03 DIAGNOSIS — F41.9 ANXIETY AND DEPRESSION: ICD-10-CM

## 2019-08-04 RX ORDER — DULOXETIN HYDROCHLORIDE 60 MG/1
CAPSULE, DELAYED RELEASE ORAL
Qty: 180 CAPSULE | Refills: 0 | Status: SHIPPED | OUTPATIENT
Start: 2019-08-04 | End: 2019-10-29 | Stop reason: SDUPTHER

## 2019-08-16 DIAGNOSIS — I73.9 PAD (PERIPHERAL ARTERY DISEASE) (HCC): ICD-10-CM

## 2019-08-16 DIAGNOSIS — R32 URINARY INCONTINENCE IN FEMALE: ICD-10-CM

## 2019-08-16 RX ORDER — CLOPIDOGREL BISULFATE 75 MG/1
TABLET ORAL
Qty: 30 TABLET | Refills: 3 | Status: SHIPPED | OUTPATIENT
Start: 2019-08-16 | End: 2019-12-14 | Stop reason: SDUPTHER

## 2019-08-16 RX ORDER — SOLIFENACIN SUCCINATE 5 MG/1
TABLET, FILM COATED ORAL
Qty: 30 TABLET | Refills: 3 | Status: SHIPPED | OUTPATIENT
Start: 2019-08-16 | End: 2019-12-14 | Stop reason: SDUPTHER

## 2019-08-28 DIAGNOSIS — I10 ESSENTIAL HYPERTENSION: ICD-10-CM

## 2019-08-28 DIAGNOSIS — E11.649 UNCONTROLLED TYPE 2 DIABETES MELLITUS WITH HYPOGLYCEMIA WITHOUT COMA (HCC): ICD-10-CM

## 2019-08-28 RX ORDER — LISINOPRIL 20 MG/1
40 TABLET ORAL DAILY
Qty: 180 TABLET | Refills: 0 | Status: SHIPPED | OUTPATIENT
Start: 2019-08-28 | End: 2019-11-26 | Stop reason: SDUPTHER

## 2019-08-28 RX ORDER — AMLODIPINE BESYLATE 5 MG/1
TABLET ORAL
Qty: 90 TABLET | Refills: 0 | Status: SHIPPED | OUTPATIENT
Start: 2019-08-28 | End: 2021-05-11 | Stop reason: ALTCHOICE

## 2019-09-15 DIAGNOSIS — E78.2 MIXED HYPERLIPIDEMIA: ICD-10-CM

## 2019-09-16 RX ORDER — SIMVASTATIN 40 MG
TABLET ORAL
Qty: 30 TABLET | Refills: 0 | Status: SHIPPED | OUTPATIENT
Start: 2019-09-16 | End: 2019-11-01 | Stop reason: SDUPTHER

## 2019-09-26 DIAGNOSIS — E11.42 DIABETIC PERIPHERAL NEUROPATHY (HCC): ICD-10-CM

## 2019-09-26 RX ORDER — GABAPENTIN 100 MG/1
CAPSULE ORAL
Qty: 60 CAPSULE | Refills: 0 | Status: SHIPPED | OUTPATIENT
Start: 2019-09-26 | End: 2019-11-26 | Stop reason: SDUPTHER

## 2019-10-15 DIAGNOSIS — IMO0001 UNCONTROLLED TYPE 2 DIABETES MELLITUS WITHOUT COMPLICATION, WITHOUT LONG-TERM CURRENT USE OF INSULIN: ICD-10-CM

## 2019-10-16 DIAGNOSIS — I10 ESSENTIAL HYPERTENSION: ICD-10-CM

## 2019-10-16 RX ORDER — FUROSEMIDE 40 MG/1
TABLET ORAL
Qty: 30 TABLET | Refills: 5 | OUTPATIENT
Start: 2019-10-16

## 2019-10-25 DIAGNOSIS — E78.2 MIXED HYPERLIPIDEMIA: ICD-10-CM

## 2019-10-25 RX ORDER — SIMVASTATIN 40 MG
TABLET ORAL
Qty: 30 TABLET | Refills: 0 | OUTPATIENT
Start: 2019-10-25

## 2019-10-29 DIAGNOSIS — F41.9 ANXIETY AND DEPRESSION: ICD-10-CM

## 2019-10-29 DIAGNOSIS — E78.2 MIXED HYPERLIPIDEMIA: ICD-10-CM

## 2019-10-29 DIAGNOSIS — F32.A ANXIETY AND DEPRESSION: ICD-10-CM

## 2019-10-29 RX ORDER — SIMVASTATIN 40 MG
TABLET ORAL
Qty: 30 TABLET | Refills: 0 | OUTPATIENT
Start: 2019-10-29

## 2019-10-29 RX ORDER — DULOXETIN HYDROCHLORIDE 60 MG/1
CAPSULE, DELAYED RELEASE ORAL
Qty: 180 CAPSULE | Refills: 0 | Status: SHIPPED | OUTPATIENT
Start: 2019-10-29 | End: 2020-01-20

## 2019-11-01 ENCOUNTER — TELEPHONE (OUTPATIENT)
Dept: INTERNAL MEDICINE CLINIC | Facility: CLINIC | Age: 79
End: 2019-11-01

## 2019-11-01 DIAGNOSIS — E78.2 MIXED HYPERLIPIDEMIA: ICD-10-CM

## 2019-11-01 RX ORDER — SIMVASTATIN 40 MG
40 TABLET ORAL DAILY
Qty: 30 TABLET | Refills: 0 | Status: SHIPPED | OUTPATIENT
Start: 2019-11-01 | End: 2019-11-26 | Stop reason: SDUPTHER

## 2019-11-08 ENCOUNTER — OFFICE VISIT (OUTPATIENT)
Dept: INTERNAL MEDICINE CLINIC | Facility: CLINIC | Age: 79
End: 2019-11-08
Payer: COMMERCIAL

## 2019-11-08 VITALS
HEART RATE: 58 BPM | OXYGEN SATURATION: 96 % | HEIGHT: 59 IN | SYSTOLIC BLOOD PRESSURE: 122 MMHG | DIASTOLIC BLOOD PRESSURE: 60 MMHG | BODY MASS INDEX: 25 KG/M2 | RESPIRATION RATE: 18 BRPM | WEIGHT: 124 LBS

## 2019-11-08 DIAGNOSIS — I10 ESSENTIAL HYPERTENSION: ICD-10-CM

## 2019-11-08 DIAGNOSIS — E11.649 UNCONTROLLED TYPE 2 DIABETES MELLITUS WITH HYPOGLYCEMIA WITHOUT COMA (HCC): Primary | ICD-10-CM

## 2019-11-08 DIAGNOSIS — I70.213 ATHEROSCLEROSIS OF NATIVE ARTERY OF BOTH LOWER EXTREMITIES WITH INTERMITTENT CLAUDICATION (HCC): ICD-10-CM

## 2019-11-08 LAB — SL AMB POCT HEMOGLOBIN AIC: 7.7 (ref ?–6.5)

## 2019-11-08 PROCEDURE — 1101F PT FALLS ASSESS-DOCD LE1/YR: CPT | Performed by: INTERNAL MEDICINE

## 2019-11-08 PROCEDURE — 1160F RVW MEDS BY RX/DR IN RCRD: CPT | Performed by: INTERNAL MEDICINE

## 2019-11-08 PROCEDURE — 1036F TOBACCO NON-USER: CPT | Performed by: INTERNAL MEDICINE

## 2019-11-08 PROCEDURE — 83036 HEMOGLOBIN GLYCOSYLATED A1C: CPT | Performed by: INTERNAL MEDICINE

## 2019-11-08 PROCEDURE — 99214 OFFICE O/P EST MOD 30 MIN: CPT | Performed by: INTERNAL MEDICINE

## 2019-11-08 NOTE — PROGRESS NOTES
Assessment/Plan: For what she states her medical problems are stable  Not sure if she is just describing vivid dreams  Told her to check her sugars before bedtime to make sure she is not getting any low sugars  Also move her insulin timing by 1 hour to see if that makes a difference in the sugars  Ordered labs  Quality Measures:       No follow-ups on file  No problem-specific Assessment & Plan notes found for this encounter  Diagnoses and all orders for this visit:    Uncontrolled type 2 diabetes mellitus with hypoglycemia without coma ([facility] [address] )  -     POCT hemoglobin A1c          Subjective:      Patient ID: Cain Kern is a [de-identified] y o  female  Patient comes in today after not being here in a while  She missed her last appointment over the summer  She states her medical problems have been doing well  She states she is following with her cardiologist and saw him recently  Everything is stable in that regards  Her feet are doing okay she states  Vascular problems are stable  Her sugars are doing okay  An A1c was 7 7  But she states she is having strange dreams and is remembering things that she thinks occurred when she was intubated last year and sick for all that time  Her son has told her that certain thing she remembers did not happen  She states she is fine during the day  She does not feel sick  ALLERGIES:  Allergies   Allergen Reactions    Calcipotriene Hives    Lactose GI Intolerance    Penicillin V Hives     Category:  Allergy;     Propoxyphene Rash       CURRENT MEDICATIONS:    Current Outpatient Medications:     Blood Glucose Monitoring Suppl (ONE TOUCH ULTRA 2) w/Device KIT, by Does not apply route 2 (two) times a day, Disp: 1 each, Rfl: 3    clopidogrel (PLAVIX) 75 mg tablet, take 1 tablet by mouth once daily, Disp: 30 tablet, Rfl: 3    Continuous Blood Gluc  (FREESTYLE RIAZ READER) MAURY, 1 Units by Does not apply route 4 (four) times a day, Disp: 1 Device, Rfl: 0    Continuous Blood Gluc Sensor (FREESTYLE RIAZ SENSOR SYSTEM) MISC, Check sugars 4 times a day or as needed  , Disp: 3 each, Rfl: 0    DULoxetine (CYMBALTA) 60 mg delayed release capsule, take 1 capsule by mouth twice a day, Disp: 180 capsule, Rfl: 0    ferrous sulfate 325 (65 Fe) mg tablet, Take 325 mg by mouth, Disp: , Rfl:     furosemide (LASIX) 40 mg tablet, take 1 tablet by mouth once daily, Disp: 30 tablet, Rfl: 5    gabapentin (NEURONTIN) 100 mg capsule, take 1 capsule by mouth three times a day, Disp: 60 capsule, Rfl: 0    glucose blood (ONE TOUCH ULTRA TEST) test strip, Check sugar twice a day, Disp: 100 each, Rfl: 5    insulin detemir (LEVEMIR FLEXTOUCH) 100 Units/mL injection pen, Inject 15 Units under the skin daily, Disp: 5 pen, Rfl: 5    Insulin Pen Needle (BD PEN NEEDLE ARNOLDO U/F) 32G X 4 MM MISC, by Does not apply route daily, Disp: 100 each, Rfl: 5    Lancets (ONETOUCH ULTRASOFT) lancets, Check sugar twice a day, Disp: 100 each, Rfl: 5    lisinopril (ZESTRIL) 20 mg tablet, take 2 tablets (40 MG TOTAL) by mouth daily, Disp: 180 tablet, Rfl: 0    metFORMIN (GLUCOPHAGE) 1000 MG tablet, Take 1 tablet (1,000 mg total) by mouth 2 (two) times a day with meals, Disp: 120 tablet, Rfl: 1    simvastatin (ZOCOR) 40 mg tablet, Take 1 tablet (40 mg total) by mouth daily, Disp: 30 tablet, Rfl: 0    VESICARE 5 MG tablet, take 1 tablet by mouth once daily, Disp: 30 tablet, Rfl: 3    amLODIPine (NORVASC) 5 mg tablet, take 1 tablet by mouth once daily (Patient not taking: Reported on 11/8/2019), Disp: 90 tablet, Rfl: 0    metoprolol tartrate (LOPRESSOR) 25 mg tablet, Take 25 mg by mouth 2 (two) times a day, Disp: , Rfl:     nicotine (NICODERM CQ) 14 mg/24hr TD 24 hr patch, Place 1 patch on the skin every 24 hours, Disp: , Rfl:     nitroglycerin (NITROSTAT) 0 4 mg SL tablet, 1 tab(s), Disp: , Rfl:     ACTIVE PROBLEM LIST:  Patient Active Problem List   Diagnosis    Allergic rhinitis    Atherosclerosis of native artery of both lower extremities with intermittent claudication ([facility])    Carotid stenosis, bilateral    Carpal tunnel syndrome on both sides    Depression    Diabetes mellitus type 2, uncontrolled ([facility] [address] )    Diabetic peripheral neuropathy ([facility] [address] )    Hyperlipidemia    Essential hypertension    Mesenteric artery stenosis (HCC)    Seborrheic keratosis    Skin lesion    Vitamin D deficiency    Cerebral infarction ([facility])    Peripheral neuropathy    Trigger little finger of right hand    Urinary incontinence in female    Vertigo    Degenerative joint disease (DJD) of lumbar spine    Pain of right hip joint       PAST MEDICAL HISTORY:  Past Medical History:   Diagnosis Date    Colonic polyp     Diverticulosis     large intestine without hemmorhage    T12 compression fracture ([facility]) 3/1/2019       PAST SURGICAL HISTORY:  Past Surgical History:   Procedure Laterality Date    BREAST SURGERY      CAROTID ENDARTERECTOMY      CORONARY ANGIOPLASTY WITH STENT PLACEMENT      leg and chest    CORONARY ANGIOPLASTY WITH STENT PLACEMENT      adominal stent    CORONARY ARTERY BYPASS GRAFT      triple artery    TONSILLECTOMY         FAMILY HISTORY:  Family History   Problem Relation Age of Onset    Lung cancer Mother     Migraines Mother     Stroke Brother        SOCIAL HISTORY:  Social History     Socioeconomic History    Marital status:      Spouse name: Not on file    Number of children: Not on file    Years of education: Not on file    Highest education level: Not on file   Occupational History     Comment: retired   Social Needs    Financial resource strain: Not on file    Food insecurity:     Worry: Not on file     Inability: Not on file   Democravise needs:     Medical: Not on file     Non-medical: Not on file   Tobacco Use    Smoking status: Former Smoker    Smokeless tobacco: Never Used   Substance and Sexual Activity    Alcohol use:  Yes    Drug use: No    Sexual activity: Never     Partners: Male   Lifestyle    Physical activity:     Days per week: Not on file     Minutes per session: Not on file    Stress: Not on file   Relationships    Social connections:     Talks on phone: Not on file     Gets together: Not on file     Attends Jain service: Not on file     Active member of club or organization: Not on file     Attends meetings of clubs or organizations: Not on file     Relationship status: Not on file    Intimate partner violence:     Fear of current or ex partner: Not on file     Emotionally abused: Not on file     Physically abused: Not on file     Forced sexual activity: Not on file   Other Topics Concern    Not on file   Social History Narrative    Not on file       Review of Systems   Respiratory: Negative for shortness of breath  Cardiovascular: Negative for chest pain  Gastrointestinal: Negative for abdominal pain  Objective:  Vitals:    11/08/19 1124   BP: 122/60   BP Location: Left arm   Patient Position: Sitting   Cuff Size: Standard   Pulse: 58   Resp: 18   SpO2: 96%   Weight: 56 2 kg (124 lb)   Height: 4' 11" (1 499 m)     Body mass index is 25 04 kg/m²  Physical Exam   Constitutional: She is oriented to person, place, and time  She appears well-developed and well-nourished  Cardiovascular: Normal rate, regular rhythm and normal heart sounds  Pulmonary/Chest: Effort normal and breath sounds normal    Abdominal: Soft  There is no tenderness  Neurological: She is alert and oriented to person, place, and time  Nursing note and vitals reviewed  RESULTS:    Recent Results (from the past 1008 hour(s))   POCT hemoglobin A1c    Collection Time: 11/08/19 12:39 PM   Result Value Ref Range    Hemoglobin A1C 7 7 (A) 6 5       This note was created with voice recognition software  Phonic, grammatical and spelling errors may be present within the note as a result

## 2019-11-26 DIAGNOSIS — E11.42 DIABETIC PERIPHERAL NEUROPATHY (HCC): ICD-10-CM

## 2019-11-26 DIAGNOSIS — E78.2 MIXED HYPERLIPIDEMIA: ICD-10-CM

## 2019-11-26 DIAGNOSIS — E11.649 UNCONTROLLED TYPE 2 DIABETES MELLITUS WITH HYPOGLYCEMIA WITHOUT COMA (HCC): ICD-10-CM

## 2019-11-26 RX ORDER — LISINOPRIL 20 MG/1
TABLET ORAL
Qty: 180 TABLET | Refills: 0 | Status: SHIPPED | OUTPATIENT
Start: 2019-11-26 | End: 2020-02-18

## 2019-11-26 RX ORDER — SIMVASTATIN 40 MG
TABLET ORAL
Qty: 30 TABLET | Refills: 0 | Status: SHIPPED | OUTPATIENT
Start: 2019-11-26 | End: 2019-12-20 | Stop reason: SDUPTHER

## 2019-11-26 RX ORDER — GABAPENTIN 100 MG/1
CAPSULE ORAL
Qty: 90 CAPSULE | Refills: 5 | Status: SHIPPED | OUTPATIENT
Start: 2019-11-26 | End: 2020-07-30

## 2019-12-14 DIAGNOSIS — I73.9 PAD (PERIPHERAL ARTERY DISEASE) (HCC): ICD-10-CM

## 2019-12-14 DIAGNOSIS — R32 URINARY INCONTINENCE IN FEMALE: ICD-10-CM

## 2019-12-16 RX ORDER — CLOPIDOGREL BISULFATE 75 MG/1
TABLET ORAL
Qty: 30 TABLET | Refills: 3 | Status: SHIPPED | OUTPATIENT
Start: 2019-12-16 | End: 2020-02-11 | Stop reason: SDUPTHER

## 2019-12-16 RX ORDER — SOLIFENACIN SUCCINATE 5 MG/1
TABLET, FILM COATED ORAL
Qty: 30 TABLET | Refills: 3 | Status: SHIPPED | OUTPATIENT
Start: 2019-12-16 | End: 2020-03-25

## 2019-12-20 DIAGNOSIS — E78.2 MIXED HYPERLIPIDEMIA: ICD-10-CM

## 2019-12-20 RX ORDER — SIMVASTATIN 40 MG
TABLET ORAL
Qty: 30 TABLET | Refills: 3 | Status: SHIPPED | OUTPATIENT
Start: 2019-12-20 | End: 2020-04-01

## 2020-01-20 DIAGNOSIS — F41.9 ANXIETY AND DEPRESSION: ICD-10-CM

## 2020-01-20 DIAGNOSIS — F32.A ANXIETY AND DEPRESSION: ICD-10-CM

## 2020-01-20 RX ORDER — DULOXETIN HYDROCHLORIDE 60 MG/1
CAPSULE, DELAYED RELEASE ORAL
Qty: 180 CAPSULE | Refills: 1 | Status: SHIPPED | OUTPATIENT
Start: 2020-01-20 | End: 2020-04-28

## 2020-02-11 ENCOUNTER — OFFICE VISIT (OUTPATIENT)
Dept: INTERNAL MEDICINE CLINIC | Facility: CLINIC | Age: 80
End: 2020-02-11
Payer: COMMERCIAL

## 2020-02-11 VITALS
SYSTOLIC BLOOD PRESSURE: 142 MMHG | BODY MASS INDEX: 24.19 KG/M2 | HEIGHT: 59 IN | RESPIRATION RATE: 18 BRPM | TEMPERATURE: 97.5 F | WEIGHT: 120 LBS | DIASTOLIC BLOOD PRESSURE: 66 MMHG

## 2020-02-11 DIAGNOSIS — I73.9 PAD (PERIPHERAL ARTERY DISEASE) (HCC): ICD-10-CM

## 2020-02-11 DIAGNOSIS — IMO0001 UNCONTROLLED TYPE 2 DIABETES MELLITUS WITHOUT COMPLICATION, WITHOUT LONG-TERM CURRENT USE OF INSULIN: ICD-10-CM

## 2020-02-11 DIAGNOSIS — J20.9 ACUTE BRONCHITIS, UNSPECIFIED ORGANISM: Primary | ICD-10-CM

## 2020-02-11 PROCEDURE — 4040F PNEUMOC VAC/ADMIN/RCVD: CPT | Performed by: INTERNAL MEDICINE

## 2020-02-11 PROCEDURE — 3078F DIAST BP <80 MM HG: CPT | Performed by: INTERNAL MEDICINE

## 2020-02-11 PROCEDURE — 3008F BODY MASS INDEX DOCD: CPT | Performed by: INTERNAL MEDICINE

## 2020-02-11 PROCEDURE — 1036F TOBACCO NON-USER: CPT | Performed by: INTERNAL MEDICINE

## 2020-02-11 PROCEDURE — 1160F RVW MEDS BY RX/DR IN RCRD: CPT | Performed by: INTERNAL MEDICINE

## 2020-02-11 PROCEDURE — 3077F SYST BP >= 140 MM HG: CPT | Performed by: INTERNAL MEDICINE

## 2020-02-11 PROCEDURE — 99214 OFFICE O/P EST MOD 30 MIN: CPT | Performed by: INTERNAL MEDICINE

## 2020-02-11 RX ORDER — CLOPIDOGREL BISULFATE 75 MG/1
75 TABLET ORAL DAILY
Qty: 30 TABLET | Refills: 3 | Status: SHIPPED | OUTPATIENT
Start: 2020-02-11 | End: 2020-06-12

## 2020-02-11 RX ORDER — CEFUROXIME AXETIL 250 MG/1
250 TABLET ORAL EVERY 12 HOURS SCHEDULED
Qty: 20 TABLET | Refills: 0 | Status: SHIPPED | OUTPATIENT
Start: 2020-02-11 | End: 2020-02-21

## 2020-02-11 NOTE — PROGRESS NOTES
Assessment/Plan:     No evidence of pneumonia but with her significant cough she probably has at least bronchitis after a month  Will treat with antibiotic given her other medical problems  Quality Measures:       Return if symptoms worsen or fail to improve  No problem-specific Assessment & Plan notes found for this encounter  Diagnoses and all orders for this visit:    Acute bronchitis, unspecified organism  -     cefuroxime (CEFTIN) 250 mg tablet; Take 1 tablet (250 mg total) by mouth every 12 (twelve) hours for 10 days    Uncontrolled type 2 diabetes mellitus without complication, without long-term current use of insulin ([facility])  -     metFORMIN (GLUCOPHAGE) 1000 MG tablet; Take 1 tablet (1,000 mg total) by mouth 2 (two) times a day with meals    PAD (peripheral artery disease) ([facility])  -     clopidogrel (PLAVIX) 75 mg tablet; Take 1 tablet (75 mg total) by mouth daily          Subjective:      Patient ID: Brian Mejia is a [de-identified] y o  female  Patient comes in today, brought in by her son because she has been coughing in complaining of congestion  He thought it was for only a few days but she thinks she has been sick for a month  She admits that she was afraid that she initially had pneumonia and was afraid to come in for fear of being hospitalized  She thought she was getting better but now has more of a persistent cough and some head congestion  Tried over-the-counter remedies  She states throughout this her sugars have been good but she has been sleepy a lot lately  ALLERGIES:  Allergies   Allergen Reactions    Calcipotriene Hives    Lactose GI Intolerance    Penicillin V Hives     Category:  Allergy;     Propoxyphene Rash       CURRENT MEDICATIONS:    Current Outpatient Medications:     Blood Glucose Monitoring Suppl (ONE TOUCH ULTRA 2) w/Device KIT, by Does not apply route 2 (two) times a day, Disp: 1 each, Rfl: 3    clopidogrel (PLAVIX) 75 mg tablet, Take 1 tablet (75 mg total) by mouth daily, Disp: 30 tablet, Rfl: 3    Continuous Blood Gluc  (FREESTYLE RIAZ READER) MAURY, 1 Units by Does not apply route 4 (four) times a day, Disp: 1 Device, Rfl: 0    Continuous Blood Gluc Sensor (FREESTYLE RIAZ SENSOR SYSTEM) MISC, Check sugars 4 times a day or as needed  , Disp: 3 each, Rfl: 0    DULoxetine (CYMBALTA) 60 mg delayed release capsule, take 1 capsule twice a day, Disp: 180 capsule, Rfl: 1    ferrous sulfate 325 (65 Fe) mg tablet, Take 325 mg by mouth, Disp: , Rfl:     furosemide (LASIX) 40 mg tablet, take 1 tablet by mouth once daily, Disp: 30 tablet, Rfl: 5    gabapentin (NEURONTIN) 100 mg capsule, take 1 capsule by mouth three times a day, Disp: 90 capsule, Rfl: 5    glucose blood (ONE TOUCH ULTRA TEST) test strip, Check sugar twice a day, Disp: 100 each, Rfl: 5    insulin detemir (LEVEMIR FLEXTOUCH) 100 Units/mL injection pen, Inject 15 Units under the skin daily, Disp: 5 pen, Rfl: 5    Insulin Pen Needle (BD PEN NEEDLE ARNOLDO U/F) 32G X 4 MM MISC, by Does not apply route daily, Disp: 100 each, Rfl: 5    Lancets (ONETOUCH ULTRASOFT) lancets, Check sugar twice a day, Disp: 100 each, Rfl: 5    lisinopril (ZESTRIL) 20 mg tablet, take 2 tablets by mouth once daily, Disp: 180 tablet, Rfl: 0    metFORMIN (GLUCOPHAGE) 1000 MG tablet, Take 1 tablet (1,000 mg total) by mouth 2 (two) times a day with meals, Disp: 60 tablet, Rfl: 3    metoprolol tartrate (LOPRESSOR) 25 mg tablet, Take 25 mg by mouth 2 (two) times a day, Disp: , Rfl:     nicotine (NICODERM CQ) 14 mg/24hr TD 24 hr patch, Place 1 patch on the skin every 24 hours, Disp: , Rfl:     nitroglycerin (NITROSTAT) 0 4 mg SL tablet, 1 tab(s), Disp: , Rfl:     simvastatin (ZOCOR) 40 mg tablet, take 1 tablet by mouth once daily, Disp: 30 tablet, Rfl: 3    solifenacin (VESICARE) 5 mg tablet, take 1 tablet by mouth once daily, Disp: 30 tablet, Rfl: 3    amLODIPine (NORVASC) 5 mg tablet, take 1 tablet by mouth once daily (Patient not taking: Reported on 11/8/2019), Disp: 90 tablet, Rfl: 0    cefuroxime (CEFTIN) 250 mg tablet, Take 1 tablet (250 mg total) by mouth every 12 (twelve) hours for 10 days, Disp: 20 tablet, Rfl: 0    ACTIVE PROBLEM LIST:  Patient Active Problem List   Diagnosis    Allergic rhinitis    Atherosclerosis of native artery of both lower extremities with intermittent claudication (HCC)    Carotid stenosis, bilateral    Carpal tunnel syndrome on both sides    Depression    Diabetes mellitus type 2, uncontrolled ([facility] )    Diabetic peripheral neuropathy ([facility] )    Hyperlipidemia    Essential hypertension    Mesenteric artery stenosis (HCC)    Seborrheic keratosis    Skin lesion    Vitamin D deficiency    Cerebral infarction ([facility] )    Peripheral neuropathy    Trigger little finger of right hand    Urinary incontinence in female    Vertigo    Degenerative joint disease (DJD) of lumbar spine    Pain of right hip joint       PAST MEDICAL HISTORY:  Past Medical History:   Diagnosis Date    Colonic polyp     Diverticulosis     large intestine without hemmorhage    T12 compression fracture ([facility] ) 3/1/2019       PAST SURGICAL HISTORY:  Past Surgical History:   Procedure Laterality Date    BREAST SURGERY      CAROTID ENDARTERECTOMY      CORONARY ANGIOPLASTY WITH STENT PLACEMENT      leg and chest    CORONARY ANGIOPLASTY WITH STENT PLACEMENT      adominal stent    CORONARY ARTERY BYPASS GRAFT      triple artery    TONSILLECTOMY         FAMILY HISTORY:  Family History   Problem Relation Age of Onset    Lung cancer Mother     Migraines Mother     Stroke Brother        SOCIAL HISTORY:  Social History     Socioeconomic History    Marital status:       Spouse name: Not on file    Number of children: Not on file    Years of education: Not on file    Highest education level: Not on file   Occupational History     Comment: retired   Social Needs    Financial resource strain: Not on [facility] insecurity:     Worry: Not on file     Inability: Not on file    Transportation needs:     Medical: Not on file     Non-medical: Not on file   Tobacco Use    Smoking status: Former Smoker    Smokeless tobacco: Never Used   Substance and Sexual Activity    Alcohol use: Yes    Drug use: No    Sexual activity: Never     Partners: Male   Lifestyle    Physical activity:     Days per week: Not on file     Minutes per session: Not on file    Stress: Not on file   Relationships    Social connections:     Talks on phone: Not on file     Gets together: Not on file     Attends Protestant service: Not on file     Active member of club or organization: Not on file     Attends meetings of clubs or organizations: Not on file     Relationship status: Not on file    Intimate partner violence:     Fear of current or ex partner: Not on file     Emotionally abused: Not on file     Physically abused: Not on file     Forced sexual activity: Not on file   Other Topics Concern    Not on file   Social History Narrative    Not on file       Review of Systems   Constitutional: Negative for fever  HENT: Positive for congestion  Respiratory: Positive for cough  Negative for shortness of breath  Cardiovascular: Negative for chest pain  Gastrointestinal: Negative for abdominal pain  Objective:  Vitals:    02/11/20 1531   BP: 142/66   BP Location: Left arm   Patient Position: Sitting   Cuff Size: Adult   Resp: 18   Temp: 97 5 °F (36 4 °C)   Weight: 54 4 kg (120 lb)   Height: 4' 11" (1 499 m)     Body mass index is 24 24 kg/m²  Physical Exam   Constitutional: She is oriented to person, place, and time  She appears well-developed and well-nourished  HENT:   Nasal congestion   Cardiovascular: Normal rate, regular rhythm and normal heart sounds  Pulmonary/Chest: Effort normal and breath sounds normal  No respiratory distress  She has no wheezes  She has no rales  Abdominal: Soft  There is no tenderness  Neurological: She is alert and oriented to person, place, and time  Nursing note and vitals reviewed  RESULTS:    No results found for this or any previous visit (from the past 1008 hour(s))  This note was created with voice recognition software  Phonic, grammatical and spelling errors may be present within the note as a result

## 2020-02-18 DIAGNOSIS — E11.649 UNCONTROLLED TYPE 2 DIABETES MELLITUS WITH HYPOGLYCEMIA WITHOUT COMA (HCC): ICD-10-CM

## 2020-02-18 RX ORDER — LISINOPRIL 20 MG/1
TABLET ORAL
Qty: 180 TABLET | Refills: 0 | Status: SHIPPED | OUTPATIENT
Start: 2020-02-18 | End: 2020-05-21

## 2020-02-19 ENCOUNTER — TELEPHONE (OUTPATIENT)
Dept: OTHER | Facility: OTHER | Age: 80
End: 2020-02-19

## 2020-02-19 DIAGNOSIS — J20.9 ACUTE BRONCHITIS, UNSPECIFIED ORGANISM: Primary | ICD-10-CM

## 2020-02-19 NOTE — TELEPHONE ENCOUNTER
PT's son called in stating his mother may need a stronger medication called in because she is still coughing  Please call PT's son back

## 2020-02-20 RX ORDER — BENZONATATE 100 MG/1
100 CAPSULE ORAL 3 TIMES DAILY PRN
Qty: 20 CAPSULE | Refills: 0 | Status: SHIPPED | OUTPATIENT
Start: 2020-02-20 | End: 2021-05-11 | Stop reason: ALTCHOICE

## 2020-02-20 NOTE — TELEPHONE ENCOUNTER
He stated that she has a lingering cough even after finishing her medication  No fever or other symptoms

## 2020-02-24 DIAGNOSIS — E11.649 UNCONTROLLED TYPE 2 DIABETES MELLITUS WITH HYPOGLYCEMIA WITHOUT COMA (HCC): ICD-10-CM

## 2020-02-26 ENCOUNTER — TELEPHONE (OUTPATIENT)
Dept: OTHER | Facility: OTHER | Age: 80
End: 2020-02-26

## 2020-02-26 NOTE — TELEPHONE ENCOUNTER
Terry/son is calling to ask the office to please give him a call tomorrow morning regarding this patient his mother  She was seen on 2/11/2020 and given Ceftin which she completed but her cough has not completely gone away  He would like to bring her back in on Friday to be re-evaluated  I offered him a triage call with his mother but he is not with her now and he is actually going the other way from her home  He just wanted to leave a message  for an appt  before the weekend comes  Please call him back at [phone]

## 2020-02-27 NOTE — TELEPHONE ENCOUNTER
Spoke with Olga Johnson he says his moms cough has not gotten any worse but has not gotten any better, He wants to know if he should bring her back to be re checked or if they should wait longer?

## 2020-02-28 ENCOUNTER — HOSPITAL ENCOUNTER (OUTPATIENT)
Dept: RADIOLOGY | Facility: HOSPITAL | Age: 80
Discharge: HOME | End: 2020-02-28
Attending: INTERNAL MEDICINE
Payer: COMMERCIAL

## 2020-02-28 ENCOUNTER — OFFICE VISIT (OUTPATIENT)
Dept: INTERNAL MEDICINE CLINIC | Facility: CLINIC | Age: 80
End: 2020-02-28
Payer: COMMERCIAL

## 2020-02-28 VITALS
HEART RATE: 66 BPM | WEIGHT: 124 LBS | RESPIRATION RATE: 14 BRPM | BODY MASS INDEX: 25 KG/M2 | OXYGEN SATURATION: 99 % | TEMPERATURE: 98 F | SYSTOLIC BLOOD PRESSURE: 144 MMHG | DIASTOLIC BLOOD PRESSURE: 80 MMHG | HEIGHT: 59 IN

## 2020-02-28 DIAGNOSIS — R05.9 COUGH: ICD-10-CM

## 2020-02-28 DIAGNOSIS — R09.89 BILATERAL RALES: Primary | ICD-10-CM

## 2020-02-28 DIAGNOSIS — R09.89 BILATERAL RALES: ICD-10-CM

## 2020-02-28 PROCEDURE — 99214 OFFICE O/P EST MOD 30 MIN: CPT | Performed by: INTERNAL MEDICINE

## 2020-02-28 PROCEDURE — 3079F DIAST BP 80-89 MM HG: CPT | Performed by: INTERNAL MEDICINE

## 2020-02-28 PROCEDURE — 1160F RVW MEDS BY RX/DR IN RCRD: CPT | Performed by: INTERNAL MEDICINE

## 2020-02-28 PROCEDURE — 1036F TOBACCO NON-USER: CPT | Performed by: INTERNAL MEDICINE

## 2020-02-28 PROCEDURE — 3077F SYST BP >= 140 MM HG: CPT | Performed by: INTERNAL MEDICINE

## 2020-02-28 PROCEDURE — 4040F PNEUMOC VAC/ADMIN/RCVD: CPT | Performed by: INTERNAL MEDICINE

## 2020-02-28 PROCEDURE — 71046 X-RAY EXAM CHEST 2 VIEWS: CPT

## 2020-02-28 PROCEDURE — 3008F BODY MASS INDEX DOCD: CPT | Performed by: INTERNAL MEDICINE

## 2020-02-28 RX ORDER — AZITHROMYCIN 250 MG/1
TABLET, FILM COATED ORAL
Qty: 6 TABLET | Refills: 0 | Status: SHIPPED | OUTPATIENT
Start: 2020-02-28 | End: 2020-03-03

## 2020-02-28 NOTE — PROGRESS NOTES
Assessment/Plan:     She either has some heart failure or pneumonia now  Told her to resume her water pill, at least a half tablet a day for now  Stat chest x-ray  Will try her on Zithromax to also cover atypicals  Quality Measures:       Return in about 1 week (around 3/6/2020)  No problem-specific Assessment & Plan notes found for this encounter  Diagnoses and all orders for this visit:    Bilateral rales  -     XR chest pa & lateral; Future  -     azithromycin (ZITHROMAX) 250 mg tablet; 2 tabs today then 1 tab daily for 4 days    Cough  -     XR chest pa & lateral; Future  -     azithromycin (ZITHROMAX) 250 mg tablet; 2 tabs today then 1 tab daily for 4 days          Subjective:      Patient ID: Blanca Putnam is a [de-identified] y o  female  Patient comes in today with her son because he had called stating that she still has the cough  She finished the antibiotics  [facility] did nothing  She gets the cough episodically throughout the day  Not related to food  Not always productive  But she has these coughing spells  No medication changes but she has not been taking her water pill  She feels "she pees enough "      ALLERGIES:  Allergies   Allergen Reactions    Calcipotriene Hives    Lactose GI Intolerance    Penicillin V Hives     Category:  Allergy;     Propoxyphene Rash       CURRENT MEDICATIONS:    Current Outpatient Medications:     amLODIPine (NORVASC) 5 mg tablet, take 1 tablet by mouth once daily, Disp: 90 tablet, Rfl: 0    benzonatate (TESSALON PERLES) 100 mg capsule, Take 1 capsule (100 mg total) by mouth 3 (three) times a day as needed for cough, Disp: 20 capsule, Rfl: 0    Blood Glucose Monitoring Suppl (ONE TOUCH ULTRA 2) w/Device KIT, by Does not apply route 2 (two) times a day, Disp: 1 each, Rfl: 3    clopidogrel (PLAVIX) 75 mg tablet, Take 1 tablet (75 mg total) by mouth daily, Disp: 30 tablet, Rfl: 3    Continuous Blood Gluc  (FREESTYLE RIAZ READER) MAURY 1 Units by Does not apply route 4 (four) times a day, Disp: 1 Device, Rfl: 0    Continuous Blood Gluc Sensor (FREESTYLE RIAZ SENSOR SYSTEM) MISC, Check sugars 4 times a day or as needed  , Disp: 3 each, Rfl: 0    DULoxetine (CYMBALTA) 60 mg delayed release capsule, take 1 capsule twice a day, Disp: 180 capsule, Rfl: 1    gabapentin (NEURONTIN) 100 mg capsule, take 1 capsule by mouth three times a day, Disp: 90 capsule, Rfl: 5    insulin detemir (LEVEMIR FLEXTOUCH) 100 Units/mL injection pen, Inject 15 Units under the skin daily, Disp: 5 pen, Rfl: 5    Insulin Pen Needle (BD PEN NEEDLE ARNOLDO U/F) 32G X 4 MM MISC, by Does not apply route daily, Disp: 100 each, Rfl: 5    Lancets (ONETOUCH ULTRASOFT) lancets, Check sugar twice a day, Disp: 100 each, Rfl: 5    lisinopril (ZESTRIL) 20 mg tablet, take 2 tablets by mouth once daily, Disp: 180 tablet, Rfl: 0    metFORMIN (GLUCOPHAGE) 1000 MG tablet, Take 1 tablet (1,000 mg total) by mouth 2 (two) times a day with meals, Disp: 60 tablet, Rfl: 3    simvastatin (ZOCOR) 40 mg tablet, take 1 tablet by mouth once daily, Disp: 30 tablet, Rfl: 3    solifenacin (VESICARE) 5 mg tablet, take 1 tablet by mouth once daily, Disp: 30 tablet, Rfl: 3    azithromycin (ZITHROMAX) 250 mg tablet, 2 tabs today then 1 tab daily for 4 days, Disp: 6 tablet, Rfl: 0    ferrous sulfate 325 (65 Fe) mg tablet, Take 325 mg by mouth, Disp: , Rfl:     furosemide (LASIX) 40 mg tablet, take 1 tablet by mouth once daily (Patient not taking: Reported on 2/28/2020), Disp: 30 tablet, Rfl: 5    glucose blood test strip, TEST twice a day, Disp: 100 each, Rfl: 5    metoprolol tartrate (LOPRESSOR) 25 mg tablet, Take 25 mg by mouth 2 (two) times a day, Disp: , Rfl:     nicotine (NICODERM CQ) 14 mg/24hr TD 24 hr patch, Place 1 patch on the skin every 24 hours, Disp: , Rfl:     nitroglycerin (NITROSTAT) 0 4 mg SL tablet, 1 tab(s), Disp: , Rfl:     ACTIVE PROBLEM LIST:  Patient Active Problem List Diagnosis    Allergic rhinitis    Atherosclerosis of native artery of both lower extremities with intermittent claudication (HCC)    Carotid stenosis, bilateral    Carpal tunnel syndrome on both sides    Depression    Diabetes mellitus type 2, uncontrolled ([place] [address] )    Diabetic peripheral neuropathy ([address] )    Hyperlipidemia    Essential hypertension    Mesenteric artery stenosis (HCC)    Seborrheic keratosis    Skin lesion    Vitamin D deficiency    Cerebral infarction (HCC)    Peripheral neuropathy    Trigger little finger of right hand    Urinary incontinence in female    Vertigo    Degenerative joint disease (DJD) of lumbar spine    Pain of right hip joint       PAST MEDICAL HISTORY:  Past Medical History:   Diagnosis Date    Colonic polyp     Diverticulosis     large intestine without hemmorhage    T12 compression fracture ([facility] ) 3/1/2019       PAST SURGICAL HISTORY:  Past Surgical History:   Procedure Laterality Date    BREAST SURGERY      CAROTID ENDARTERECTOMY      CORONARY ANGIOPLASTY WITH STENT PLACEMENT      leg and chest    CORONARY ANGIOPLASTY WITH STENT PLACEMENT      adominal stent    CORONARY ARTERY BYPASS GRAFT      triple artery    TONSILLECTOMY         FAMILY HISTORY:  Family History   Problem Relation Age of Onset    Lung cancer Mother     Migraines Mother     Stroke Brother        SOCIAL HISTORY:  Social History     Socioeconomic History    Marital status:       Spouse name: Not on file    Number of children: Not on file    Years of education: Not on file    Highest education level: Not on file   Occupational History     Comment: retired   Social Needs    Financial resource strain: Not on file    Food insecurity:     Worry: Not on file     Inability: Not on file   [employer] needs:     Medical: Not on file     Non-medical: Not on file   Tobacco Use    Smoking status: Former Smoker    Smokeless tobacco: Never Used   Substance and Sexual Activity    Alcohol use: Yes    Drug use: No    Sexual activity: Never     Partners: Male   Lifestyle    Physical activity:     Days per week: Not on file     Minutes per session: Not on file    Stress: Not on file   Relationships    Social connections:     Talks on phone: Not on file     Gets together: Not on file     Attends [employer] service: Not on file     Active member of club or organization: Not on file     Attends meetings of clubs or organizations: Not on file     Relationship status: Not on file    Intimate partner violence:     Fear of current or ex partner: Not on file     Emotionally abused: Not on file     Physically abused: Not on file     Forced sexual activity: Not on file   Other Topics Concern    Not on file   Social History Narrative    Not on file       Review of Systems   Constitutional: Negative for fever  Respiratory: Positive for cough  Negative for shortness of breath and wheezing  Objective:  Vitals:    02/28/20 1416   BP: 144/80   BP Location: Left arm   Patient Position: Sitting   Cuff Size: Standard   Pulse: 66   Resp: 14   Temp: 98 °F (36 7 °C)   TempSrc: Oral   SpO2: 99%   Weight: 56 2 kg (124 lb)   Height: 4' 11" (1 499 m)     Body mass index is 25 04 kg/m²  Physical Exam   Constitutional: She is oriented to person, place, and time  She appears well-developed and well-nourished  Cardiovascular: Normal rate, regular rhythm and normal heart sounds  Pulmonary/Chest: Effort normal  She has rales (Both bases)  Neurological: She is alert and oriented to person, place, and time  Nursing note and vitals reviewed  RESULTS:    No results found for this or any previous visit (from the past 1008 hour(s))  This note was created with voice recognition software  Phonic, grammatical and spelling errors may be present within the note as a result

## 2020-03-05 ENCOUNTER — OFFICE VISIT (OUTPATIENT)
Dept: INTERNAL MEDICINE CLINIC | Facility: CLINIC | Age: 80
End: 2020-03-05
Payer: COMMERCIAL

## 2020-03-05 VITALS
OXYGEN SATURATION: 96 % | WEIGHT: 124 LBS | SYSTOLIC BLOOD PRESSURE: 150 MMHG | BODY MASS INDEX: 25 KG/M2 | DIASTOLIC BLOOD PRESSURE: 78 MMHG | HEART RATE: 66 BPM | HEIGHT: 59 IN

## 2020-03-05 DIAGNOSIS — R05.9 COUGH: Primary | ICD-10-CM

## 2020-03-05 DIAGNOSIS — I10 ESSENTIAL HYPERTENSION: ICD-10-CM

## 2020-03-05 PROCEDURE — 3077F SYST BP >= 140 MM HG: CPT | Performed by: INTERNAL MEDICINE

## 2020-03-05 PROCEDURE — 4040F PNEUMOC VAC/ADMIN/RCVD: CPT | Performed by: INTERNAL MEDICINE

## 2020-03-05 PROCEDURE — 3078F DIAST BP <80 MM HG: CPT | Performed by: INTERNAL MEDICINE

## 2020-03-05 PROCEDURE — 99213 OFFICE O/P EST LOW 20 MIN: CPT | Performed by: INTERNAL MEDICINE

## 2020-03-05 PROCEDURE — 1036F TOBACCO NON-USER: CPT | Performed by: INTERNAL MEDICINE

## 2020-03-05 PROCEDURE — 3008F BODY MASS INDEX DOCD: CPT | Performed by: INTERNAL MEDICINE

## 2020-03-05 PROCEDURE — 1160F RVW MEDS BY RX/DR IN RCRD: CPT | Performed by: INTERNAL MEDICINE

## 2020-03-05 RX ORDER — FUROSEMIDE 20 MG/1
20 TABLET ORAL DAILY
Qty: 30 TABLET | Refills: 3 | Status: SHIPPED | OUTPATIENT
Start: 2020-03-05 | End: 2020-06-22

## 2020-03-05 NOTE — PROGRESS NOTES
Assessment/Plan:     Seems to be doing better  Will send a new prescription for the water pill at a half tablet no potassium at this point  Check labs on Monday or Tuesday  Resume regular follow-up  Quality Measures:       No follow-ups on file  No problem-specific Assessment & Plan notes found for this encounter  Diagnoses and all orders for this visit:    Cough    Essential hypertension  -     furosemide (LASIX) 20 mg tablet; Take 1 tablet (20 mg total) by mouth daily  -     Basic metabolic panel; Future          Subjective:      Patient ID: Tiffanie Pham is a [de-identified] y o  female  Patient comes in today for follow-up of her cough  She was quite upset but driving here, she got a flat tire so she was distracted  She admits her cough is much better  She finished the antibiotics  She is actually taking a full tablet of the water pills, 40 mg  No potassium  No leg cramps  Admits she feels better in that regards  Just upset over the flat tire  Her chest x-ray showed some mild congestion  No evidence of pneumonia or significant heart failure  ALLERGIES:  Allergies   Allergen Reactions    Calcipotriene Hives    Lactose GI Intolerance    Penicillin V Hives     Category:  Allergy;     Propoxyphene Rash       CURRENT MEDICATIONS:    Current Outpatient Medications:     amLODIPine (NORVASC) 5 mg tablet, take 1 tablet by mouth once daily, Disp: 90 tablet, Rfl: 0    benzonatate (TESSALON PERLES) 100 mg capsule, Take 1 capsule (100 mg total) by mouth 3 (three) times a day as needed for cough, Disp: 20 capsule, Rfl: 0    Blood Glucose Monitoring Suppl (ONE TOUCH ULTRA 2) w/Device KIT, by Does not apply route 2 (two) times a day, Disp: 1 each, Rfl: 3    clopidogrel (PLAVIX) 75 mg tablet, Take 1 tablet (75 mg total) by mouth daily, Disp: 30 tablet, Rfl: 3    Continuous Blood Gluc  (FREESTYLE RIAZ READER) MAURY, 1 Units by Does not apply route 4 (four) times a day, Disp: 1 Device, Rfl: 0    Continuous Blood Gluc Sensor (FREESTYLE RIAZ SENSOR SYSTEM) MISC, Check sugars 4 times a day or as needed  , Disp: 3 each, Rfl: 0    DULoxetine (CYMBALTA) 60 mg delayed release capsule, take 1 capsule twice a day, Disp: 180 capsule, Rfl: 1    ferrous sulfate 325 (65 Fe) mg tablet, Take 325 mg by mouth, Disp: , Rfl:     gabapentin (NEURONTIN) 100 mg capsule, take 1 capsule by mouth three times a day, Disp: 90 capsule, Rfl: 5    glucose blood test strip, TEST twice a day, Disp: 100 each, Rfl: 5    insulin detemir (LEVEMIR FLEXTOUCH) 100 Units/mL injection pen, Inject 15 Units under the skin daily, Disp: 5 pen, Rfl: 5    Insulin Pen Needle (BD PEN NEEDLE ARNOLDO U/F) 32G X 4 MM MISC, by Does not apply route daily, Disp: 100 each, Rfl: 5    Lancets (ONETOUCH ULTRASOFT) lancets, Check sugar twice a day, Disp: 100 each, Rfl: 5    lisinopril (ZESTRIL) 20 mg tablet, take 2 tablets by mouth once daily, Disp: 180 tablet, Rfl: 0    metFORMIN (GLUCOPHAGE) 1000 MG tablet, Take 1 tablet (1,000 mg total) by mouth 2 (two) times a day with meals, Disp: 60 tablet, Rfl: 3    metoprolol tartrate (LOPRESSOR) 25 mg tablet, Take 25 mg by mouth 2 (two) times a day, Disp: , Rfl:     nicotine (NICODERM CQ) 14 mg/24hr TD 24 hr patch, Place 1 patch on the skin every 24 hours, Disp: , Rfl:     nitroglycerin (NITROSTAT) 0 4 mg SL tablet, 1 tab(s), Disp: , Rfl:     simvastatin (ZOCOR) 40 mg tablet, take 1 tablet by mouth once daily, Disp: 30 tablet, Rfl: 3    solifenacin (VESICARE) 5 mg tablet, take 1 tablet by mouth once daily, Disp: 30 tablet, Rfl: 3    furosemide (LASIX) 20 mg tablet, Take 1 tablet (20 mg total) by mouth daily, Disp: 30 tablet, Rfl: 3    ACTIVE PROBLEM LIST:  Patient Active Problem List   Diagnosis    Allergic rhinitis    Atherosclerosis of native artery of both lower extremities with intermittent claudication (HCC)    Carotid stenosis, bilateral    Carpal tunnel syndrome on both sides    Depression    Diabetes mellitus type 2, uncontrolled ([facility] [address] )    Diabetic peripheral neuropathy (HCC)    Hyperlipidemia    Essential hypertension    Mesenteric artery stenosis (HCC)    Seborrheic keratosis    Skin lesion    Vitamin D deficiency    Cerebral infarction (HCC)    Peripheral neuropathy    Trigger little finger of right hand    Urinary incontinence in female    Vertigo    Degenerative joint disease (DJD) of lumbar spine    Pain of right hip joint       PAST MEDICAL HISTORY:  Past Medical History:   Diagnosis Date    Colonic polyp     Diverticulosis     large intestine without hemmorhage    T12 [facility] fracture ([facility]) 3/1/2019       PAST SURGICAL HISTORY:  Past Surgical History:   Procedure Laterality Date    BREAST SURGERY      CAROTID ENDARTERECTOMY      CORONARY ANGIOPLASTY WITH STENT PLACEMENT      leg and chest    CORONARY ANGIOPLASTY WITH STENT PLACEMENT      adominal stent    CORONARY ARTERY BYPASS GRAFT      triple artery    TONSILLECTOMY         FAMILY HISTORY:  Family History   Problem Relation Age of Onset    Lung cancer Mother     Migraines Mother     Stroke Brother        SOCIAL HISTORY:  Social History     Socioeconomic History    Marital status:      Spouse name: Not on file    Number of children: Not on file    Years of education: Not on file    Highest education level: Not on file   Occupational History     Comment: retired   Social Needs    Financial resource strain: Not on file    Food insecurity:     Worry: Not on file     Inability: Not on file   [employer] needs:     Medical: Not on file     Non-medical: Not on file   Tobacco Use    Smoking status: Former Smoker    Smokeless tobacco: Never Used   Substance and Sexual Activity    Alcohol use:  Yes    Drug use: No    Sexual activity: Never     Partners: Male   Lifestyle    Physical activity:     Days per week: Not on file     Minutes per session: Not on file    Stress: Not on file   Relationships    Social connections:     Talks on phone: Not on file     Gets together: Not on file     Attends [employer] service: Not on file     Active member of club or organization: Not on file     Attends meetings of clubs or organizations: Not on file     Relationship status: Not on file    Intimate partner violence:     Fear of current or ex partner: Not on file     Emotionally abused: Not on file     Physically abused: Not on file     Forced sexual activity: Not on file   Other Topics Concern    Not on file   Social History Narrative    Not on file       Review of Systems   Respiratory: Negative for shortness of breath  Cardiovascular: Negative for chest pain  Gastrointestinal: Negative for abdominal pain  Objective:  Vitals:    03/05/20 1419   BP: 150/78   BP Location: Left arm   Patient Position: Sitting   Cuff Size: Large   Pulse: 66   SpO2: 96%   Weight: 56 2 kg (124 lb)   Height: 4' 11" (1 499 m)     Body mass index is 25 04 kg/m²  Physical Exam   Constitutional: She appears well-developed and well-nourished  Cardiovascular: Normal rate, regular rhythm and normal heart sounds  Pulmonary/Chest: Effort normal and breath sounds normal    Neurological: She is alert  Nursing note and vitals reviewed  RESULTS:    No results found for this or any previous visit (from the past 1008 hour(s))  This note was created with voice recognition software  Phonic, grammatical and spelling errors may be present within the note as a result

## 2020-03-09 ENCOUNTER — APPOINTMENT (OUTPATIENT)
Dept: LAB | Facility: CLINIC | Age: 80
End: 2020-03-09
Payer: COMMERCIAL

## 2020-03-09 DIAGNOSIS — I10 ESSENTIAL HYPERTENSION: ICD-10-CM

## 2020-03-09 LAB
ANION GAP SERPL CALCULATED.3IONS-SCNC: 8 MMOL/L (ref 4–13)
BUN SERPL-MCNC: 18 MG/DL (ref 5–25)
CALCIUM SERPL-MCNC: 9.2 MG/DL (ref 8.3–10.1)
CHLORIDE SERPL-SCNC: 101 MMOL/L (ref 100–108)
CO2 SERPL-SCNC: 27 MMOL/L (ref 21–32)
CREAT SERPL-MCNC: 0.73 MG/DL (ref 0.6–1.3)
GFR SERPL CREATININE-BSD FRML MDRD: 78 ML/MIN/1.73SQ M
GLUCOSE P FAST SERPL-MCNC: 123 MG/DL (ref 65–99)
POTASSIUM SERPL-SCNC: 4.3 MMOL/L (ref 3.5–5.3)
SODIUM SERPL-SCNC: 136 MMOL/L (ref 136–145)

## 2020-03-09 PROCEDURE — 36415 COLL VENOUS BLD VENIPUNCTURE: CPT

## 2020-03-09 PROCEDURE — 80048 BASIC METABOLIC PNL TOTAL CA: CPT

## 2020-03-25 DIAGNOSIS — R32 URINARY INCONTINENCE IN FEMALE: ICD-10-CM

## 2020-03-25 RX ORDER — SOLIFENACIN SUCCINATE 5 MG/1
TABLET, FILM COATED ORAL
Qty: 30 TABLET | Refills: 3 | Status: SHIPPED | OUTPATIENT
Start: 2020-03-25 | End: 2020-04-17

## 2020-04-01 DIAGNOSIS — E78.2 MIXED HYPERLIPIDEMIA: ICD-10-CM

## 2020-04-01 RX ORDER — SIMVASTATIN 40 MG
TABLET ORAL
Qty: 30 TABLET | Refills: 3 | Status: SHIPPED | OUTPATIENT
Start: 2020-04-01 | End: 2020-04-24

## 2020-04-17 DIAGNOSIS — R32 URINARY INCONTINENCE IN FEMALE: ICD-10-CM

## 2020-04-17 RX ORDER — SOLIFENACIN SUCCINATE 5 MG/1
TABLET, FILM COATED ORAL
Qty: 30 TABLET | Refills: 3 | Status: SHIPPED | OUTPATIENT
Start: 2020-04-17 | End: 2021-01-26

## 2020-04-24 DIAGNOSIS — E78.2 MIXED HYPERLIPIDEMIA: ICD-10-CM

## 2020-04-24 RX ORDER — SIMVASTATIN 40 MG
TABLET ORAL
Qty: 30 TABLET | Refills: 3 | Status: SHIPPED | OUTPATIENT
Start: 2020-04-24 | End: 2020-12-21

## 2020-04-28 DIAGNOSIS — F32.A ANXIETY AND DEPRESSION: ICD-10-CM

## 2020-04-28 DIAGNOSIS — F41.9 ANXIETY AND DEPRESSION: ICD-10-CM

## 2020-04-28 RX ORDER — DULOXETIN HYDROCHLORIDE 60 MG/1
CAPSULE, DELAYED RELEASE ORAL
Qty: 180 CAPSULE | Refills: 1 | Status: SHIPPED | OUTPATIENT
Start: 2020-04-28 | End: 2020-07-20

## 2020-05-05 DIAGNOSIS — E11.649 UNCONTROLLED TYPE 2 DIABETES MELLITUS WITH HYPOGLYCEMIA WITHOUT COMA (HCC): ICD-10-CM

## 2020-05-05 RX ORDER — INSULIN DETEMIR 100 [IU]/ML
INJECTION, SOLUTION SUBCUTANEOUS
Qty: 15 ML | Refills: 3 | Status: SHIPPED | OUTPATIENT
Start: 2020-05-05 | End: 2021-04-26

## 2020-05-06 LAB
LEFT EYE DIABETIC RETINOPATHY: NORMAL
RIGHT EYE DIABETIC RETINOPATHY: NORMAL

## 2020-05-21 DIAGNOSIS — E11.649 UNCONTROLLED TYPE 2 DIABETES MELLITUS WITH HYPOGLYCEMIA WITHOUT COMA (HCC): ICD-10-CM

## 2020-05-21 RX ORDER — LISINOPRIL 20 MG/1
TABLET ORAL
Qty: 180 TABLET | Refills: 1 | Status: SHIPPED | OUTPATIENT
Start: 2020-05-21 | End: 2020-11-10

## 2020-06-12 DIAGNOSIS — I73.9 PAD (PERIPHERAL ARTERY DISEASE) (HCC): ICD-10-CM

## 2020-06-12 RX ORDER — CLOPIDOGREL BISULFATE 75 MG/1
TABLET ORAL
Qty: 30 TABLET | Refills: 3 | Status: SHIPPED | OUTPATIENT
Start: 2020-06-12 | End: 2020-10-14

## 2020-06-21 DIAGNOSIS — I10 ESSENTIAL HYPERTENSION: ICD-10-CM

## 2020-06-22 RX ORDER — FUROSEMIDE 20 MG/1
TABLET ORAL
Qty: 30 TABLET | Refills: 3 | Status: SHIPPED | OUTPATIENT
Start: 2020-06-22 | End: 2020-10-26

## 2020-07-20 DIAGNOSIS — F41.9 ANXIETY AND DEPRESSION: ICD-10-CM

## 2020-07-20 DIAGNOSIS — F32.A ANXIETY AND DEPRESSION: ICD-10-CM

## 2020-07-20 RX ORDER — DULOXETIN HYDROCHLORIDE 60 MG/1
CAPSULE, DELAYED RELEASE ORAL
Qty: 180 CAPSULE | Refills: 1 | Status: SHIPPED | OUTPATIENT
Start: 2020-07-20 | End: 2021-05-13 | Stop reason: SDUPTHER

## 2020-07-30 DIAGNOSIS — E11.42 DIABETIC PERIPHERAL NEUROPATHY (HCC): ICD-10-CM

## 2020-07-30 RX ORDER — GABAPENTIN 100 MG/1
CAPSULE ORAL
Qty: 90 CAPSULE | Refills: 1 | Status: SHIPPED | OUTPATIENT
Start: 2020-07-30 | End: 2020-09-25

## 2020-08-28 DIAGNOSIS — E11.649 UNCONTROLLED TYPE 2 DIABETES MELLITUS WITH HYPOGLYCEMIA WITHOUT COMA (HCC): Primary | ICD-10-CM

## 2020-09-25 DIAGNOSIS — E11.42 DIABETIC PERIPHERAL NEUROPATHY (HCC): ICD-10-CM

## 2020-09-25 RX ORDER — GABAPENTIN 100 MG/1
CAPSULE ORAL
Qty: 90 CAPSULE | Refills: 1 | Status: SHIPPED | OUTPATIENT
Start: 2020-09-25 | End: 2020-11-22

## 2020-10-01 ENCOUNTER — OFFICE VISIT (OUTPATIENT)
Dept: INTERNAL MEDICINE CLINIC | Facility: CLINIC | Age: 80
End: 2020-10-01
Payer: COMMERCIAL

## 2020-10-01 VITALS
BODY MASS INDEX: 25.6 KG/M2 | HEART RATE: 62 BPM | SYSTOLIC BLOOD PRESSURE: 130 MMHG | WEIGHT: 127 LBS | DIASTOLIC BLOOD PRESSURE: 80 MMHG | HEIGHT: 59 IN | OXYGEN SATURATION: 93 % | TEMPERATURE: 98 F

## 2020-10-01 DIAGNOSIS — I70.213 ATHEROSCLEROSIS OF NATIVE ARTERY OF BOTH LOWER EXTREMITIES WITH INTERMITTENT CLAUDICATION (HCC): ICD-10-CM

## 2020-10-01 DIAGNOSIS — E11.649 UNCONTROLLED TYPE 2 DIABETES MELLITUS WITH HYPOGLYCEMIA WITHOUT COMA (HCC): ICD-10-CM

## 2020-10-01 DIAGNOSIS — E78.2 MIXED HYPERLIPIDEMIA: ICD-10-CM

## 2020-10-01 DIAGNOSIS — Z00.00 MEDICARE ANNUAL WELLNESS VISIT, SUBSEQUENT: Primary | ICD-10-CM

## 2020-10-01 PROCEDURE — 1036F TOBACCO NON-USER: CPT | Performed by: INTERNAL MEDICINE

## 2020-10-01 PROCEDURE — 1160F RVW MEDS BY RX/DR IN RCRD: CPT | Performed by: INTERNAL MEDICINE

## 2020-10-01 PROCEDURE — 3079F DIAST BP 80-89 MM HG: CPT | Performed by: INTERNAL MEDICINE

## 2020-10-01 PROCEDURE — 3725F SCREEN DEPRESSION PERFORMED: CPT | Performed by: INTERNAL MEDICINE

## 2020-10-01 PROCEDURE — 99214 OFFICE O/P EST MOD 30 MIN: CPT | Performed by: INTERNAL MEDICINE

## 2020-10-01 PROCEDURE — 1125F AMNT PAIN NOTED PAIN PRSNT: CPT | Performed by: INTERNAL MEDICINE

## 2020-10-01 PROCEDURE — 3075F SYST BP GE 130 - 139MM HG: CPT | Performed by: INTERNAL MEDICINE

## 2020-10-01 PROCEDURE — 1170F FXNL STATUS ASSESSED: CPT | Performed by: INTERNAL MEDICINE

## 2020-10-01 PROCEDURE — G0439 PPPS, SUBSEQ VISIT: HCPCS | Performed by: INTERNAL MEDICINE

## 2020-10-14 DIAGNOSIS — I73.9 PAD (PERIPHERAL ARTERY DISEASE) (HCC): ICD-10-CM

## 2020-10-14 LAB
LEFT EYE DIABETIC RETINOPATHY: NORMAL
RIGHT EYE DIABETIC RETINOPATHY: NORMAL

## 2020-10-14 RX ORDER — CLOPIDOGREL BISULFATE 75 MG/1
TABLET ORAL
Qty: 30 TABLET | Refills: 3 | Status: SHIPPED | OUTPATIENT
Start: 2020-10-14 | End: 2021-02-12

## 2020-10-20 DIAGNOSIS — E11.649 UNCONTROLLED TYPE 2 DIABETES MELLITUS WITH HYPOGLYCEMIA WITHOUT COMA (HCC): ICD-10-CM

## 2020-10-21 RX ORDER — PEN NEEDLE, DIABETIC 32GX 5/32"
NEEDLE, DISPOSABLE MISCELLANEOUS
Qty: 100 EACH | Refills: 5 | Status: SHIPPED | OUTPATIENT
Start: 2020-10-21

## 2020-10-24 DIAGNOSIS — I10 ESSENTIAL HYPERTENSION: ICD-10-CM

## 2020-10-24 DIAGNOSIS — E11.649 UNCONTROLLED TYPE 2 DIABETES MELLITUS WITH HYPOGLYCEMIA WITHOUT COMA (HCC): ICD-10-CM

## 2020-10-26 RX ORDER — FUROSEMIDE 20 MG/1
TABLET ORAL
Qty: 30 TABLET | Refills: 3 | Status: SHIPPED | OUTPATIENT
Start: 2020-10-26 | End: 2021-02-22

## 2020-11-10 DIAGNOSIS — E11.649 UNCONTROLLED TYPE 2 DIABETES MELLITUS WITH HYPOGLYCEMIA WITHOUT COMA (HCC): ICD-10-CM

## 2020-11-10 RX ORDER — LISINOPRIL 20 MG/1
TABLET ORAL
Qty: 180 TABLET | Refills: 1 | Status: SHIPPED | OUTPATIENT
Start: 2020-11-10 | End: 2021-05-11

## 2020-11-21 DIAGNOSIS — E11.42 DIABETIC PERIPHERAL NEUROPATHY (HCC): ICD-10-CM

## 2020-11-22 RX ORDER — GABAPENTIN 100 MG/1
CAPSULE ORAL
Qty: 90 CAPSULE | Refills: 3 | Status: SHIPPED | OUTPATIENT
Start: 2020-11-22 | End: 2021-03-23

## 2020-12-20 DIAGNOSIS — E78.2 MIXED HYPERLIPIDEMIA: ICD-10-CM

## 2020-12-21 RX ORDER — SIMVASTATIN 40 MG
TABLET ORAL
Qty: 30 TABLET | Refills: 3 | Status: SHIPPED | OUTPATIENT
Start: 2020-12-21 | End: 2021-05-11

## 2021-01-01 ENCOUNTER — NURSING HOME VISIT (OUTPATIENT)
Dept: WOUND CARE | Facility: HOSPITAL | Age: 81
End: 2021-01-01
Payer: COMMERCIAL

## 2021-01-01 ENCOUNTER — PATIENT OUTREACH (OUTPATIENT)
Dept: INTERNAL MEDICINE CLINIC | Facility: CLINIC | Age: 81
End: 2021-01-01

## 2021-01-01 ENCOUNTER — TELEPHONE (OUTPATIENT)
Dept: GASTROENTEROLOGY | Facility: CLINIC | Age: 81
End: 2021-01-01

## 2021-01-01 ENCOUNTER — TELEPHONE (OUTPATIENT)
Dept: OTHER | Facility: OTHER | Age: 81
End: 2021-01-01

## 2021-01-01 ENCOUNTER — DOCUMENTATION (OUTPATIENT)
Dept: INTERNAL MEDICINE CLINIC | Facility: CLINIC | Age: 81
End: 2021-01-01

## 2021-01-01 ENCOUNTER — TELEPHONE (OUTPATIENT)
Dept: INTERNAL MEDICINE CLINIC | Facility: CLINIC | Age: 81
End: 2021-01-01

## 2021-01-01 ENCOUNTER — APPOINTMENT (INPATIENT)
Dept: CT IMAGING | Facility: HOSPITAL | Age: 81
DRG: 378 | End: 2021-01-01
Payer: COMMERCIAL

## 2021-01-01 ENCOUNTER — NURSING HOME VISIT (OUTPATIENT)
Dept: GERIATRICS | Facility: OTHER | Age: 81
End: 2021-01-01
Payer: COMMERCIAL

## 2021-01-01 ENCOUNTER — APPOINTMENT (OUTPATIENT)
Dept: RADIOLOGY | Facility: CLINIC | Age: 81
End: 2021-01-01
Payer: COMMERCIAL

## 2021-01-01 ENCOUNTER — ANESTHESIA EVENT (INPATIENT)
Dept: GASTROENTEROLOGY | Facility: HOSPITAL | Age: 81
DRG: 378 | End: 2021-01-01
Payer: COMMERCIAL

## 2021-01-01 ENCOUNTER — OFFICE VISIT (OUTPATIENT)
Dept: OBGYN CLINIC | Facility: CLINIC | Age: 81
End: 2021-01-01

## 2021-01-01 ENCOUNTER — APPOINTMENT (EMERGENCY)
Dept: RADIOLOGY | Facility: HOSPITAL | Age: 81
DRG: 480 | End: 2021-01-01
Payer: COMMERCIAL

## 2021-01-01 ENCOUNTER — APPOINTMENT (EMERGENCY)
Dept: RADIOLOGY | Facility: HOSPITAL | Age: 81
DRG: 378 | End: 2021-01-01
Payer: COMMERCIAL

## 2021-01-01 ENCOUNTER — APPOINTMENT (EMERGENCY)
Dept: CT IMAGING | Facility: HOSPITAL | Age: 81
DRG: 378 | End: 2021-01-01
Payer: COMMERCIAL

## 2021-01-01 ENCOUNTER — RA CDI HCC (OUTPATIENT)
Dept: OTHER | Facility: HOSPITAL | Age: 81
End: 2021-01-01

## 2021-01-01 ENCOUNTER — ANESTHESIA EVENT (INPATIENT)
Dept: PERIOP | Facility: HOSPITAL | Age: 81
DRG: 480 | End: 2021-01-01
Payer: COMMERCIAL

## 2021-01-01 ENCOUNTER — ANESTHESIA EVENT (OUTPATIENT)
Dept: GASTROENTEROLOGY | Facility: HOSPITAL | Age: 81
DRG: 378 | End: 2021-01-01
Payer: COMMERCIAL

## 2021-01-01 ENCOUNTER — ANESTHESIA (INPATIENT)
Dept: PERIOP | Facility: HOSPITAL | Age: 81
DRG: 480 | End: 2021-01-01
Payer: COMMERCIAL

## 2021-01-01 ENCOUNTER — OFFICE VISIT (OUTPATIENT)
Dept: INTERNAL MEDICINE CLINIC | Facility: CLINIC | Age: 81
End: 2021-01-01
Payer: COMMERCIAL

## 2021-01-01 ENCOUNTER — HOSPITAL ENCOUNTER (OUTPATIENT)
Facility: HOSPITAL | Age: 81
Setting detail: OBSERVATION
Discharge: NURSING, REHAB OR LONG-TERM CARE | End: 2021-08-18
Attending: EMERGENCY MEDICINE | Admitting: STUDENT IN AN ORGANIZED HEALTH CARE EDUCATION/TRAINING PROGRAM
Payer: COMMERCIAL

## 2021-01-01 ENCOUNTER — PATIENT OUTREACH (OUTPATIENT)
Dept: CASE MANAGEMENT | Facility: OTHER | Age: 81
End: 2021-01-01

## 2021-01-01 ENCOUNTER — HOSPITAL ENCOUNTER (INPATIENT)
Facility: HOSPITAL | Age: 81
LOS: 9 days | Discharge: NURSING, REHAB OR LONG-TERM CARE | DRG: 480 | End: 2021-10-07
Attending: EMERGENCY MEDICINE | Admitting: INTERNAL MEDICINE
Payer: COMMERCIAL

## 2021-01-01 ENCOUNTER — TELEPHONE (OUTPATIENT)
Dept: OBGYN CLINIC | Facility: HOSPITAL | Age: 81
End: 2021-01-01

## 2021-01-01 ENCOUNTER — ANESTHESIA (OUTPATIENT)
Dept: GASTROENTEROLOGY | Facility: HOSPITAL | Age: 81
DRG: 378 | End: 2021-01-01
Payer: COMMERCIAL

## 2021-01-01 ENCOUNTER — HOSPITAL ENCOUNTER (OUTPATIENT)
Dept: ULTRASOUND IMAGING | Facility: CLINIC | Age: 81
Discharge: HOME | End: 2021-12-29
Payer: COMMERCIAL

## 2021-01-01 ENCOUNTER — APPOINTMENT (INPATIENT)
Dept: CT IMAGING | Facility: HOSPITAL | Age: 81
DRG: 480 | End: 2021-01-01
Payer: COMMERCIAL

## 2021-01-01 ENCOUNTER — HOSPITAL ENCOUNTER (INPATIENT)
Facility: HOSPITAL | Age: 81
LOS: 1 days | Discharge: NURSING, REHAB OR LONG-TERM CARE | DRG: 378 | End: 2021-08-22
Attending: EMERGENCY MEDICINE | Admitting: INTERNAL MEDICINE
Payer: COMMERCIAL

## 2021-01-01 ENCOUNTER — APPOINTMENT (INPATIENT)
Dept: VASCULAR ULTRASOUND | Facility: HOSPITAL | Age: 81
DRG: 480 | End: 2021-01-01
Payer: COMMERCIAL

## 2021-01-01 ENCOUNTER — OFFICE VISIT (OUTPATIENT)
Dept: GASTROENTEROLOGY | Facility: CLINIC | Age: 81
End: 2021-01-01
Payer: COMMERCIAL

## 2021-01-01 ENCOUNTER — APPOINTMENT (OUTPATIENT)
Dept: GASTROENTEROLOGY | Facility: HOSPITAL | Age: 81
DRG: 378 | End: 2021-01-01
Payer: COMMERCIAL

## 2021-01-01 ENCOUNTER — HOSPITAL ENCOUNTER (INPATIENT)
Facility: HOSPITAL | Age: 81
LOS: 2 days | Discharge: NURSING, REHAB OR LONG-TERM CARE | DRG: 378 | End: 2021-08-14
Attending: EMERGENCY MEDICINE | Admitting: STUDENT IN AN ORGANIZED HEALTH CARE EDUCATION/TRAINING PROGRAM
Payer: COMMERCIAL

## 2021-01-01 ENCOUNTER — APPOINTMENT (INPATIENT)
Dept: GASTROENTEROLOGY | Facility: HOSPITAL | Age: 81
DRG: 378 | End: 2021-01-01
Payer: COMMERCIAL

## 2021-01-01 ENCOUNTER — TELEPHONE (OUTPATIENT)
Dept: VASCULAR SURGERY | Facility: CLINIC | Age: 81
End: 2021-01-01

## 2021-01-01 ENCOUNTER — HOSPITAL ENCOUNTER (OUTPATIENT)
Dept: MAMMOGRAPHY | Facility: CLINIC | Age: 81
Discharge: HOME | End: 2021-12-29
Payer: COMMERCIAL

## 2021-01-01 ENCOUNTER — APPOINTMENT (INPATIENT)
Dept: RADIOLOGY | Facility: HOSPITAL | Age: 81
DRG: 480 | End: 2021-01-01
Payer: COMMERCIAL

## 2021-01-01 ENCOUNTER — APPOINTMENT (INPATIENT)
Dept: ULTRASOUND IMAGING | Facility: HOSPITAL | Age: 81
DRG: 480 | End: 2021-01-01
Payer: COMMERCIAL

## 2021-01-01 ENCOUNTER — ANESTHESIA (INPATIENT)
Dept: GASTROENTEROLOGY | Facility: HOSPITAL | Age: 81
DRG: 378 | End: 2021-01-01
Payer: COMMERCIAL

## 2021-01-01 ENCOUNTER — APPOINTMENT (EMERGENCY)
Dept: CT IMAGING | Facility: HOSPITAL | Age: 81
DRG: 480 | End: 2021-01-01
Payer: COMMERCIAL

## 2021-01-01 VITALS
HEART RATE: 62 BPM | RESPIRATION RATE: 17 BRPM | WEIGHT: 142.64 LBS | OXYGEN SATURATION: 99 % | SYSTOLIC BLOOD PRESSURE: 123 MMHG | BODY MASS INDEX: 28.76 KG/M2 | HEIGHT: 59 IN | DIASTOLIC BLOOD PRESSURE: 52 MMHG | TEMPERATURE: 98.4 F

## 2021-01-01 VITALS — RESPIRATION RATE: 18 BRPM | HEART RATE: 60 BPM | SYSTOLIC BLOOD PRESSURE: 152 MMHG | DIASTOLIC BLOOD PRESSURE: 69 MMHG

## 2021-01-01 VITALS
HEART RATE: 64 BPM | BODY MASS INDEX: 24.92 KG/M2 | HEIGHT: 61 IN | WEIGHT: 132 LBS | SYSTOLIC BLOOD PRESSURE: 152 MMHG | DIASTOLIC BLOOD PRESSURE: 82 MMHG | OXYGEN SATURATION: 99 % | TEMPERATURE: 98.3 F | RESPIRATION RATE: 16 BRPM

## 2021-01-01 VITALS — HEIGHT: 59 IN | BODY MASS INDEX: 26.61 KG/M2 | WEIGHT: 132 LBS

## 2021-01-01 VITALS
HEART RATE: 64 BPM | HEIGHT: 59 IN | OXYGEN SATURATION: 98 % | WEIGHT: 130.07 LBS | SYSTOLIC BLOOD PRESSURE: 156 MMHG | BODY MASS INDEX: 26.22 KG/M2 | TEMPERATURE: 99.3 F | DIASTOLIC BLOOD PRESSURE: 51 MMHG | RESPIRATION RATE: 18 BRPM

## 2021-01-01 VITALS
HEART RATE: 71 BPM | OXYGEN SATURATION: 98 % | TEMPERATURE: 99.1 F | DIASTOLIC BLOOD PRESSURE: 78 MMHG | SYSTOLIC BLOOD PRESSURE: 140 MMHG

## 2021-01-01 VITALS — SYSTOLIC BLOOD PRESSURE: 118 MMHG | DIASTOLIC BLOOD PRESSURE: 70 MMHG | HEART RATE: 80 BPM

## 2021-01-01 VITALS
SYSTOLIC BLOOD PRESSURE: 165 MMHG | HEIGHT: 59 IN | BODY MASS INDEX: 25.29 KG/M2 | WEIGHT: 125.44 LBS | DIASTOLIC BLOOD PRESSURE: 71 MMHG | TEMPERATURE: 98.4 F | RESPIRATION RATE: 18 BRPM | OXYGEN SATURATION: 100 % | HEART RATE: 60 BPM

## 2021-01-01 VITALS
BODY MASS INDEX: 25.29 KG/M2 | RESPIRATION RATE: 18 BRPM | SYSTOLIC BLOOD PRESSURE: 129 MMHG | TEMPERATURE: 97.9 F | WEIGHT: 125.44 LBS | DIASTOLIC BLOOD PRESSURE: 51 MMHG | OXYGEN SATURATION: 98 % | HEIGHT: 59 IN | HEART RATE: 62 BPM

## 2021-01-01 VITALS — DIASTOLIC BLOOD PRESSURE: 65 MMHG | SYSTOLIC BLOOD PRESSURE: 174 MMHG | RESPIRATION RATE: 24 BRPM | HEART RATE: 70 BPM

## 2021-01-01 DIAGNOSIS — L89.152 PRESSURE INJURY OF SACRAL REGION, STAGE 2 (HCC): ICD-10-CM

## 2021-01-01 DIAGNOSIS — R53.1 GENERALIZED WEAKNESS: ICD-10-CM

## 2021-01-01 DIAGNOSIS — T14.8XXA SURGICAL WOUND PRESENT: Primary | ICD-10-CM

## 2021-01-01 DIAGNOSIS — Z48.89 AFTERCARE FOLLOWING SURGERY: ICD-10-CM

## 2021-01-01 DIAGNOSIS — I73.9 PAD (PERIPHERAL ARTERY DISEASE) (HCC): ICD-10-CM

## 2021-01-01 DIAGNOSIS — R32 URINARY INCONTINENCE IN FEMALE: ICD-10-CM

## 2021-01-01 DIAGNOSIS — I48.20 CHRONIC ATRIAL FIBRILLATION (HCC): ICD-10-CM

## 2021-01-01 DIAGNOSIS — D62 ACUTE ON CHRONIC BLOOD LOSS ANEMIA: ICD-10-CM

## 2021-01-01 DIAGNOSIS — R53.81 DEBILITY: ICD-10-CM

## 2021-01-01 DIAGNOSIS — F41.9 ANXIETY AND DEPRESSION: ICD-10-CM

## 2021-01-01 DIAGNOSIS — F32.A ANXIETY AND DEPRESSION: ICD-10-CM

## 2021-01-01 DIAGNOSIS — R26.2 AMBULATORY DYSFUNCTION: Primary | ICD-10-CM

## 2021-01-01 DIAGNOSIS — L98.491 TRAUMATIC SKIN ULCER, LIMITED TO BREAKDOWN OF SKIN (HCC): ICD-10-CM

## 2021-01-01 DIAGNOSIS — T14.8XXA DEEP TISSUE INJURY: ICD-10-CM

## 2021-01-01 DIAGNOSIS — N17.9 ACUTE KIDNEY INJURY (HCC): ICD-10-CM

## 2021-01-01 DIAGNOSIS — Z87.81 S/P LEFT HIP FRACTURE: Primary | ICD-10-CM

## 2021-01-01 DIAGNOSIS — M25.551 PAIN OF RIGHT HIP JOINT: ICD-10-CM

## 2021-01-01 DIAGNOSIS — N30.01 ACUTE CYSTITIS WITH HEMATURIA: ICD-10-CM

## 2021-01-01 DIAGNOSIS — L89.621 PRESSURE INJURY OF LEFT HEEL, STAGE 1: ICD-10-CM

## 2021-01-01 DIAGNOSIS — R77.8 ELEVATED TROPONIN: ICD-10-CM

## 2021-01-01 DIAGNOSIS — D64.9 ANEMIA: Primary | ICD-10-CM

## 2021-01-01 DIAGNOSIS — E11.42 DIABETIC PERIPHERAL NEUROPATHY (HCC): ICD-10-CM

## 2021-01-01 DIAGNOSIS — R91.8 MASS OF UPPER LOBE OF RIGHT LUNG: ICD-10-CM

## 2021-01-01 DIAGNOSIS — Z98.890 S/P ORIF (OPEN REDUCTION INTERNAL FIXATION) FRACTURE: ICD-10-CM

## 2021-01-01 DIAGNOSIS — I10 ESSENTIAL HYPERTENSION: ICD-10-CM

## 2021-01-01 DIAGNOSIS — K25.4 GASTROINTESTINAL HEMORRHAGE ASSOCIATED WITH GASTRIC ULCER: ICD-10-CM

## 2021-01-01 DIAGNOSIS — Z12.31 ENCOUNTER FOR SCREENING MAMMOGRAM FOR BREAST CANCER: ICD-10-CM

## 2021-01-01 DIAGNOSIS — Z71.89 COMPLEX CARE COORDINATION: Primary | ICD-10-CM

## 2021-01-01 DIAGNOSIS — R26.81 UNSTEADY GAIT: ICD-10-CM

## 2021-01-01 DIAGNOSIS — Z98.890 S/P ORIF (OPEN REDUCTION INTERNAL FIXATION) FRACTURE: Primary | ICD-10-CM

## 2021-01-01 DIAGNOSIS — Z87.81 S/P ORIF (OPEN REDUCTION INTERNAL FIXATION) FRACTURE: Primary | ICD-10-CM

## 2021-01-01 DIAGNOSIS — S72.145D CLOSED NONDISPLACED INTERTROCHANTERIC FRACTURE OF LEFT FEMUR WITH ROUTINE HEALING, SUBSEQUENT ENCOUNTER: Primary | ICD-10-CM

## 2021-01-01 DIAGNOSIS — S72.145D CLOSED NONDISPLACED INTERTROCHANTERIC FRACTURE OF LEFT FEMUR WITH ROUTINE HEALING, SUBSEQUENT ENCOUNTER: ICD-10-CM

## 2021-01-01 DIAGNOSIS — G62.9 PERIPHERAL POLYNEUROPATHY: ICD-10-CM

## 2021-01-01 DIAGNOSIS — R26.2 AMBULATORY DYSFUNCTION: ICD-10-CM

## 2021-01-01 DIAGNOSIS — S72.002A CLOSED FRACTURE OF LEFT HIP, INITIAL ENCOUNTER (HCC): ICD-10-CM

## 2021-01-01 DIAGNOSIS — K92.2 GI BLEED: Primary | ICD-10-CM

## 2021-01-01 DIAGNOSIS — L89.301 PRESSURE INJURY OF BUTTOCK, STAGE 1, UNSPECIFIED LATERALITY: ICD-10-CM

## 2021-01-01 DIAGNOSIS — S72.142A CLOSED INTERTROCHANTERIC FRACTURE OF LEFT FEMUR (HCC): ICD-10-CM

## 2021-01-01 DIAGNOSIS — Z79.4 TYPE 2 DIABETES MELLITUS WITH OTHER SPECIFIED COMPLICATION, WITH LONG-TERM CURRENT USE OF INSULIN (HCC): ICD-10-CM

## 2021-01-01 DIAGNOSIS — E11.69 TYPE 2 DIABETES MELLITUS WITH OTHER SPECIFIED COMPLICATION, WITH LONG-TERM CURRENT USE OF INSULIN (HCC): ICD-10-CM

## 2021-01-01 DIAGNOSIS — I10 ESSENTIAL HYPERTENSION: Primary | ICD-10-CM

## 2021-01-01 DIAGNOSIS — E11.649 UNCONTROLLED TYPE 2 DIABETES MELLITUS WITH HYPOGLYCEMIA WITHOUT COMA (HCC): ICD-10-CM

## 2021-01-01 DIAGNOSIS — N63.0 BREAST LUMP: ICD-10-CM

## 2021-01-01 DIAGNOSIS — E87.5 HYPERKALEMIA: ICD-10-CM

## 2021-01-01 DIAGNOSIS — E87.1 HYPONATREMIA: ICD-10-CM

## 2021-01-01 DIAGNOSIS — R68.89 ACTIVITY INTOLERANCE: ICD-10-CM

## 2021-01-01 DIAGNOSIS — N17.9 AKI (ACUTE KIDNEY INJURY) (HCC): ICD-10-CM

## 2021-01-01 DIAGNOSIS — T82.898D FEMORAL-POPLITEAL BYPASS GRAFT OCCLUSION, RIGHT, SUBSEQUENT ENCOUNTER: ICD-10-CM

## 2021-01-01 DIAGNOSIS — K92.2 GI BLEED: ICD-10-CM

## 2021-01-01 DIAGNOSIS — Z87.81 S/P ORIF (OPEN REDUCTION INTERNAL FIXATION) FRACTURE: ICD-10-CM

## 2021-01-01 DIAGNOSIS — K62.5 RECTAL BLEEDING: Primary | ICD-10-CM

## 2021-01-01 DIAGNOSIS — D64.9 ANEMIA: ICD-10-CM

## 2021-01-01 DIAGNOSIS — K25.4 GASTROINTESTINAL HEMORRHAGE ASSOCIATED WITH GASTRIC ULCER: Primary | ICD-10-CM

## 2021-01-01 DIAGNOSIS — D50.9 IRON DEFICIENCY ANEMIA, UNSPECIFIED IRON DEFICIENCY ANEMIA TYPE: ICD-10-CM

## 2021-01-01 DIAGNOSIS — N39.0 URINARY TRACT INFECTION: ICD-10-CM

## 2021-01-01 DIAGNOSIS — R53.81 DEBILITY: Primary | ICD-10-CM

## 2021-01-01 DIAGNOSIS — J44.9 CHRONIC OBSTRUCTIVE PULMONARY DISEASE, UNSPECIFIED COPD TYPE (HCC): ICD-10-CM

## 2021-01-01 DIAGNOSIS — Z95.0 PACEMAKER: ICD-10-CM

## 2021-01-01 DIAGNOSIS — K92.2 GASTROINTESTINAL HEMORRHAGE, UNSPECIFIED GASTROINTESTINAL HEMORRHAGE TYPE: ICD-10-CM

## 2021-01-01 DIAGNOSIS — Z12.39 ENCOUNTER FOR BREAST CANCER SCREENING USING NON-MAMMOGRAM MODALITY: ICD-10-CM

## 2021-01-01 DIAGNOSIS — G93.40 ACUTE ENCEPHALOPATHY: ICD-10-CM

## 2021-01-01 DIAGNOSIS — T82.898D FEMORAL-POPLITEAL BYPASS GRAFT OCCLUSION, RIGHT, SUBSEQUENT ENCOUNTER: Primary | ICD-10-CM

## 2021-01-01 DIAGNOSIS — D64.9 ANEMIA REQUIRING TRANSFUSIONS: ICD-10-CM

## 2021-01-01 DIAGNOSIS — E11.42 DIABETIC PERIPHERAL NEUROPATHY (HCC): Primary | ICD-10-CM

## 2021-01-01 DIAGNOSIS — L89.302 PRESSURE INJURY OF BUTTOCK, STAGE 2, UNSPECIFIED LATERALITY (HCC): ICD-10-CM

## 2021-01-01 DIAGNOSIS — R33.9 URINE RETENTION: ICD-10-CM

## 2021-01-01 DIAGNOSIS — E78.2 MIXED HYPERLIPIDEMIA: ICD-10-CM

## 2021-01-01 DIAGNOSIS — S72.002A CLOSED FRACTURE OF LEFT HIP, INITIAL ENCOUNTER (HCC): Primary | ICD-10-CM

## 2021-01-01 DIAGNOSIS — Z12.39 ENCOUNTER FOR BREAST CANCER SCREENING USING NON-MAMMOGRAM MODALITY: Primary | ICD-10-CM

## 2021-01-01 DIAGNOSIS — J44.9 CHRONIC OBSTRUCTIVE PULMONARY DISEASE, UNSPECIFIED COPD TYPE (HCC): Primary | ICD-10-CM

## 2021-01-01 LAB
ABO GROUP BLD BPU: NORMAL
ABO GROUP BLD: NORMAL
ALBUMIN SERPL BCP-MCNC: 2.5 G/DL (ref 3.5–5)
ALBUMIN SERPL BCP-MCNC: 2.6 G/DL (ref 3.5–5)
ALBUMIN SERPL BCP-MCNC: 2.6 G/DL (ref 3.5–5)
ALBUMIN SERPL BCP-MCNC: 2.7 G/DL (ref 3.5–5)
ALBUMIN SERPL BCP-MCNC: 3.3 G/DL (ref 3.5–5)
ALP SERPL-CCNC: 104 U/L (ref 46–116)
ALP SERPL-CCNC: 52 U/L (ref 46–116)
ALP SERPL-CCNC: 54 U/L (ref 46–116)
ALP SERPL-CCNC: 54 U/L (ref 46–116)
ALP SERPL-CCNC: 65 U/L (ref 46–116)
ALT SERPL W P-5'-P-CCNC: 12 U/L (ref 12–78)
ALT SERPL W P-5'-P-CCNC: 15 U/L (ref 12–78)
ALT SERPL W P-5'-P-CCNC: 17 U/L (ref 12–78)
ALT SERPL W P-5'-P-CCNC: 25 U/L (ref 12–78)
ALT SERPL W P-5'-P-CCNC: 9 U/L (ref 12–78)
AMMONIA PLAS-SCNC: <10 UMOL/L (ref 11–35)
AMORPH URATE CRY URNS QL MICRO: ABNORMAL /HPF
ANION GAP SERPL CALCULATED.3IONS-SCNC: 11 MMOL/L (ref 4–13)
ANION GAP SERPL CALCULATED.3IONS-SCNC: 12 MMOL/L (ref 4–13)
ANION GAP SERPL CALCULATED.3IONS-SCNC: 14 MMOL/L (ref 4–13)
ANION GAP SERPL CALCULATED.3IONS-SCNC: 15 MMOL/L (ref 4–13)
ANION GAP SERPL CALCULATED.3IONS-SCNC: 15 MMOL/L (ref 4–13)
ANION GAP SERPL CALCULATED.3IONS-SCNC: 16 MMOL/L (ref 4–13)
ANION GAP SERPL CALCULATED.3IONS-SCNC: 6 MMOL/L (ref 4–13)
ANION GAP SERPL CALCULATED.3IONS-SCNC: 6 MMOL/L (ref 4–13)
ANION GAP SERPL CALCULATED.3IONS-SCNC: 7 MMOL/L (ref 4–13)
ANION GAP SERPL CALCULATED.3IONS-SCNC: 8 MMOL/L (ref 4–13)
ANION GAP SERPL CALCULATED.3IONS-SCNC: 9 MMOL/L (ref 4–13)
ANISOCYTOSIS BLD QL SMEAR: PRESENT
APTT PPP: 27 SECONDS (ref 23–37)
APTT PPP: 30 SECONDS (ref 23–37)
ARTERIAL PATENCY WRIST A: YES
AST SERPL W P-5'-P-CCNC: 10 U/L (ref 5–45)
AST SERPL W P-5'-P-CCNC: 13 U/L (ref 5–45)
AST SERPL W P-5'-P-CCNC: 14 U/L (ref 5–45)
AST SERPL W P-5'-P-CCNC: 16 U/L (ref 5–45)
AST SERPL W P-5'-P-CCNC: 23 U/L (ref 5–45)
ATRIAL RATE: 39 BPM
ATRIAL RATE: 60 BPM
ATRIAL RATE: 60 BPM
ATRIAL RATE: 65 BPM
ATRIAL RATE: 72 BPM
ATRIAL RATE: 73 BPM
ATRIAL RATE: 73 BPM
ATRIAL RATE: 80 BPM
BACTERIA BLD CULT: NORMAL
BACTERIA BLD CULT: NORMAL
BACTERIA UR CULT: ABNORMAL
BACTERIA UR CULT: ABNORMAL
BACTERIA UR QL AUTO: ABNORMAL /HPF
BASE EXCESS BLDA CALC-SCNC: -3.2 MMOL/L
BASOPHILS # BLD MANUAL: 0 THOUSAND/UL (ref 0–0.1)
BASOPHILS # BLD MANUAL: 0.04 THOUSAND/UL (ref 0–0.1)
BASOPHILS # BLD MANUAL: 0.04 THOUSAND/UL (ref 0–0.1)
BASOPHILS # BLD MANUAL: 0.05 THOUSAND/UL (ref 0–0.1)
BASOPHILS NFR MAR MANUAL: 0 % (ref 0–1)
BASOPHILS NFR MAR MANUAL: 1 % (ref 0–1)
BILIRUB DIRECT SERPL-MCNC: 0.18 MG/DL (ref 0–0.2)
BILIRUB SERPL-MCNC: 0.48 MG/DL (ref 0.2–1)
BILIRUB SERPL-MCNC: 0.48 MG/DL (ref 0.2–1)
BILIRUB SERPL-MCNC: 0.55 MG/DL (ref 0.2–1)
BILIRUB SERPL-MCNC: 0.61 MG/DL (ref 0.2–1)
BILIRUB SERPL-MCNC: 1 MG/DL (ref 0.2–1)
BILIRUB UR QL STRIP: NEGATIVE
BLD GP AB SCN SERPL QL: NEGATIVE
BPU ID: NORMAL
BUN SERPL-MCNC: 107 MG/DL (ref 5–25)
BUN SERPL-MCNC: 108 MG/DL (ref 5–25)
BUN SERPL-MCNC: 25 MG/DL (ref 5–25)
BUN SERPL-MCNC: 30 MG/DL (ref 5–25)
BUN SERPL-MCNC: 35 MG/DL (ref 5–25)
BUN SERPL-MCNC: 36 MG/DL (ref 5–25)
BUN SERPL-MCNC: 38 MG/DL (ref 5–25)
BUN SERPL-MCNC: 39 MG/DL (ref 5–25)
BUN SERPL-MCNC: 41 MG/DL (ref 5–25)
BUN SERPL-MCNC: 46 MG/DL (ref 5–25)
BUN SERPL-MCNC: 46 MG/DL (ref 5–25)
BUN SERPL-MCNC: 61 MG/DL (ref 5–25)
BUN SERPL-MCNC: 69 MG/DL (ref 5–25)
BUN SERPL-MCNC: 74 MG/DL (ref 5–25)
BUN SERPL-MCNC: 83 MG/DL (ref 5–25)
BUN SERPL-MCNC: 88 MG/DL (ref 5–25)
BUN SERPL-MCNC: 90 MG/DL (ref 5–25)
BUN SERPL-MCNC: 92 MG/DL (ref 5–25)
BUN SERPL-MCNC: 94 MG/DL (ref 5–25)
BUN SERPL-MCNC: 95 MG/DL (ref 5–25)
CALCIUM ALBUM COR SERPL-MCNC: 9 MG/DL (ref 8.3–10.1)
CALCIUM ALBUM COR SERPL-MCNC: 9.2 MG/DL (ref 8.3–10.1)
CALCIUM ALBUM COR SERPL-MCNC: 9.3 MG/DL (ref 8.3–10.1)
CALCIUM ALBUM COR SERPL-MCNC: 9.4 MG/DL (ref 8.3–10.1)
CALCIUM SERPL-MCNC: 7.1 MG/DL (ref 8.3–10.1)
CALCIUM SERPL-MCNC: 7.1 MG/DL (ref 8.3–10.1)
CALCIUM SERPL-MCNC: 7.4 MG/DL (ref 8.3–10.1)
CALCIUM SERPL-MCNC: 7.5 MG/DL (ref 8.3–10.1)
CALCIUM SERPL-MCNC: 7.8 MG/DL (ref 8.3–10.1)
CALCIUM SERPL-MCNC: 7.9 MG/DL (ref 8.3–10.1)
CALCIUM SERPL-MCNC: 7.9 MG/DL (ref 8.3–10.1)
CALCIUM SERPL-MCNC: 8 MG/DL (ref 8.3–10.1)
CALCIUM SERPL-MCNC: 8.1 MG/DL (ref 8.3–10.1)
CALCIUM SERPL-MCNC: 8.2 MG/DL (ref 8.3–10.1)
CALCIUM SERPL-MCNC: 8.2 MG/DL (ref 8.3–10.1)
CALCIUM SERPL-MCNC: 8.3 MG/DL (ref 8.3–10.1)
CALCIUM SERPL-MCNC: 8.7 MG/DL (ref 8.3–10.1)
CHLORIDE SERPL-SCNC: 100 MMOL/L (ref 100–108)
CHLORIDE SERPL-SCNC: 101 MMOL/L (ref 100–108)
CHLORIDE SERPL-SCNC: 102 MMOL/L (ref 100–108)
CHLORIDE SERPL-SCNC: 103 MMOL/L (ref 100–108)
CHLORIDE SERPL-SCNC: 103 MMOL/L (ref 100–108)
CHLORIDE SERPL-SCNC: 88 MMOL/L (ref 100–108)
CHLORIDE SERPL-SCNC: 88 MMOL/L (ref 100–108)
CHLORIDE SERPL-SCNC: 89 MMOL/L (ref 100–108)
CHLORIDE SERPL-SCNC: 89 MMOL/L (ref 100–108)
CHLORIDE SERPL-SCNC: 90 MMOL/L (ref 100–108)
CHLORIDE SERPL-SCNC: 91 MMOL/L (ref 100–108)
CHLORIDE SERPL-SCNC: 92 MMOL/L (ref 100–108)
CHLORIDE SERPL-SCNC: 94 MMOL/L (ref 100–108)
CHLORIDE SERPL-SCNC: 95 MMOL/L (ref 100–108)
CHLORIDE SERPL-SCNC: 96 MMOL/L (ref 100–108)
CHLORIDE SERPL-SCNC: 99 MMOL/L (ref 100–108)
CHLORIDE SERPL-SCNC: 99 MMOL/L (ref 100–108)
CHLORIDE UR-SCNC: 14 MMOL/L
CLARITY UR: ABNORMAL
CLARITY UR: CLEAR
CLARITY UR: CLEAR
CO2 SERPL-SCNC: 19 MMOL/L (ref 21–32)
CO2 SERPL-SCNC: 20 MMOL/L (ref 21–32)
CO2 SERPL-SCNC: 21 MMOL/L (ref 21–32)
CO2 SERPL-SCNC: 22 MMOL/L (ref 21–32)
CO2 SERPL-SCNC: 23 MMOL/L (ref 21–32)
CO2 SERPL-SCNC: 24 MMOL/L (ref 21–32)
CO2 SERPL-SCNC: 25 MMOL/L (ref 21–32)
CO2 SERPL-SCNC: 27 MMOL/L (ref 21–32)
CO2 SERPL-SCNC: 27 MMOL/L (ref 21–32)
CO2 SERPL-SCNC: 28 MMOL/L (ref 21–32)
CO2 SERPL-SCNC: 29 MMOL/L (ref 21–32)
CO2 SERPL-SCNC: 30 MMOL/L (ref 21–32)
CO2 SERPL-SCNC: 31 MMOL/L (ref 21–32)
COARSE GRAN CASTS URNS QL MICRO: ABNORMAL /LPF
COLOR UR: ABNORMAL
COLOR UR: YELLOW
COLOR UR: YELLOW
CORTIS SERPL-MCNC: 39.2 UG/DL
CREAT SERPL-MCNC: 0.74 MG/DL (ref 0.6–1.3)
CREAT SERPL-MCNC: 0.77 MG/DL (ref 0.6–1.3)
CREAT SERPL-MCNC: 0.78 MG/DL (ref 0.6–1.3)
CREAT SERPL-MCNC: 0.88 MG/DL (ref 0.6–1.3)
CREAT SERPL-MCNC: 0.95 MG/DL (ref 0.6–1.3)
CREAT SERPL-MCNC: 0.98 MG/DL (ref 0.6–1.3)
CREAT SERPL-MCNC: 0.99 MG/DL (ref 0.6–1.3)
CREAT SERPL-MCNC: 1.02 MG/DL (ref 0.6–1.3)
CREAT SERPL-MCNC: 1.13 MG/DL (ref 0.6–1.3)
CREAT SERPL-MCNC: 1.94 MG/DL (ref 0.6–1.3)
CREAT SERPL-MCNC: 2.05 MG/DL (ref 0.6–1.3)
CREAT SERPL-MCNC: 2.27 MG/DL (ref 0.6–1.3)
CREAT SERPL-MCNC: 2.56 MG/DL (ref 0.6–1.3)
CREAT SERPL-MCNC: 2.65 MG/DL (ref 0.6–1.3)
CREAT SERPL-MCNC: 2.87 MG/DL (ref 0.6–1.3)
CREAT SERPL-MCNC: 2.98 MG/DL (ref 0.6–1.3)
CREAT SERPL-MCNC: 2.99 MG/DL (ref 0.6–1.3)
CREAT SERPL-MCNC: 3.04 MG/DL (ref 0.6–1.3)
CREAT SERPL-MCNC: 3.08 MG/DL (ref 0.6–1.3)
CREAT SERPL-MCNC: 3.14 MG/DL (ref 0.6–1.3)
CREAT UR-MCNC: 83.1 MG/DL
CROSSMATCH: NORMAL
EOSINOPHIL # BLD MANUAL: 0 THOUSAND/UL (ref 0–0.4)
EOSINOPHIL # BLD MANUAL: 0.04 THOUSAND/UL (ref 0–0.4)
EOSINOPHIL # BLD MANUAL: 0.08 THOUSAND/UL (ref 0–0.4)
EOSINOPHIL # BLD MANUAL: 0.1 THOUSAND/UL (ref 0–0.4)
EOSINOPHIL NFR BLD MANUAL: 0 % (ref 0–6)
EOSINOPHIL NFR BLD MANUAL: 1 % (ref 0–6)
EOSINOPHIL NFR BLD MANUAL: 1 % (ref 0–6)
EOSINOPHIL NFR BLD MANUAL: 2 % (ref 0–6)
ERYTHROCYTE [DISTWIDTH] IN BLOOD BY AUTOMATED COUNT: 14.1 % (ref 11.6–15.1)
ERYTHROCYTE [DISTWIDTH] IN BLOOD BY AUTOMATED COUNT: 14.3 % (ref 11.6–15.1)
ERYTHROCYTE [DISTWIDTH] IN BLOOD BY AUTOMATED COUNT: 14.4 % (ref 11.6–15.1)
ERYTHROCYTE [DISTWIDTH] IN BLOOD BY AUTOMATED COUNT: 14.6 % (ref 11.6–15.1)
ERYTHROCYTE [DISTWIDTH] IN BLOOD BY AUTOMATED COUNT: 14.7 % (ref 11.6–15.1)
ERYTHROCYTE [DISTWIDTH] IN BLOOD BY AUTOMATED COUNT: 14.7 % (ref 11.6–15.1)
ERYTHROCYTE [DISTWIDTH] IN BLOOD BY AUTOMATED COUNT: 14.8 % (ref 11.6–15.1)
ERYTHROCYTE [DISTWIDTH] IN BLOOD BY AUTOMATED COUNT: 15.4 % (ref 11.6–15.1)
ERYTHROCYTE [DISTWIDTH] IN BLOOD BY AUTOMATED COUNT: 15.9 % (ref 11.6–15.1)
ERYTHROCYTE [DISTWIDTH] IN BLOOD BY AUTOMATED COUNT: 16.3 % (ref 11.6–15.1)
ERYTHROCYTE [DISTWIDTH] IN BLOOD BY AUTOMATED COUNT: 16.6 % (ref 11.6–15.1)
ERYTHROCYTE [DISTWIDTH] IN BLOOD BY AUTOMATED COUNT: 16.7 % (ref 11.6–15.1)
ERYTHROCYTE [DISTWIDTH] IN BLOOD BY AUTOMATED COUNT: 16.8 % (ref 11.6–15.1)
ERYTHROCYTE [DISTWIDTH] IN BLOOD BY AUTOMATED COUNT: 16.9 % (ref 11.6–15.1)
ERYTHROCYTE [DISTWIDTH] IN BLOOD BY AUTOMATED COUNT: 17 % (ref 11.6–15.1)
ERYTHROCYTE [DISTWIDTH] IN BLOOD BY AUTOMATED COUNT: 17 % (ref 11.6–15.1)
ERYTHROCYTE [DISTWIDTH] IN BLOOD BY AUTOMATED COUNT: 17.2 % (ref 11.6–15.1)
ERYTHROCYTE [DISTWIDTH] IN BLOOD BY AUTOMATED COUNT: 17.2 % (ref 11.6–15.1)
ERYTHROCYTE [DISTWIDTH] IN BLOOD BY AUTOMATED COUNT: 18.2 % (ref 11.6–15.1)
ERYTHROCYTE [DISTWIDTH] IN BLOOD BY AUTOMATED COUNT: 18.8 % (ref 11.6–15.1)
EST. AVERAGE GLUCOSE BLD GHB EST-MCNC: 143 MG/DL
FERRITIN SERPL-MCNC: 4963 NG/ML (ref 8–388)
FINE GRAN CASTS URNS QL MICRO: ABNORMAL /LPF
FOLATE SERPL-MCNC: >20 NG/ML (ref 3.1–17.5)
GFR SERPL CREATININE-BSD FRML MDRD: 13 ML/MIN/1.73SQ M
GFR SERPL CREATININE-BSD FRML MDRD: 13 ML/MIN/1.73SQ M
GFR SERPL CREATININE-BSD FRML MDRD: 14 ML/MIN/1.73SQ M
GFR SERPL CREATININE-BSD FRML MDRD: 15 ML/MIN/1.73SQ M
GFR SERPL CREATININE-BSD FRML MDRD: 16 ML/MIN/1.73SQ M
GFR SERPL CREATININE-BSD FRML MDRD: 17 ML/MIN/1.73SQ M
GFR SERPL CREATININE-BSD FRML MDRD: 20 ML/MIN/1.73SQ M
GFR SERPL CREATININE-BSD FRML MDRD: 22 ML/MIN/1.73SQ M
GFR SERPL CREATININE-BSD FRML MDRD: 24 ML/MIN/1.73SQ M
GFR SERPL CREATININE-BSD FRML MDRD: 45 ML/MIN/1.73SQ M
GFR SERPL CREATININE-BSD FRML MDRD: 51 ML/MIN/1.73SQ M
GFR SERPL CREATININE-BSD FRML MDRD: 53 ML/MIN/1.73SQ M
GFR SERPL CREATININE-BSD FRML MDRD: 54 ML/MIN/1.73SQ M
GFR SERPL CREATININE-BSD FRML MDRD: 56 ML/MIN/1.73SQ M
GFR SERPL CREATININE-BSD FRML MDRD: 61 ML/MIN/1.73SQ M
GFR SERPL CREATININE-BSD FRML MDRD: 71 ML/MIN/1.73SQ M
GFR SERPL CREATININE-BSD FRML MDRD: 72 ML/MIN/1.73SQ M
GFR SERPL CREATININE-BSD FRML MDRD: 76 ML/MIN/1.73SQ M
GLUCOSE P FAST SERPL-MCNC: 113 MG/DL (ref 65–99)
GLUCOSE SERPL-MCNC: 104 MG/DL (ref 65–140)
GLUCOSE SERPL-MCNC: 107 MG/DL (ref 65–140)
GLUCOSE SERPL-MCNC: 109 MG/DL (ref 65–140)
GLUCOSE SERPL-MCNC: 109 MG/DL (ref 65–140)
GLUCOSE SERPL-MCNC: 113 MG/DL (ref 65–140)
GLUCOSE SERPL-MCNC: 113 MG/DL (ref 65–140)
GLUCOSE SERPL-MCNC: 116 MG/DL (ref 65–140)
GLUCOSE SERPL-MCNC: 117 MG/DL (ref 65–140)
GLUCOSE SERPL-MCNC: 118 MG/DL (ref 65–140)
GLUCOSE SERPL-MCNC: 118 MG/DL (ref 65–140)
GLUCOSE SERPL-MCNC: 120 MG/DL (ref 65–140)
GLUCOSE SERPL-MCNC: 120 MG/DL (ref 65–140)
GLUCOSE SERPL-MCNC: 121 MG/DL (ref 65–140)
GLUCOSE SERPL-MCNC: 121 MG/DL (ref 65–140)
GLUCOSE SERPL-MCNC: 123 MG/DL (ref 65–140)
GLUCOSE SERPL-MCNC: 125 MG/DL (ref 65–140)
GLUCOSE SERPL-MCNC: 126 MG/DL (ref 65–140)
GLUCOSE SERPL-MCNC: 129 MG/DL (ref 65–140)
GLUCOSE SERPL-MCNC: 130 MG/DL (ref 65–140)
GLUCOSE SERPL-MCNC: 136 MG/DL (ref 65–140)
GLUCOSE SERPL-MCNC: 138 MG/DL (ref 65–140)
GLUCOSE SERPL-MCNC: 138 MG/DL (ref 65–140)
GLUCOSE SERPL-MCNC: 139 MG/DL (ref 65–140)
GLUCOSE SERPL-MCNC: 140 MG/DL (ref 65–140)
GLUCOSE SERPL-MCNC: 140 MG/DL (ref 65–140)
GLUCOSE SERPL-MCNC: 144 MG/DL (ref 65–140)
GLUCOSE SERPL-MCNC: 144 MG/DL (ref 65–140)
GLUCOSE SERPL-MCNC: 145 MG/DL (ref 65–140)
GLUCOSE SERPL-MCNC: 147 MG/DL (ref 65–140)
GLUCOSE SERPL-MCNC: 148 MG/DL (ref 65–140)
GLUCOSE SERPL-MCNC: 149 MG/DL (ref 65–140)
GLUCOSE SERPL-MCNC: 156 MG/DL (ref 65–140)
GLUCOSE SERPL-MCNC: 156 MG/DL (ref 65–140)
GLUCOSE SERPL-MCNC: 157 MG/DL (ref 65–140)
GLUCOSE SERPL-MCNC: 158 MG/DL (ref 65–140)
GLUCOSE SERPL-MCNC: 163 MG/DL (ref 65–140)
GLUCOSE SERPL-MCNC: 163 MG/DL (ref 65–140)
GLUCOSE SERPL-MCNC: 165 MG/DL (ref 65–140)
GLUCOSE SERPL-MCNC: 166 MG/DL (ref 65–140)
GLUCOSE SERPL-MCNC: 169 MG/DL (ref 65–140)
GLUCOSE SERPL-MCNC: 170 MG/DL (ref 65–140)
GLUCOSE SERPL-MCNC: 171 MG/DL (ref 65–140)
GLUCOSE SERPL-MCNC: 172 MG/DL (ref 65–140)
GLUCOSE SERPL-MCNC: 173 MG/DL (ref 65–140)
GLUCOSE SERPL-MCNC: 174 MG/DL (ref 65–140)
GLUCOSE SERPL-MCNC: 175 MG/DL (ref 65–140)
GLUCOSE SERPL-MCNC: 177 MG/DL (ref 65–140)
GLUCOSE SERPL-MCNC: 179 MG/DL (ref 65–140)
GLUCOSE SERPL-MCNC: 184 MG/DL (ref 65–140)
GLUCOSE SERPL-MCNC: 185 MG/DL (ref 65–140)
GLUCOSE SERPL-MCNC: 186 MG/DL (ref 65–140)
GLUCOSE SERPL-MCNC: 186 MG/DL (ref 65–140)
GLUCOSE SERPL-MCNC: 192 MG/DL (ref 65–140)
GLUCOSE SERPL-MCNC: 195 MG/DL (ref 65–140)
GLUCOSE SERPL-MCNC: 195 MG/DL (ref 65–140)
GLUCOSE SERPL-MCNC: 199 MG/DL (ref 65–140)
GLUCOSE SERPL-MCNC: 200 MG/DL (ref 65–140)
GLUCOSE SERPL-MCNC: 210 MG/DL (ref 65–140)
GLUCOSE SERPL-MCNC: 210 MG/DL (ref 65–140)
GLUCOSE SERPL-MCNC: 211 MG/DL (ref 65–140)
GLUCOSE SERPL-MCNC: 214 MG/DL (ref 65–140)
GLUCOSE SERPL-MCNC: 221 MG/DL (ref 65–140)
GLUCOSE SERPL-MCNC: 224 MG/DL (ref 65–140)
GLUCOSE SERPL-MCNC: 235 MG/DL (ref 65–140)
GLUCOSE SERPL-MCNC: 240 MG/DL (ref 65–140)
GLUCOSE SERPL-MCNC: 247 MG/DL (ref 65–140)
GLUCOSE SERPL-MCNC: 252 MG/DL (ref 65–140)
GLUCOSE SERPL-MCNC: 258 MG/DL (ref 65–140)
GLUCOSE SERPL-MCNC: 265 MG/DL (ref 65–140)
GLUCOSE SERPL-MCNC: 270 MG/DL (ref 65–140)
GLUCOSE SERPL-MCNC: 279 MG/DL (ref 65–140)
GLUCOSE SERPL-MCNC: 287 MG/DL (ref 65–140)
GLUCOSE SERPL-MCNC: 288 MG/DL (ref 65–140)
GLUCOSE SERPL-MCNC: 293 MG/DL (ref 65–140)
GLUCOSE SERPL-MCNC: 303 MG/DL (ref 65–140)
GLUCOSE SERPL-MCNC: 378 MG/DL (ref 65–140)
GLUCOSE SERPL-MCNC: 398 MG/DL (ref 65–140)
GLUCOSE SERPL-MCNC: 48 MG/DL (ref 65–140)
GLUCOSE SERPL-MCNC: 56 MG/DL (ref 65–140)
GLUCOSE SERPL-MCNC: 66 MG/DL (ref 65–140)
GLUCOSE SERPL-MCNC: 70 MG/DL (ref 65–140)
GLUCOSE SERPL-MCNC: 75 MG/DL (ref 65–140)
GLUCOSE SERPL-MCNC: 82 MG/DL (ref 65–140)
GLUCOSE SERPL-MCNC: 93 MG/DL (ref 65–140)
GLUCOSE SERPL-MCNC: 93 MG/DL (ref 65–140)
GLUCOSE SERPL-MCNC: 95 MG/DL (ref 65–140)
GLUCOSE SERPL-MCNC: 96 MG/DL (ref 65–140)
GLUCOSE UR STRIP-MCNC: NEGATIVE MG/DL
HBA1C MFR BLD: 6.6 %
HCO3 BLDA-SCNC: 20 MMOL/L (ref 22–28)
HCT VFR BLD AUTO: 18.5 % (ref 34.8–46.1)
HCT VFR BLD AUTO: 20 % (ref 34.8–46.1)
HCT VFR BLD AUTO: 20.8 % (ref 34.8–46.1)
HCT VFR BLD AUTO: 21.1 % (ref 34.8–46.1)
HCT VFR BLD AUTO: 21.3 % (ref 34.8–46.1)
HCT VFR BLD AUTO: 21.4 % (ref 34.8–46.1)
HCT VFR BLD AUTO: 22.2 % (ref 34.8–46.1)
HCT VFR BLD AUTO: 22.3 % (ref 34.8–46.1)
HCT VFR BLD AUTO: 22.4 % (ref 34.8–46.1)
HCT VFR BLD AUTO: 22.8 % (ref 34.8–46.1)
HCT VFR BLD AUTO: 23 % (ref 34.8–46.1)
HCT VFR BLD AUTO: 24.4 % (ref 34.8–46.1)
HCT VFR BLD AUTO: 24.9 % (ref 34.8–46.1)
HCT VFR BLD AUTO: 25.5 % (ref 34.8–46.1)
HCT VFR BLD AUTO: 25.5 % (ref 34.8–46.1)
HCT VFR BLD AUTO: 25.8 % (ref 34.8–46.1)
HCT VFR BLD AUTO: 25.9 % (ref 34.8–46.1)
HCT VFR BLD AUTO: 26 % (ref 34.8–46.1)
HCT VFR BLD AUTO: 26 % (ref 34.8–46.1)
HCT VFR BLD AUTO: 26.1 % (ref 34.8–46.1)
HCT VFR BLD AUTO: 26.3 % (ref 34.8–46.1)
HCT VFR BLD AUTO: 26.6 % (ref 34.8–46.1)
HCT VFR BLD AUTO: 26.7 % (ref 34.8–46.1)
HCT VFR BLD AUTO: 26.9 % (ref 34.8–46.1)
HCT VFR BLD AUTO: 27.8 % (ref 34.8–46.1)
HCT VFR BLD AUTO: 27.9 % (ref 34.8–46.1)
HCT VFR BLD AUTO: 28.7 % (ref 34.8–46.1)
HCT VFR BLD AUTO: 33.5 % (ref 34.8–46.1)
HCT VFR BLD AUTO: 36.4 % (ref 34.8–46.1)
HGB BLD-MCNC: 11 G/DL (ref 11.5–15.4)
HGB BLD-MCNC: 11.7 G/DL (ref 11.5–15.4)
HGB BLD-MCNC: 5.7 G/DL (ref 11.5–15.4)
HGB BLD-MCNC: 6.2 G/DL (ref 11.5–15.4)
HGB BLD-MCNC: 6.6 G/DL (ref 11.5–15.4)
HGB BLD-MCNC: 6.6 G/DL (ref 11.5–15.4)
HGB BLD-MCNC: 6.8 G/DL (ref 11.5–15.4)
HGB BLD-MCNC: 6.9 G/DL (ref 11.5–15.4)
HGB BLD-MCNC: 7.1 G/DL (ref 11.5–15.4)
HGB BLD-MCNC: 7.2 G/DL (ref 11.5–15.4)
HGB BLD-MCNC: 7.3 G/DL (ref 11.5–15.4)
HGB BLD-MCNC: 7.3 G/DL (ref 11.5–15.4)
HGB BLD-MCNC: 7.6 G/DL (ref 11.5–15.4)
HGB BLD-MCNC: 7.6 G/DL (ref 11.5–15.4)
HGB BLD-MCNC: 7.7 G/DL (ref 11.5–15.4)
HGB BLD-MCNC: 8 G/DL (ref 11.5–15.4)
HGB BLD-MCNC: 8.2 G/DL (ref 11.5–15.4)
HGB BLD-MCNC: 8.3 G/DL (ref 11.5–15.4)
HGB BLD-MCNC: 8.5 G/DL (ref 11.5–15.4)
HGB BLD-MCNC: 8.5 G/DL (ref 11.5–15.4)
HGB BLD-MCNC: 8.6 G/DL (ref 11.5–15.4)
HGB BLD-MCNC: 8.6 G/DL (ref 11.5–15.4)
HGB BLD-MCNC: 8.7 G/DL (ref 11.5–15.4)
HGB BLD-MCNC: 8.8 G/DL (ref 11.5–15.4)
HGB BLD-MCNC: 8.9 G/DL (ref 11.5–15.4)
HGB BLD-MCNC: 8.9 G/DL (ref 11.5–15.4)
HGB BLD-MCNC: 9 G/DL (ref 11.5–15.4)
HGB UR QL STRIP.AUTO: ABNORMAL
HGB UR QL STRIP.AUTO: ABNORMAL
HGB UR QL STRIP.AUTO: NEGATIVE
HYPERCHROMIA BLD QL SMEAR: PRESENT
INR PPP: 1.06 (ref 0.84–1.19)
INR PPP: 1.13 (ref 0.84–1.19)
INR PPP: 1.16 (ref 0.84–1.19)
IRON SATN MFR SERPL: 9 % (ref 15–50)
IRON SERPL-MCNC: 28 UG/DL (ref 50–170)
KETONES UR STRIP-MCNC: ABNORMAL MG/DL
KETONES UR STRIP-MCNC: NEGATIVE MG/DL
KETONES UR STRIP-MCNC: NEGATIVE MG/DL
LACTATE SERPL-SCNC: 1.6 MMOL/L (ref 0.5–2)
LEUKOCYTE ESTERASE UR QL STRIP: ABNORMAL
LEUKOCYTE ESTERASE UR QL STRIP: NEGATIVE
LEUKOCYTE ESTERASE UR QL STRIP: NEGATIVE
LYMPHOCYTES # BLD AUTO: 0.61 THOUSAND/UL (ref 0.6–4.47)
LYMPHOCYTES # BLD AUTO: 1.09 THOUSAND/UL (ref 0.6–4.47)
LYMPHOCYTES # BLD AUTO: 1.15 THOUSAND/UL (ref 0.6–4.47)
LYMPHOCYTES # BLD AUTO: 1.23 THOUSAND/UL (ref 0.6–4.47)
LYMPHOCYTES # BLD AUTO: 1.33 THOUSAND/UL (ref 0.6–4.47)
LYMPHOCYTES # BLD AUTO: 1.5 THOUSAND/UL (ref 0.6–4.47)
LYMPHOCYTES # BLD AUTO: 10 % (ref 14–44)
LYMPHOCYTES # BLD AUTO: 16 % (ref 14–44)
LYMPHOCYTES # BLD AUTO: 2.38 THOUSAND/UL (ref 0.6–4.47)
LYMPHOCYTES # BLD AUTO: 25 % (ref 14–44)
LYMPHOCYTES # BLD AUTO: 25 % (ref 14–44)
LYMPHOCYTES # BLD AUTO: 29 % (ref 14–44)
LYMPHOCYTES # BLD AUTO: 30 % (ref 14–44)
LYMPHOCYTES # BLD AUTO: 7 % (ref 14–44)
MACROCYTES BLD QL AUTO: PRESENT
MAGNESIUM SERPL-MCNC: 2.2 MG/DL (ref 1.6–2.6)
MAGNESIUM SERPL-MCNC: 2.2 MG/DL (ref 1.6–2.6)
MAGNESIUM SERPL-MCNC: 2.4 MG/DL (ref 1.6–2.6)
MAGNESIUM SERPL-MCNC: 2.5 MG/DL (ref 1.6–2.6)
MCH RBC QN AUTO: 29.8 PG (ref 26.8–34.3)
MCH RBC QN AUTO: 29.8 PG (ref 26.8–34.3)
MCH RBC QN AUTO: 29.9 PG (ref 26.8–34.3)
MCH RBC QN AUTO: 29.9 PG (ref 26.8–34.3)
MCH RBC QN AUTO: 30.3 PG (ref 26.8–34.3)
MCH RBC QN AUTO: 30.3 PG (ref 26.8–34.3)
MCH RBC QN AUTO: 30.4 PG (ref 26.8–34.3)
MCH RBC QN AUTO: 30.4 PG (ref 26.8–34.3)
MCH RBC QN AUTO: 30.6 PG (ref 26.8–34.3)
MCH RBC QN AUTO: 30.6 PG (ref 26.8–34.3)
MCH RBC QN AUTO: 30.9 PG (ref 26.8–34.3)
MCH RBC QN AUTO: 31.1 PG (ref 26.8–34.3)
MCH RBC QN AUTO: 31.1 PG (ref 26.8–34.3)
MCH RBC QN AUTO: 31.3 PG (ref 26.8–34.3)
MCH RBC QN AUTO: 31.3 PG (ref 26.8–34.3)
MCH RBC QN AUTO: 31.4 PG (ref 26.8–34.3)
MCH RBC QN AUTO: 31.7 PG (ref 26.8–34.3)
MCH RBC QN AUTO: 31.8 PG (ref 26.8–34.3)
MCH RBC QN AUTO: 32.1 PG (ref 26.8–34.3)
MCH RBC QN AUTO: 32.2 PG (ref 26.8–34.3)
MCHC RBC AUTO-ENTMCNC: 30.8 G/DL (ref 31.4–37.4)
MCHC RBC AUTO-ENTMCNC: 31 G/DL (ref 31.4–37.4)
MCHC RBC AUTO-ENTMCNC: 31.3 G/DL (ref 31.4–37.4)
MCHC RBC AUTO-ENTMCNC: 31.8 G/DL (ref 31.4–37.4)
MCHC RBC AUTO-ENTMCNC: 31.9 G/DL (ref 31.4–37.4)
MCHC RBC AUTO-ENTMCNC: 32 G/DL (ref 31.4–37.4)
MCHC RBC AUTO-ENTMCNC: 32.1 G/DL (ref 31.4–37.4)
MCHC RBC AUTO-ENTMCNC: 32.1 G/DL (ref 31.4–37.4)
MCHC RBC AUTO-ENTMCNC: 32.2 G/DL (ref 31.4–37.4)
MCHC RBC AUTO-ENTMCNC: 32.3 G/DL (ref 31.4–37.4)
MCHC RBC AUTO-ENTMCNC: 32.6 G/DL (ref 31.4–37.4)
MCHC RBC AUTO-ENTMCNC: 32.8 G/DL (ref 31.4–37.4)
MCHC RBC AUTO-ENTMCNC: 32.8 G/DL (ref 31.4–37.4)
MCHC RBC AUTO-ENTMCNC: 32.9 G/DL (ref 31.4–37.4)
MCHC RBC AUTO-ENTMCNC: 33.1 G/DL (ref 31.4–37.4)
MCHC RBC AUTO-ENTMCNC: 33.1 G/DL (ref 31.4–37.4)
MCHC RBC AUTO-ENTMCNC: 33.3 G/DL (ref 31.4–37.4)
MCHC RBC AUTO-ENTMCNC: 33.8 G/DL (ref 31.4–37.4)
MCHC RBC AUTO-ENTMCNC: 34.1 G/DL (ref 31.4–37.4)
MCHC RBC AUTO-ENTMCNC: 34.1 G/DL (ref 31.4–37.4)
MCV RBC AUTO: 100 FL (ref 82–98)
MCV RBC AUTO: 100 FL (ref 82–98)
MCV RBC AUTO: 101 FL (ref 82–98)
MCV RBC AUTO: 103 FL (ref 82–98)
MCV RBC AUTO: 89 FL (ref 82–98)
MCV RBC AUTO: 90 FL (ref 82–98)
MCV RBC AUTO: 91 FL (ref 82–98)
MCV RBC AUTO: 92 FL (ref 82–98)
MCV RBC AUTO: 92 FL (ref 82–98)
MCV RBC AUTO: 93 FL (ref 82–98)
MCV RBC AUTO: 93 FL (ref 82–98)
MCV RBC AUTO: 95 FL (ref 82–98)
MCV RBC AUTO: 97 FL (ref 82–98)
MCV RBC AUTO: 97 FL (ref 82–98)
MCV RBC AUTO: 98 FL (ref 82–98)
MCV RBC AUTO: 98 FL (ref 82–98)
MCV RBC AUTO: 99 FL (ref 82–98)
METAMYELOCYTES NFR BLD MANUAL: 1 % (ref 0–1)
METAMYELOCYTES NFR BLD MANUAL: 3 % (ref 0–1)
MONOCYTES # BLD AUTO: 0.16 THOUSAND/UL (ref 0–1.22)
MONOCYTES # BLD AUTO: 0.38 THOUSAND/UL (ref 0–1.22)
MONOCYTES # BLD AUTO: 0.47 THOUSAND/UL (ref 0–1.22)
MONOCYTES # BLD AUTO: 0.57 THOUSAND/UL (ref 0–1.22)
MONOCYTES # BLD AUTO: 0.67 THOUSAND/UL (ref 0–1.22)
MONOCYTES # BLD AUTO: 0.76 THOUSAND/UL (ref 0–1.22)
MONOCYTES # BLD AUTO: 1.5 THOUSAND/UL (ref 0–1.22)
MONOCYTES NFR BLD: 10 % (ref 4–12)
MONOCYTES NFR BLD: 11 % (ref 4–12)
MONOCYTES NFR BLD: 15 % (ref 4–12)
MONOCYTES NFR BLD: 3 % (ref 4–12)
MONOCYTES NFR BLD: 7 % (ref 4–12)
MUCOUS THREADS UR QL AUTO: ABNORMAL
MYELOCYTES NFR BLD MANUAL: 1 % (ref 0–1)
MYELOCYTES NFR BLD MANUAL: 2 % (ref 0–1)
NASAL CANNULA: 2
NEUTROPHILS # BLD MANUAL: 18.48 THOUSAND/UL (ref 1.85–7.62)
NEUTROPHILS # BLD MANUAL: 2.26 THOUSAND/UL (ref 1.85–7.62)
NEUTROPHILS # BLD MANUAL: 2.42 THOUSAND/UL (ref 1.85–7.62)
NEUTROPHILS # BLD MANUAL: 2.55 THOUSAND/UL (ref 1.85–7.62)
NEUTROPHILS # BLD MANUAL: 3.62 THOUSAND/UL (ref 1.85–7.62)
NEUTROPHILS # BLD MANUAL: 6.19 THOUSAND/UL (ref 1.85–7.62)
NEUTROPHILS # BLD MANUAL: 8.94 THOUSAND/UL (ref 1.85–7.62)
NEUTS BAND NFR BLD MANUAL: 1 % (ref 0–8)
NEUTS BAND NFR BLD MANUAL: 1 % (ref 0–8)
NEUTS BAND NFR BLD MANUAL: 2 % (ref 0–8)
NEUTS BAND NFR BLD MANUAL: 2 % (ref 0–8)
NEUTS BAND NFR BLD MANUAL: 4 % (ref 0–8)
NEUTS SEG NFR BLD AUTO: 56 % (ref 43–75)
NEUTS SEG NFR BLD AUTO: 59 % (ref 43–75)
NEUTS SEG NFR BLD AUTO: 64 % (ref 43–75)
NEUTS SEG NFR BLD AUTO: 64 % (ref 43–75)
NEUTS SEG NFR BLD AUTO: 65 % (ref 43–75)
NEUTS SEG NFR BLD AUTO: 82 % (ref 43–75)
NEUTS SEG NFR BLD AUTO: 84 % (ref 43–75)
NITRITE UR QL STRIP: NEGATIVE
NITRITE UR QL STRIP: NEGATIVE
NITRITE UR QL STRIP: POSITIVE
NON-SQ EPI CELLS URNS QL MICRO: ABNORMAL /HPF
NRBC BLD AUTO-RTO: 0 /100 WBCS
NT-PROBNP SERPL-MCNC: 2258 PG/ML
NT-PROBNP SERPL-MCNC: ABNORMAL PG/ML
O2 CT BLDA-SCNC: 9.7 ML/DL (ref 16–23)
OSMOLALITY UR: 346 MMOL/KG
OSMOLALITY UR: 377 MMOL/KG
OXYHGB MFR BLDA: 90.1 % (ref 94–97)
P AXIS: -21 DEGREES
P AXIS: 34 DEGREES
P AXIS: 65 DEGREES
P AXIS: 67 DEGREES
P AXIS: 81 DEGREES
P AXIS: 89 DEGREES
PCO2 BLDA: 28.6 MM HG (ref 36–44)
PH BLDA: 7.46 [PH] (ref 7.35–7.45)
PH UR STRIP.AUTO: 5 [PH]
PH UR STRIP.AUTO: 5.5 [PH]
PH UR STRIP.AUTO: 5.5 [PH]
PHOSPHATE SERPL-MCNC: 5.3 MG/DL (ref 2.3–4.1)
PLATELET # BLD AUTO: 105 THOUSANDS/UL (ref 149–390)
PLATELET # BLD AUTO: 105 THOUSANDS/UL (ref 149–390)
PLATELET # BLD AUTO: 108 THOUSANDS/UL (ref 149–390)
PLATELET # BLD AUTO: 111 THOUSANDS/UL (ref 149–390)
PLATELET # BLD AUTO: 114 THOUSANDS/UL (ref 149–390)
PLATELET # BLD AUTO: 115 THOUSANDS/UL (ref 149–390)
PLATELET # BLD AUTO: 121 THOUSANDS/UL (ref 149–390)
PLATELET # BLD AUTO: 130 THOUSANDS/UL (ref 149–390)
PLATELET # BLD AUTO: 130 THOUSANDS/UL (ref 149–390)
PLATELET # BLD AUTO: 131 THOUSANDS/UL (ref 149–390)
PLATELET # BLD AUTO: 136 THOUSANDS/UL (ref 149–390)
PLATELET # BLD AUTO: 138 THOUSANDS/UL (ref 149–390)
PLATELET # BLD AUTO: 139 THOUSANDS/UL (ref 149–390)
PLATELET # BLD AUTO: 141 THOUSANDS/UL (ref 149–390)
PLATELET # BLD AUTO: 142 THOUSANDS/UL (ref 149–390)
PLATELET # BLD AUTO: 151 THOUSANDS/UL (ref 149–390)
PLATELET # BLD AUTO: 152 THOUSANDS/UL (ref 149–390)
PLATELET # BLD AUTO: 157 THOUSANDS/UL (ref 149–390)
PLATELET # BLD AUTO: 189 THOUSANDS/UL (ref 149–390)
PLATELET # BLD AUTO: 220 THOUSANDS/UL (ref 149–390)
PLATELET BLD QL SMEAR: ABNORMAL
PLATELET BLD QL SMEAR: ADEQUATE
PMV BLD AUTO: 10 FL (ref 8.9–12.7)
PMV BLD AUTO: 10.1 FL (ref 8.9–12.7)
PMV BLD AUTO: 10.3 FL (ref 8.9–12.7)
PMV BLD AUTO: 10.4 FL (ref 8.9–12.7)
PMV BLD AUTO: 8.8 FL (ref 8.9–12.7)
PMV BLD AUTO: 9.2 FL (ref 8.9–12.7)
PMV BLD AUTO: 9.3 FL (ref 8.9–12.7)
PMV BLD AUTO: 9.4 FL (ref 8.9–12.7)
PMV BLD AUTO: 9.5 FL (ref 8.9–12.7)
PMV BLD AUTO: 9.5 FL (ref 8.9–12.7)
PMV BLD AUTO: 9.6 FL (ref 8.9–12.7)
PMV BLD AUTO: 9.6 FL (ref 8.9–12.7)
PMV BLD AUTO: 9.7 FL (ref 8.9–12.7)
PMV BLD AUTO: 9.9 FL (ref 8.9–12.7)
PO2 BLDA: 61 MM HG (ref 75–129)
POTASSIUM SERPL-SCNC: 3.6 MMOL/L (ref 3.5–5.3)
POTASSIUM SERPL-SCNC: 3.8 MMOL/L (ref 3.5–5.3)
POTASSIUM SERPL-SCNC: 3.9 MMOL/L (ref 3.5–5.3)
POTASSIUM SERPL-SCNC: 4.1 MMOL/L (ref 3.5–5.3)
POTASSIUM SERPL-SCNC: 4.2 MMOL/L (ref 3.5–5.3)
POTASSIUM SERPL-SCNC: 4.3 MMOL/L (ref 3.5–5.3)
POTASSIUM SERPL-SCNC: 4.4 MMOL/L (ref 3.5–5.3)
POTASSIUM SERPL-SCNC: 4.5 MMOL/L (ref 3.5–5.3)
POTASSIUM SERPL-SCNC: 4.5 MMOL/L (ref 3.5–5.3)
POTASSIUM SERPL-SCNC: 4.6 MMOL/L (ref 3.5–5.3)
POTASSIUM SERPL-SCNC: 4.6 MMOL/L (ref 3.5–5.3)
POTASSIUM SERPL-SCNC: 4.7 MMOL/L (ref 3.5–5.3)
POTASSIUM SERPL-SCNC: 5.2 MMOL/L (ref 3.5–5.3)
POTASSIUM SERPL-SCNC: 5.2 MMOL/L (ref 3.5–5.3)
POTASSIUM SERPL-SCNC: 5.5 MMOL/L (ref 3.5–5.3)
POTASSIUM SERPL-SCNC: 5.6 MMOL/L (ref 3.5–5.3)
POTASSIUM UR-SCNC: 14 MMOL/L
PR INTERVAL: 112 MS
PR INTERVAL: 180 MS
PR INTERVAL: 224 MS
PR INTERVAL: 226 MS
PR INTERVAL: 228 MS
PR INTERVAL: 232 MS
PROCALCITONIN SERPL-MCNC: 0.56 NG/ML
PROCALCITONIN SERPL-MCNC: 1.21 NG/ML
PROCALCITONIN SERPL-MCNC: 1.46 NG/ML
PROCALCITONIN SERPL-MCNC: 2.44 NG/ML
PROT SERPL-MCNC: 5.7 G/DL (ref 6.4–8.2)
PROT SERPL-MCNC: 5.8 G/DL (ref 6.4–8.2)
PROT SERPL-MCNC: 5.8 G/DL (ref 6.4–8.2)
PROT SERPL-MCNC: 6.2 G/DL (ref 6.4–8.2)
PROT SERPL-MCNC: 7.4 G/DL (ref 6.4–8.2)
PROT UR STRIP-MCNC: ABNORMAL MG/DL
PROT UR STRIP-MCNC: ABNORMAL MG/DL
PROT UR STRIP-MCNC: NEGATIVE MG/DL
PROTHROMBIN TIME: 13.4 SECONDS (ref 11.6–14.5)
PROTHROMBIN TIME: 14 SECONDS (ref 11.6–14.5)
PROTHROMBIN TIME: 14.3 SECONDS (ref 11.6–14.5)
QRS AXIS: 116 DEGREES
QRS AXIS: 122 DEGREES
QRS AXIS: 24 DEGREES
QRS AXIS: 50 DEGREES
QRS AXIS: 56 DEGREES
QRS AXIS: 57 DEGREES
QRS AXIS: 60 DEGREES
QRS AXIS: 60 DEGREES
QRSD INTERVAL: 184 MS
QRSD INTERVAL: 186 MS
QRSD INTERVAL: 186 MS
QRSD INTERVAL: 188 MS
QT INTERVAL: 484 MS
QT INTERVAL: 496 MS
QT INTERVAL: 498 MS
QT INTERVAL: 498 MS
QT INTERVAL: 502 MS
QT INTERVAL: 524 MS
QT INTERVAL: 530 MS
QT INTERVAL: 532 MS
QTC INTERVAL: 524 MS
QTC INTERVAL: 530 MS
QTC INTERVAL: 546 MS
QTC INTERVAL: 548 MS
QTC INTERVAL: 548 MS
QTC INTERVAL: 549 MS
QTC INTERVAL: 553 MS
QTC INTERVAL: 558 MS
RBC # BLD AUTO: 1.83 MILLION/UL (ref 3.81–5.12)
RBC # BLD AUTO: 1.95 MILLION/UL (ref 3.81–5.12)
RBC # BLD AUTO: 2.11 MILLION/UL (ref 3.81–5.12)
RBC # BLD AUTO: 2.24 MILLION/UL (ref 3.81–5.12)
RBC # BLD AUTO: 2.36 MILLION/UL (ref 3.81–5.12)
RBC # BLD AUTO: 2.41 MILLION/UL (ref 3.81–5.12)
RBC # BLD AUTO: 2.51 MILLION/UL (ref 3.81–5.12)
RBC # BLD AUTO: 2.52 MILLION/UL (ref 3.81–5.12)
RBC # BLD AUTO: 2.64 MILLION/UL (ref 3.81–5.12)
RBC # BLD AUTO: 2.67 MILLION/UL (ref 3.81–5.12)
RBC # BLD AUTO: 2.68 MILLION/UL (ref 3.81–5.12)
RBC # BLD AUTO: 2.71 MILLION/UL (ref 3.81–5.12)
RBC # BLD AUTO: 2.73 MILLION/UL (ref 3.81–5.12)
RBC # BLD AUTO: 2.77 MILLION/UL (ref 3.81–5.12)
RBC # BLD AUTO: 2.84 MILLION/UL (ref 3.81–5.12)
RBC # BLD AUTO: 2.85 MILLION/UL (ref 3.81–5.12)
RBC # BLD AUTO: 2.93 MILLION/UL (ref 3.81–5.12)
RBC # BLD AUTO: 2.95 MILLION/UL (ref 3.81–5.12)
RBC # BLD AUTO: 3.6 MILLION/UL (ref 3.81–5.12)
RBC # BLD AUTO: 3.91 MILLION/UL (ref 3.81–5.12)
RBC #/AREA URNS AUTO: ABNORMAL /HPF
RBC MORPH BLD: NORMAL
RBC MORPH BLD: NORMAL
RH BLD: POSITIVE
SARS-COV-2 RNA RESP QL NAA+PROBE: NEGATIVE
SARS-COV-2 RNA RESP QL NAA+PROBE: NEGATIVE
SODIUM 24H UR-SCNC: 10 MOL/L
SODIUM 24H UR-SCNC: 14 MOL/L
SODIUM SERPL-SCNC: 123 MMOL/L (ref 136–145)
SODIUM SERPL-SCNC: 123 MMOL/L (ref 136–145)
SODIUM SERPL-SCNC: 125 MMOL/L (ref 136–145)
SODIUM SERPL-SCNC: 126 MMOL/L (ref 136–145)
SODIUM SERPL-SCNC: 127 MMOL/L (ref 136–145)
SODIUM SERPL-SCNC: 127 MMOL/L (ref 136–145)
SODIUM SERPL-SCNC: 128 MMOL/L (ref 136–145)
SODIUM SERPL-SCNC: 130 MMOL/L (ref 136–145)
SODIUM SERPL-SCNC: 131 MMOL/L (ref 136–145)
SODIUM SERPL-SCNC: 131 MMOL/L (ref 136–145)
SODIUM SERPL-SCNC: 133 MMOL/L (ref 136–145)
SODIUM SERPL-SCNC: 134 MMOL/L (ref 136–145)
SODIUM SERPL-SCNC: 135 MMOL/L (ref 136–145)
SODIUM SERPL-SCNC: 135 MMOL/L (ref 136–145)
SODIUM SERPL-SCNC: 136 MMOL/L (ref 136–145)
SODIUM SERPL-SCNC: 136 MMOL/L (ref 136–145)
SODIUM SERPL-SCNC: 137 MMOL/L (ref 136–145)
SODIUM SERPL-SCNC: 138 MMOL/L (ref 136–145)
SP GR UR STRIP.AUTO: 1.01 (ref 1–1.03)
SP GR UR STRIP.AUTO: 1.01 (ref 1–1.03)
SP GR UR STRIP.AUTO: 1.02 (ref 1–1.03)
SPECIMEN EXPIRATION DATE: NORMAL
SPECIMEN SOURCE: ABNORMAL
T WAVE AXIS: -51 DEGREES
T WAVE AXIS: -80 DEGREES
T WAVE AXIS: 146 DEGREES
T WAVE AXIS: 197 DEGREES
T WAVE AXIS: 205 DEGREES
T WAVE AXIS: 224 DEGREES
T WAVE AXIS: 226 DEGREES
T WAVE AXIS: 7 DEGREES
TIBC SERPL-MCNC: 320 UG/DL (ref 250–450)
TOTAL CELLS COUNTED SPEC: 100
TROPONIN I SERPL-MCNC: 0.07 NG/ML
TROPONIN I SERPL-MCNC: 0.11 NG/ML
TROPONIN I SERPL-MCNC: 0.11 NG/ML
TROPONIN I SERPL-MCNC: 0.12 NG/ML
TROPONIN I SERPL-MCNC: 0.14 NG/ML
TROPONIN I SERPL-MCNC: 0.16 NG/ML
TROPONIN I SERPL-MCNC: 0.21 NG/ML
TROPONIN I SERPL-MCNC: 0.24 NG/ML
TROPONIN I SERPL-MCNC: 0.26 NG/ML
UNIT DISPENSE STATUS: NORMAL
UNIT PRODUCT CODE: NORMAL
UNIT PRODUCT VOLUME: 300 ML
UNIT PRODUCT VOLUME: 350 ML
UNIT RH: NORMAL
URATE SERPL-MCNC: 6.2 MG/DL (ref 2–6.8)
UROBILINOGEN UR QL STRIP.AUTO: 0.2 E.U./DL
UUN 24H UR-MCNC: 328 MG/DL
VENTRICULAR RATE: 60 BPM
VENTRICULAR RATE: 60 BPM
VENTRICULAR RATE: 65 BPM
VENTRICULAR RATE: 72 BPM
VENTRICULAR RATE: 73 BPM
VENTRICULAR RATE: 80 BPM
VIT B12 SERPL-MCNC: 1847 PG/ML (ref 100–900)
WBC # BLD AUTO: 10.9 THOUSAND/UL (ref 4.31–10.16)
WBC # BLD AUTO: 13.27 THOUSAND/UL (ref 4.31–10.16)
WBC # BLD AUTO: 14.87 THOUSAND/UL (ref 4.31–10.16)
WBC # BLD AUTO: 16 THOUSAND/UL (ref 4.31–10.16)
WBC # BLD AUTO: 18.83 THOUSAND/UL (ref 4.31–10.16)
WBC # BLD AUTO: 19.04 THOUSAND/UL (ref 4.31–10.16)
WBC # BLD AUTO: 19.18 THOUSAND/UL (ref 4.31–10.16)
WBC # BLD AUTO: 21.07 THOUSAND/UL (ref 4.31–10.16)
WBC # BLD AUTO: 21.2 THOUSAND/UL (ref 4.31–10.16)
WBC # BLD AUTO: 21.49 THOUSAND/UL (ref 4.31–10.16)
WBC # BLD AUTO: 3.41 THOUSAND/UL (ref 4.31–10.16)
WBC # BLD AUTO: 3.81 THOUSAND/UL (ref 4.31–10.16)
WBC # BLD AUTO: 3.83 THOUSAND/UL (ref 4.31–10.16)
WBC # BLD AUTO: 4.24 THOUSAND/UL (ref 4.31–10.16)
WBC # BLD AUTO: 4.37 THOUSAND/UL (ref 4.31–10.16)
WBC # BLD AUTO: 4.75 THOUSAND/UL (ref 4.31–10.16)
WBC # BLD AUTO: 5.11 THOUSAND/UL (ref 4.31–10.16)
WBC # BLD AUTO: 5.32 THOUSAND/UL (ref 4.31–10.16)
WBC # BLD AUTO: 5.98 THOUSAND/UL (ref 4.31–10.16)
WBC # BLD AUTO: 9.52 THOUSAND/UL (ref 4.31–10.16)
WBC #/AREA URNS AUTO: ABNORMAL /HPF

## 2021-01-01 PROCEDURE — 85007 BL SMEAR W/DIFF WBC COUNT: CPT | Performed by: STUDENT IN AN ORGANIZED HEALTH CARE EDUCATION/TRAINING PROGRAM

## 2021-01-01 PROCEDURE — 92526 ORAL FUNCTION THERAPY: CPT

## 2021-01-01 PROCEDURE — 99239 HOSP IP/OBS DSCHRG MGMT >30: CPT | Performed by: STUDENT IN AN ORGANIZED HEALTH CARE EDUCATION/TRAINING PROGRAM

## 2021-01-01 PROCEDURE — 85027 COMPLETE CBC AUTOMATED: CPT | Performed by: EMERGENCY MEDICINE

## 2021-01-01 PROCEDURE — 85007 BL SMEAR W/DIFF WBC COUNT: CPT | Performed by: EMERGENCY MEDICINE

## 2021-01-01 PROCEDURE — 85014 HEMATOCRIT: CPT | Performed by: FAMILY MEDICINE

## 2021-01-01 PROCEDURE — 85018 HEMOGLOBIN: CPT | Performed by: FAMILY MEDICINE

## 2021-01-01 PROCEDURE — 82948 REAGENT STRIP/BLOOD GLUCOSE: CPT

## 2021-01-01 PROCEDURE — 73552 X-RAY EXAM OF FEMUR 2/>: CPT

## 2021-01-01 PROCEDURE — 99232 SBSQ HOSP IP/OBS MODERATE 35: CPT | Performed by: INTERNAL MEDICINE

## 2021-01-01 PROCEDURE — 27245 TREAT THIGH FRACTURE: CPT | Performed by: ORTHOPAEDIC SURGERY

## 2021-01-01 PROCEDURE — 99239 HOSP IP/OBS DSCHRG MGMT >30: CPT | Performed by: INTERNAL MEDICINE

## 2021-01-01 PROCEDURE — 87086 URINE CULTURE/COLONY COUNT: CPT | Performed by: INTERNAL MEDICINE

## 2021-01-01 PROCEDURE — 80048 BASIC METABOLIC PNL TOTAL CA: CPT | Performed by: PHYSICIAN ASSISTANT

## 2021-01-01 PROCEDURE — 3288F FALL RISK ASSESSMENT DOCD: CPT | Performed by: INTERNAL MEDICINE

## 2021-01-01 PROCEDURE — 99024 POSTOP FOLLOW-UP VISIT: CPT | Performed by: PHYSICIAN ASSISTANT

## 2021-01-01 PROCEDURE — 88311 DECALCIFY TISSUE: CPT | Performed by: PATHOLOGY

## 2021-01-01 PROCEDURE — 1100F PTFALLS ASSESS-DOCD GE2>/YR: CPT | Performed by: INTERNAL MEDICINE

## 2021-01-01 PROCEDURE — 80048 BASIC METABOLIC PNL TOTAL CA: CPT | Performed by: INTERNAL MEDICINE

## 2021-01-01 PROCEDURE — 3079F DIAST BP 80-89 MM HG: CPT | Performed by: INTERNAL MEDICINE

## 2021-01-01 PROCEDURE — 82728 ASSAY OF FERRITIN: CPT | Performed by: INTERNAL MEDICINE

## 2021-01-01 PROCEDURE — C9113 INJ PANTOPRAZOLE SODIUM, VIA: HCPCS | Performed by: EMERGENCY MEDICINE

## 2021-01-01 PROCEDURE — 0T9B70Z DRAINAGE OF BLADDER WITH DRAINAGE DEVICE, VIA NATURAL OR ARTIFICIAL OPENING: ICD-10-PCS | Performed by: UROLOGY

## 2021-01-01 PROCEDURE — 36415 COLL VENOUS BLD VENIPUNCTURE: CPT | Performed by: STUDENT IN AN ORGANIZED HEALTH CARE EDUCATION/TRAINING PROGRAM

## 2021-01-01 PROCEDURE — 82272 OCCULT BLD FECES 1-3 TESTS: CPT

## 2021-01-01 PROCEDURE — 74177 CT ABD & PELVIS W/CONTRAST: CPT

## 2021-01-01 PROCEDURE — 83036 HEMOGLOBIN GLYCOSYLATED A1C: CPT | Performed by: INTERNAL MEDICINE

## 2021-01-01 PROCEDURE — 93005 ELECTROCARDIOGRAM TRACING: CPT

## 2021-01-01 PROCEDURE — 93010 ELECTROCARDIOGRAM REPORT: CPT | Performed by: INTERNAL MEDICINE

## 2021-01-01 PROCEDURE — 84484 ASSAY OF TROPONIN QUANT: CPT | Performed by: EMERGENCY MEDICINE

## 2021-01-01 PROCEDURE — 99285 EMERGENCY DEPT VISIT HI MDM: CPT

## 2021-01-01 PROCEDURE — 86920 COMPATIBILITY TEST SPIN: CPT

## 2021-01-01 PROCEDURE — 86900 BLOOD TYPING SEROLOGIC ABO: CPT | Performed by: INTERNAL MEDICINE

## 2021-01-01 PROCEDURE — 36415 COLL VENOUS BLD VENIPUNCTURE: CPT | Performed by: FAMILY MEDICINE

## 2021-01-01 PROCEDURE — P9016 RBC LEUKOCYTES REDUCED: HCPCS

## 2021-01-01 PROCEDURE — 88342 IMHCHEM/IMCYTCHM 1ST ANTB: CPT | Performed by: PATHOLOGY

## 2021-01-01 PROCEDURE — 86923 COMPATIBILITY TEST ELECTRIC: CPT

## 2021-01-01 PROCEDURE — C1769 GUIDE WIRE: HCPCS | Performed by: ORTHOPAEDIC SURGERY

## 2021-01-01 PROCEDURE — 85027 COMPLETE CBC AUTOMATED: CPT | Performed by: STUDENT IN AN ORGANIZED HEALTH CARE EDUCATION/TRAINING PROGRAM

## 2021-01-01 PROCEDURE — 99310 SBSQ NF CARE HIGH MDM 45: CPT | Performed by: NURSE PRACTITIONER

## 2021-01-01 PROCEDURE — 82805 BLOOD GASES W/O2 SATURATION: CPT | Performed by: INTERNAL MEDICINE

## 2021-01-01 PROCEDURE — 93923 UPR/LXTR ART STDY 3+ LVLS: CPT

## 2021-01-01 PROCEDURE — 84484 ASSAY OF TROPONIN QUANT: CPT | Performed by: INTERNAL MEDICINE

## 2021-01-01 PROCEDURE — 93970 EXTREMITY STUDY: CPT

## 2021-01-01 PROCEDURE — 84145 PROCALCITONIN (PCT): CPT | Performed by: INTERNAL MEDICINE

## 2021-01-01 PROCEDURE — G1004 CDSM NDSC: HCPCS

## 2021-01-01 PROCEDURE — 83735 ASSAY OF MAGNESIUM: CPT | Performed by: INTERNAL MEDICINE

## 2021-01-01 PROCEDURE — 70450 CT HEAD/BRAIN W/O DYE: CPT

## 2021-01-01 PROCEDURE — 97167 OT EVAL HIGH COMPLEX 60 MIN: CPT

## 2021-01-01 PROCEDURE — 44366 SMALL BOWEL ENDOSCOPY: CPT | Performed by: INTERNAL MEDICINE

## 2021-01-01 PROCEDURE — 99214 OFFICE O/P EST MOD 30 MIN: CPT | Performed by: INTERNAL MEDICINE

## 2021-01-01 PROCEDURE — 85027 COMPLETE CBC AUTOMATED: CPT | Performed by: INTERNAL MEDICINE

## 2021-01-01 PROCEDURE — 82570 ASSAY OF URINE CREATININE: CPT | Performed by: INTERNAL MEDICINE

## 2021-01-01 PROCEDURE — 99220 PR INITIAL OBSERVATION CARE/DAY 70 MINUTES: CPT | Performed by: FAMILY MEDICINE

## 2021-01-01 PROCEDURE — 43235 EGD DIAGNOSTIC BRUSH WASH: CPT | Performed by: INTERNAL MEDICINE

## 2021-01-01 PROCEDURE — 84300 ASSAY OF URINE SODIUM: CPT | Performed by: INTERNAL MEDICINE

## 2021-01-01 PROCEDURE — 83880 ASSAY OF NATRIURETIC PEPTIDE: CPT | Performed by: INTERNAL MEDICINE

## 2021-01-01 PROCEDURE — U0005 INFEC AGEN DETEC AMPLI PROBE: HCPCS | Performed by: INTERNAL MEDICINE

## 2021-01-01 PROCEDURE — 77066 DX MAMMO INCL CAD BI: CPT

## 2021-01-01 PROCEDURE — 3077F SYST BP >= 140 MM HG: CPT | Performed by: INTERNAL MEDICINE

## 2021-01-01 PROCEDURE — 81001 URINALYSIS AUTO W/SCOPE: CPT | Performed by: INTERNAL MEDICINE

## 2021-01-01 PROCEDURE — 86850 RBC ANTIBODY SCREEN: CPT | Performed by: EMERGENCY MEDICINE

## 2021-01-01 PROCEDURE — 71045 X-RAY EXAM CHEST 1 VIEW: CPT

## 2021-01-01 PROCEDURE — 99024 POSTOP FOLLOW-UP VISIT: CPT | Performed by: ORTHOPAEDIC SURGERY

## 2021-01-01 PROCEDURE — 87077 CULTURE AEROBIC IDENTIFY: CPT | Performed by: INTERNAL MEDICINE

## 2021-01-01 PROCEDURE — 99316 NF DSCHRG MGMT 30 MIN+: CPT | Performed by: NURSE PRACTITIONER

## 2021-01-01 PROCEDURE — U0003 INFECTIOUS AGENT DETECTION BY NUCLEIC ACID (DNA OR RNA); SEVERE ACUTE RESPIRATORY SYNDROME CORONAVIRUS 2 (SARS-COV-2) (CORONAVIRUS DISEASE [COVID-19]), AMPLIFIED PROBE TECHNIQUE, MAKING USE OF HIGH THROUGHPUT TECHNOLOGIES AS DESCRIBED BY CMS-2020-01-R: HCPCS | Performed by: EMERGENCY MEDICINE

## 2021-01-01 PROCEDURE — 80053 COMPREHEN METABOLIC PANEL: CPT | Performed by: INTERNAL MEDICINE

## 2021-01-01 PROCEDURE — U0003 INFECTIOUS AGENT DETECTION BY NUCLEIC ACID (DNA OR RNA); SEVERE ACUTE RESPIRATORY SYNDROME CORONAVIRUS 2 (SARS-COV-2) (CORONAVIRUS DISEASE [COVID-19]), AMPLIFIED PROBE TECHNIQUE, MAKING USE OF HIGH THROUGHPUT TECHNOLOGIES AS DESCRIBED BY CMS-2020-01-R: HCPCS | Performed by: INTERNAL MEDICINE

## 2021-01-01 PROCEDURE — 99223 1ST HOSP IP/OBS HIGH 75: CPT | Performed by: INTERNAL MEDICINE

## 2021-01-01 PROCEDURE — 73502 X-RAY EXAM HIP UNI 2-3 VIEWS: CPT

## 2021-01-01 PROCEDURE — 99291 CRITICAL CARE FIRST HOUR: CPT | Performed by: EMERGENCY MEDICINE

## 2021-01-01 PROCEDURE — 36430 TRANSFUSION BLD/BLD COMPNT: CPT

## 2021-01-01 PROCEDURE — 99232 SBSQ HOSP IP/OBS MODERATE 35: CPT | Performed by: STUDENT IN AN ORGANIZED HEALTH CARE EDUCATION/TRAINING PROGRAM

## 2021-01-01 PROCEDURE — 94760 N-INVAS EAR/PLS OXIMETRY 1: CPT

## 2021-01-01 PROCEDURE — 30233N1 TRANSFUSION OF NONAUTOLOGOUS RED BLOOD CELLS INTO PERIPHERAL VEIN, PERCUTANEOUS APPROACH: ICD-10-PCS | Performed by: EMERGENCY MEDICINE

## 2021-01-01 PROCEDURE — 71046 X-RAY EXAM CHEST 2 VIEWS: CPT

## 2021-01-01 PROCEDURE — 82607 VITAMIN B-12: CPT | Performed by: INTERNAL MEDICINE

## 2021-01-01 PROCEDURE — 30233N1 TRANSFUSION OF NONAUTOLOGOUS RED BLOOD CELLS INTO PERIPHERAL VEIN, PERCUTANEOUS APPROACH: ICD-10-PCS | Performed by: FAMILY MEDICINE

## 2021-01-01 PROCEDURE — 88307 TISSUE EXAM BY PATHOLOGIST: CPT | Performed by: PATHOLOGY

## 2021-01-01 PROCEDURE — 27245 TREAT THIGH FRACTURE: CPT | Performed by: PHYSICIAN ASSISTANT

## 2021-01-01 PROCEDURE — 91110 GI TRC IMG INTRAL ESOPH-ILE: CPT | Performed by: INTERNAL MEDICINE

## 2021-01-01 PROCEDURE — 99232 SBSQ HOSP IP/OBS MODERATE 35: CPT | Performed by: PHYSICIAN ASSISTANT

## 2021-01-01 PROCEDURE — 96361 HYDRATE IV INFUSION ADD-ON: CPT

## 2021-01-01 PROCEDURE — 83735 ASSAY OF MAGNESIUM: CPT | Performed by: EMERGENCY MEDICINE

## 2021-01-01 PROCEDURE — 36415 COLL VENOUS BLD VENIPUNCTURE: CPT | Performed by: EMERGENCY MEDICINE

## 2021-01-01 PROCEDURE — 85007 BL SMEAR W/DIFF WBC COUNT: CPT | Performed by: INTERNAL MEDICINE

## 2021-01-01 PROCEDURE — 86900 BLOOD TYPING SEROLOGIC ABO: CPT | Performed by: EMERGENCY MEDICINE

## 2021-01-01 PROCEDURE — NC001 PR NO CHARGE: Performed by: INTERNAL MEDICINE

## 2021-01-01 PROCEDURE — 80053 COMPREHEN METABOLIC PANEL: CPT | Performed by: EMERGENCY MEDICINE

## 2021-01-01 PROCEDURE — C9113 INJ PANTOPRAZOLE SODIUM, VIA: HCPCS | Performed by: FAMILY MEDICINE

## 2021-01-01 PROCEDURE — U0005 INFEC AGEN DETEC AMPLI PROBE: HCPCS | Performed by: EMERGENCY MEDICINE

## 2021-01-01 PROCEDURE — 94664 DEMO&/EVAL PT USE INHALER: CPT

## 2021-01-01 PROCEDURE — 83550 IRON BINDING TEST: CPT | Performed by: INTERNAL MEDICINE

## 2021-01-01 PROCEDURE — 84540 ASSAY OF URINE/UREA-N: CPT | Performed by: INTERNAL MEDICINE

## 2021-01-01 PROCEDURE — 93922 UPR/L XTREMITY ART 2 LEVELS: CPT | Performed by: SURGERY

## 2021-01-01 PROCEDURE — 93970 EXTREMITY STUDY: CPT | Performed by: SURGERY

## 2021-01-01 PROCEDURE — 85730 THROMBOPLASTIN TIME PARTIAL: CPT | Performed by: EMERGENCY MEDICINE

## 2021-01-01 PROCEDURE — 99308 SBSQ NF CARE LOW MDM 20: CPT | Performed by: NURSE PRACTITIONER

## 2021-01-01 PROCEDURE — 99233 SBSQ HOSP IP/OBS HIGH 50: CPT | Performed by: INTERNAL MEDICINE

## 2021-01-01 PROCEDURE — 71260 CT THORAX DX C+: CPT

## 2021-01-01 PROCEDURE — 96374 THER/PROPH/DIAG INJ IV PUSH: CPT

## 2021-01-01 PROCEDURE — 85610 PROTHROMBIN TIME: CPT | Performed by: EMERGENCY MEDICINE

## 2021-01-01 PROCEDURE — 99215 OFFICE O/P EST HI 40 MIN: CPT | Performed by: INTERNAL MEDICINE

## 2021-01-01 PROCEDURE — 80048 BASIC METABOLIC PNL TOTAL CA: CPT | Performed by: EMERGENCY MEDICINE

## 2021-01-01 PROCEDURE — 85014 HEMATOCRIT: CPT | Performed by: INTERNAL MEDICINE

## 2021-01-01 PROCEDURE — 96375 TX/PRO/DX INJ NEW DRUG ADDON: CPT

## 2021-01-01 PROCEDURE — C1713 ANCHOR/SCREW BN/BN,TIS/BN: HCPCS | Performed by: ORTHOPAEDIC SURGERY

## 2021-01-01 PROCEDURE — 87040 BLOOD CULTURE FOR BACTERIA: CPT | Performed by: INTERNAL MEDICINE

## 2021-01-01 PROCEDURE — 85027 COMPLETE CBC AUTOMATED: CPT | Performed by: PHYSICIAN ASSISTANT

## 2021-01-01 PROCEDURE — 99306 1ST NF CARE HIGH MDM 50: CPT | Performed by: FAMILY MEDICINE

## 2021-01-01 PROCEDURE — 80076 HEPATIC FUNCTION PANEL: CPT | Performed by: EMERGENCY MEDICINE

## 2021-01-01 PROCEDURE — 45378 DIAGNOSTIC COLONOSCOPY: CPT | Performed by: INTERNAL MEDICINE

## 2021-01-01 PROCEDURE — G0279 TOMOSYNTHESIS, MAMMO: HCPCS

## 2021-01-01 PROCEDURE — 83935 ASSAY OF URINE OSMOLALITY: CPT | Performed by: INTERNAL MEDICINE

## 2021-01-01 PROCEDURE — 97163 PT EVAL HIGH COMPLEX 45 MIN: CPT

## 2021-01-01 PROCEDURE — 1160F RVW MEDS BY RX/DR IN RCRD: CPT | Performed by: INTERNAL MEDICINE

## 2021-01-01 PROCEDURE — 83540 ASSAY OF IRON: CPT | Performed by: INTERNAL MEDICINE

## 2021-01-01 PROCEDURE — 80048 BASIC METABOLIC PNL TOTAL CA: CPT | Performed by: FAMILY MEDICINE

## 2021-01-01 PROCEDURE — 99223 1ST HOSP IP/OBS HIGH 75: CPT | Performed by: STUDENT IN AN ORGANIZED HEALTH CARE EDUCATION/TRAINING PROGRAM

## 2021-01-01 PROCEDURE — 92610 EVALUATE SWALLOWING FUNCTION: CPT

## 2021-01-01 PROCEDURE — G0008 ADMIN INFLUENZA VIRUS VAC: HCPCS | Performed by: INTERNAL MEDICINE

## 2021-01-01 PROCEDURE — 90662 IIV NO PRSV INCREASED AG IM: CPT | Performed by: INTERNAL MEDICINE

## 2021-01-01 PROCEDURE — 36415 COLL VENOUS BLD VENIPUNCTURE: CPT | Performed by: INTERNAL MEDICINE

## 2021-01-01 PROCEDURE — 85018 HEMOGLOBIN: CPT | Performed by: INTERNAL MEDICINE

## 2021-01-01 PROCEDURE — 30233N1 TRANSFUSION OF NONAUTOLOGOUS RED BLOOD CELLS INTO PERIPHERAL VEIN, PERCUTANEOUS APPROACH: ICD-10-PCS | Performed by: INTERNAL MEDICINE

## 2021-01-01 PROCEDURE — 84484 ASSAY OF TROPONIN QUANT: CPT | Performed by: STUDENT IN AN ORGANIZED HEALTH CARE EDUCATION/TRAINING PROGRAM

## 2021-01-01 PROCEDURE — 82436 ASSAY OF URINE CHLORIDE: CPT | Performed by: INTERNAL MEDICINE

## 2021-01-01 PROCEDURE — 99225 PR SBSQ OBSERVATION CARE/DAY 25 MINUTES: CPT | Performed by: INTERNAL MEDICINE

## 2021-01-01 PROCEDURE — 99222 1ST HOSP IP/OBS MODERATE 55: CPT | Performed by: INTERNAL MEDICINE

## 2021-01-01 PROCEDURE — 80048 BASIC METABOLIC PNL TOTAL CA: CPT | Performed by: STUDENT IN AN ORGANIZED HEALTH CARE EDUCATION/TRAINING PROGRAM

## 2021-01-01 PROCEDURE — 36600 WITHDRAWAL OF ARTERIAL BLOOD: CPT

## 2021-01-01 PROCEDURE — 0W3P8ZZ CONTROL BLEEDING IN GASTROINTESTINAL TRACT, VIA NATURAL OR ARTIFICIAL OPENING ENDOSCOPIC: ICD-10-PCS | Performed by: INTERNAL MEDICINE

## 2021-01-01 PROCEDURE — 97530 THERAPEUTIC ACTIVITIES: CPT

## 2021-01-01 PROCEDURE — 82140 ASSAY OF AMMONIA: CPT | Performed by: INTERNAL MEDICINE

## 2021-01-01 PROCEDURE — 99285 EMERGENCY DEPT VISIT HI MDM: CPT | Performed by: EMERGENCY MEDICINE

## 2021-01-01 PROCEDURE — 99223 1ST HOSP IP/OBS HIGH 75: CPT | Performed by: PHYSICAL MEDICINE & REHABILITATION

## 2021-01-01 PROCEDURE — 86901 BLOOD TYPING SEROLOGIC RH(D): CPT | Performed by: INTERNAL MEDICINE

## 2021-01-01 PROCEDURE — 99309 SBSQ NF CARE MODERATE MDM 30: CPT | Performed by: NURSE PRACTITIONER

## 2021-01-01 PROCEDURE — 1036F TOBACCO NON-USER: CPT | Performed by: INTERNAL MEDICINE

## 2021-01-01 PROCEDURE — 99222 1ST HOSP IP/OBS MODERATE 55: CPT | Performed by: NURSE PRACTITIONER

## 2021-01-01 PROCEDURE — 0DJD8ZZ INSPECTION OF LOWER INTESTINAL TRACT, VIA NATURAL OR ARTIFICIAL OPENING ENDOSCOPIC: ICD-10-PCS | Performed by: INTERNAL MEDICINE

## 2021-01-01 PROCEDURE — 86901 BLOOD TYPING SEROLOGIC RH(D): CPT | Performed by: EMERGENCY MEDICINE

## 2021-01-01 PROCEDURE — 0DJ08ZZ INSPECTION OF UPPER INTESTINAL TRACT, VIA NATURAL OR ARTIFICIAL OPENING ENDOSCOPIC: ICD-10-PCS | Performed by: INTERNAL MEDICINE

## 2021-01-01 PROCEDURE — 84550 ASSAY OF BLOOD/URIC ACID: CPT | Performed by: INTERNAL MEDICINE

## 2021-01-01 PROCEDURE — 99204 OFFICE O/P NEW MOD 45 MIN: CPT | Performed by: INTERNAL MEDICINE

## 2021-01-01 PROCEDURE — 99284 EMERGENCY DEPT VISIT MOD MDM: CPT | Performed by: EMERGENCY MEDICINE

## 2021-01-01 PROCEDURE — 99223 1ST HOSP IP/OBS HIGH 75: CPT | Performed by: ORTHOPAEDIC SURGERY

## 2021-01-01 PROCEDURE — 99220 PR INITIAL OBSERVATION CARE/DAY 70 MINUTES: CPT | Performed by: STUDENT IN AN ORGANIZED HEALTH CARE EDUCATION/TRAINING PROGRAM

## 2021-01-01 PROCEDURE — 0QS706Z REPOSITION LEFT UPPER FEMUR WITH INTRAMEDULLARY INTERNAL FIXATION DEVICE, OPEN APPROACH: ICD-10-PCS | Performed by: ORTHOPAEDIC SURGERY

## 2021-01-01 PROCEDURE — 93925 LOWER EXTREMITY STUDY: CPT | Performed by: SURGERY

## 2021-01-01 PROCEDURE — 84100 ASSAY OF PHOSPHORUS: CPT | Performed by: INTERNAL MEDICINE

## 2021-01-01 PROCEDURE — 93925 LOWER EXTREMITY STUDY: CPT

## 2021-01-01 PROCEDURE — 99305 1ST NF CARE MODERATE MDM 35: CPT | Performed by: NURSE PRACTITIONER

## 2021-01-01 PROCEDURE — 84133 ASSAY OF URINE POTASSIUM: CPT | Performed by: INTERNAL MEDICINE

## 2021-01-01 PROCEDURE — 82746 ASSAY OF FOLIC ACID SERUM: CPT | Performed by: INTERNAL MEDICINE

## 2021-01-01 PROCEDURE — 83880 ASSAY OF NATRIURETIC PEPTIDE: CPT | Performed by: EMERGENCY MEDICINE

## 2021-01-01 PROCEDURE — 72125 CT NECK SPINE W/O DYE: CPT

## 2021-01-01 PROCEDURE — 87186 SC STD MICRODIL/AGAR DIL: CPT | Performed by: INTERNAL MEDICINE

## 2021-01-01 PROCEDURE — 99217 PR OBSERVATION CARE DISCHARGE MANAGEMENT: CPT | Performed by: STUDENT IN AN ORGANIZED HEALTH CARE EDUCATION/TRAINING PROGRAM

## 2021-01-01 PROCEDURE — 83605 ASSAY OF LACTIC ACID: CPT | Performed by: EMERGENCY MEDICINE

## 2021-01-01 PROCEDURE — 85027 COMPLETE CBC AUTOMATED: CPT | Performed by: FAMILY MEDICINE

## 2021-01-01 PROCEDURE — 76642 ULTRASOUND BREAST LIMITED: CPT

## 2021-01-01 PROCEDURE — 82533 TOTAL CORTISOL: CPT | Performed by: INTERNAL MEDICINE

## 2021-01-01 PROCEDURE — 86850 RBC ANTIBODY SCREEN: CPT | Performed by: INTERNAL MEDICINE

## 2021-01-01 DEVICE — 5.0MM TI LOCKING SCREW W/T25 STARDRIVE 36MM F/IM NAIL-STER: Type: IMPLANTABLE DEVICE | Site: HIP | Status: FUNCTIONAL

## 2021-01-01 DEVICE — 11MM/130 DEG TI CANN TFNA 340MM/LEFT - STERILE
Type: IMPLANTABLE DEVICE | Site: HIP | Status: FUNCTIONAL
Brand: TFN-ADVANCE

## 2021-01-01 DEVICE — TFNA FENESTRATED HELICAL BLADE 85MM - STERILE
Type: IMPLANTABLE DEVICE | Site: HIP | Status: FUNCTIONAL
Brand: TFN-ADVANCE

## 2021-01-01 RX ORDER — HYDROCORTISONE ACETATE 25 MG/1
25 SUPPOSITORY RECTAL
Status: DISCONTINUED | OUTPATIENT
Start: 2021-01-01 | End: 2021-01-01 | Stop reason: HOSPADM

## 2021-01-01 RX ORDER — ROCURONIUM BROMIDE 10 MG/ML
INJECTION, SOLUTION INTRAVENOUS AS NEEDED
Status: DISCONTINUED | OUTPATIENT
Start: 2021-01-01 | End: 2021-01-01

## 2021-01-01 RX ORDER — CHLORHEXIDINE GLUCONATE 0.12 MG/ML
15 RINSE ORAL ONCE
Status: COMPLETED | OUTPATIENT
Start: 2021-01-01 | End: 2021-01-01

## 2021-01-01 RX ORDER — LIDOCAINE HYDROCHLORIDE 20 MG/ML
INJECTION, SOLUTION EPIDURAL; INFILTRATION; INTRACAUDAL; PERINEURAL AS NEEDED
Status: DISCONTINUED | OUTPATIENT
Start: 2021-01-01 | End: 2021-01-01

## 2021-01-01 RX ORDER — ALPRAZOLAM 0.25 MG/1
0.25 TABLET ORAL DAILY PRN
Qty: 7 TABLET | Refills: 0 | Status: SHIPPED | OUTPATIENT
Start: 2021-01-01

## 2021-01-01 RX ORDER — HEPARIN SODIUM 5000 [USP'U]/ML
5000 INJECTION, SOLUTION INTRAVENOUS; SUBCUTANEOUS EVERY 8 HOURS SCHEDULED
Status: DISCONTINUED | OUTPATIENT
Start: 2021-01-01 | End: 2021-01-01

## 2021-01-01 RX ORDER — EPHEDRINE SULFATE 50 MG/ML
INJECTION INTRAVENOUS AS NEEDED
Status: DISCONTINUED | OUTPATIENT
Start: 2021-01-01 | End: 2021-01-01

## 2021-01-01 RX ORDER — ISOPROPYL ALCOHOL 0.7 ML/ML
SWAB TOPICAL 2 TIMES DAILY
COMMUNITY
Start: 2021-06-09

## 2021-01-01 RX ORDER — POLYETHYLENE GLYCOL 3350 17 G/17G
238 POWDER, FOR SOLUTION ORAL ONCE
Status: DISCONTINUED | OUTPATIENT
Start: 2021-01-01 | End: 2021-01-01

## 2021-01-01 RX ORDER — ONDANSETRON 2 MG/ML
4 INJECTION INTRAMUSCULAR; INTRAVENOUS EVERY 6 HOURS PRN
Status: DISCONTINUED | OUTPATIENT
Start: 2021-01-01 | End: 2021-01-01 | Stop reason: HOSPADM

## 2021-01-01 RX ORDER — CEFAZOLIN SODIUM 2 G/50ML
2000 SOLUTION INTRAVENOUS ONCE
Status: COMPLETED | OUTPATIENT
Start: 2021-01-01 | End: 2021-01-01

## 2021-01-01 RX ORDER — LISINOPRIL 2.5 MG/1
2.5 TABLET ORAL DAILY
Status: CANCELLED | OUTPATIENT
Start: 2021-01-01

## 2021-01-01 RX ORDER — LABETALOL 20 MG/4 ML (5 MG/ML) INTRAVENOUS SYRINGE
10 EVERY 6 HOURS PRN
Status: DISCONTINUED | OUTPATIENT
Start: 2021-01-01 | End: 2021-01-01 | Stop reason: HOSPADM

## 2021-01-01 RX ORDER — ACETAMINOPHEN 325 MG/1
650 TABLET ORAL EVERY 6 HOURS PRN
Status: DISCONTINUED | OUTPATIENT
Start: 2021-01-01 | End: 2021-01-01 | Stop reason: HOSPADM

## 2021-01-01 RX ORDER — DULOXETIN HYDROCHLORIDE 60 MG/1
CAPSULE, DELAYED RELEASE ORAL
Qty: 180 CAPSULE | Refills: 0 | Status: SHIPPED | OUTPATIENT
Start: 2021-01-01 | End: 2021-01-01 | Stop reason: SDUPTHER

## 2021-01-01 RX ORDER — ALBUTEROL SULFATE 90 UG/1
2 AEROSOL, METERED RESPIRATORY (INHALATION) EVERY 4 HOURS PRN
Status: DISCONTINUED | OUTPATIENT
Start: 2021-01-01 | End: 2021-01-01 | Stop reason: HOSPADM

## 2021-01-01 RX ORDER — FLASH GLUCOSE SENSOR
1 KIT MISCELLANEOUS 4 TIMES DAILY
Qty: 1 EACH | Refills: 6 | Status: SHIPPED | OUTPATIENT
Start: 2021-01-01 | End: 2021-01-01 | Stop reason: SDUPTHER

## 2021-01-01 RX ORDER — GABAPENTIN 100 MG/1
100 CAPSULE ORAL 3 TIMES DAILY
Qty: 42 CAPSULE | Refills: 0 | Status: SHIPPED | OUTPATIENT
Start: 2021-01-01 | End: 2021-01-01

## 2021-01-01 RX ORDER — POLYETHYLENE GLYCOL 3350 17 G/17G
238 POWDER, FOR SOLUTION ORAL ONCE
Status: COMPLETED | OUTPATIENT
Start: 2021-01-01 | End: 2021-01-01

## 2021-01-01 RX ORDER — PRAVASTATIN SODIUM 80 MG/1
80 TABLET ORAL
Status: DISCONTINUED | OUTPATIENT
Start: 2021-01-01 | End: 2021-01-01

## 2021-01-01 RX ORDER — HYDROCORTISONE ACETATE 25 MG/1
25 SUPPOSITORY RECTAL
Status: CANCELLED | OUTPATIENT
Start: 2021-01-01

## 2021-01-01 RX ORDER — MAGNESIUM HYDROXIDE 1200 MG/15ML
LIQUID ORAL AS NEEDED
Status: DISCONTINUED | OUTPATIENT
Start: 2021-01-01 | End: 2021-01-01 | Stop reason: HOSPADM

## 2021-01-01 RX ORDER — NITROGLYCERIN 0.4 MG/1
0.4 TABLET SUBLINGUAL
Status: DISCONTINUED | OUTPATIENT
Start: 2021-01-01 | End: 2021-01-01 | Stop reason: HOSPADM

## 2021-01-01 RX ORDER — INSULIN DETEMIR 100 [IU]/ML
15 INJECTION, SOLUTION SUBCUTANEOUS DAILY
Qty: 3 ML | Refills: 0 | Status: SHIPPED | OUTPATIENT
Start: 2021-01-01 | End: 2021-01-01 | Stop reason: SDUPTHER

## 2021-01-01 RX ORDER — OXYCODONE HYDROCHLORIDE 5 MG/1
5 TABLET ORAL EVERY 4 HOURS PRN
Status: DISCONTINUED | OUTPATIENT
Start: 2021-01-01 | End: 2021-01-01 | Stop reason: HOSPADM

## 2021-01-01 RX ORDER — ALBUMIN, HUMAN INJ 5% 5 %
SOLUTION INTRAVENOUS CONTINUOUS PRN
Status: DISCONTINUED | OUTPATIENT
Start: 2021-01-01 | End: 2021-01-01

## 2021-01-01 RX ORDER — BLOOD-GLUCOSE METER
EACH MISCELLANEOUS 2 TIMES DAILY
COMMUNITY
Start: 2021-06-09

## 2021-01-01 RX ORDER — OXYBUTYNIN CHLORIDE 5 MG/1
5 TABLET, EXTENDED RELEASE ORAL DAILY
Refills: 3 | Status: DISCONTINUED | OUTPATIENT
Start: 2021-01-01 | End: 2021-01-01

## 2021-01-01 RX ORDER — ONDANSETRON 2 MG/ML
4 INJECTION INTRAMUSCULAR; INTRAVENOUS ONCE
Status: COMPLETED | OUTPATIENT
Start: 2021-01-01 | End: 2021-01-01

## 2021-01-01 RX ORDER — PRAVASTATIN SODIUM 80 MG/1
80 TABLET ORAL
Status: CANCELLED | OUTPATIENT
Start: 2021-01-01

## 2021-01-01 RX ORDER — PANTOPRAZOLE SODIUM 40 MG/1
40 TABLET, DELAYED RELEASE ORAL
COMMUNITY
Start: 2021-01-01 | End: 2021-01-01 | Stop reason: HOSPADM

## 2021-01-01 RX ORDER — NICOTINE 21 MG/24HR
1 PATCH, TRANSDERMAL 24 HOURS TRANSDERMAL EVERY 24 HOURS
COMMUNITY
End: 2022-01-01

## 2021-01-01 RX ORDER — MORPHINE SULFATE 10 MG/ML
6 INJECTION, SOLUTION INTRAMUSCULAR; INTRAVENOUS ONCE
Status: COMPLETED | OUTPATIENT
Start: 2021-01-01 | End: 2021-01-01

## 2021-01-01 RX ORDER — PANTOPRAZOLE SODIUM 40 MG/1
40 TABLET, DELAYED RELEASE ORAL
Status: DISCONTINUED | OUTPATIENT
Start: 2021-01-01 | End: 2021-01-01 | Stop reason: HOSPADM

## 2021-01-01 RX ORDER — LABETALOL 20 MG/4 ML (5 MG/ML) INTRAVENOUS SYRINGE
10 EVERY 6 HOURS PRN
Status: CANCELLED | OUTPATIENT
Start: 2021-01-01

## 2021-01-01 RX ORDER — GABAPENTIN 100 MG/1
CAPSULE ORAL
Qty: 270 CAPSULE | OUTPATIENT
Start: 2021-01-01

## 2021-01-01 RX ORDER — SIMVASTATIN 40 MG
40 TABLET ORAL DAILY
Qty: 90 TABLET | Refills: 3 | Status: SHIPPED | OUTPATIENT
Start: 2021-01-01 | End: 2022-01-01

## 2021-01-01 RX ORDER — PANTOPRAZOLE SODIUM 40 MG/1
40 INJECTION, POWDER, FOR SOLUTION INTRAVENOUS EVERY 12 HOURS
Status: DISCONTINUED | OUTPATIENT
Start: 2021-01-01 | End: 2021-01-01

## 2021-01-01 RX ORDER — SULFAMETHOXAZOLE AND TRIMETHOPRIM 800; 160 MG/1; MG/1
0.5 TABLET ORAL EVERY 12 HOURS SCHEDULED
Qty: 7 TABLET | Refills: 0
Start: 2021-01-01 | End: 2021-01-01 | Stop reason: ALTCHOICE

## 2021-01-01 RX ORDER — HEPARIN SODIUM 5000 [USP'U]/ML
5000 INJECTION, SOLUTION INTRAVENOUS; SUBCUTANEOUS EVERY 12 HOURS SCHEDULED
Status: DISCONTINUED | OUTPATIENT
Start: 2021-01-01 | End: 2021-01-01 | Stop reason: HOSPADM

## 2021-01-01 RX ORDER — LIDOCAINE 50 MG/G
1 PATCH TOPICAL DAILY
Qty: 30 PATCH | Refills: 0
Start: 2021-01-01 | End: 2021-01-01

## 2021-01-01 RX ORDER — GABAPENTIN 100 MG/1
100 CAPSULE ORAL 3 TIMES DAILY
Status: DISCONTINUED | OUTPATIENT
Start: 2021-01-01 | End: 2021-01-01 | Stop reason: HOSPADM

## 2021-01-01 RX ORDER — CHOLESTYRAMINE 4 G/9G
POWDER, FOR SUSPENSION ORAL
COMMUNITY
End: 2021-01-01 | Stop reason: HOSPADM

## 2021-01-01 RX ORDER — DULOXETIN HYDROCHLORIDE 60 MG/1
CAPSULE, DELAYED RELEASE ORAL
Qty: 180 CAPSULE | Refills: 1 | Status: SHIPPED | OUTPATIENT
Start: 2021-01-01 | End: 2021-01-01

## 2021-01-01 RX ORDER — TRAMADOL HYDROCHLORIDE 50 MG/1
50 TABLET ORAL EVERY 8 HOURS PRN
Qty: 20 TABLET | Refills: 0 | Status: SHIPPED | OUTPATIENT
Start: 2021-01-01

## 2021-01-01 RX ORDER — TIOTROPIUM BROMIDE AND OLODATEROL 3.124; 2.736 UG/1; UG/1
2 SPRAY, METERED RESPIRATORY (INHALATION) DAILY
Qty: 4 G | Refills: 0 | Status: SHIPPED | OUTPATIENT
Start: 2021-01-01 | End: 2021-01-01 | Stop reason: SDUPTHER

## 2021-01-01 RX ORDER — INSULIN GLARGINE 100 [IU]/ML
5 INJECTION, SOLUTION SUBCUTANEOUS
Status: DISCONTINUED | OUTPATIENT
Start: 2021-01-01 | End: 2021-01-01 | Stop reason: HOSPADM

## 2021-01-01 RX ORDER — SODIUM CHLORIDE 9 MG/ML
75 INJECTION, SOLUTION INTRAVENOUS CONTINUOUS
Status: DISCONTINUED | OUTPATIENT
Start: 2021-01-01 | End: 2021-01-01 | Stop reason: HOSPADM

## 2021-01-01 RX ORDER — GABAPENTIN 100 MG/1
100 CAPSULE ORAL 2 TIMES DAILY
Status: DISCONTINUED | OUTPATIENT
Start: 2021-01-01 | End: 2021-01-01 | Stop reason: HOSPADM

## 2021-01-01 RX ORDER — TORSEMIDE 20 MG/1
20 TABLET ORAL DAILY
COMMUNITY
Start: 2021-01-01 | End: 2021-01-01 | Stop reason: HOSPADM

## 2021-01-01 RX ORDER — LISINOPRIL 2.5 MG/1
2.5 TABLET ORAL DAILY
Status: DISCONTINUED | OUTPATIENT
Start: 2021-01-01 | End: 2021-01-01

## 2021-01-01 RX ORDER — INSULIN GLARGINE 100 [IU]/ML
10 INJECTION, SOLUTION SUBCUTANEOUS EVERY MORNING
Status: DISCONTINUED | OUTPATIENT
Start: 2021-01-01 | End: 2021-01-01

## 2021-01-01 RX ORDER — NEOSTIGMINE METHYLSULFATE 1 MG/ML
INJECTION INTRAVENOUS AS NEEDED
Status: DISCONTINUED | OUTPATIENT
Start: 2021-01-01 | End: 2021-01-01

## 2021-01-01 RX ORDER — SIMVASTATIN 40 MG
40 TABLET ORAL DAILY
Qty: 90 TABLET | Refills: 0 | Status: SHIPPED | OUTPATIENT
Start: 2021-01-01 | End: 2021-01-01 | Stop reason: SDUPTHER

## 2021-01-01 RX ORDER — POTASSIUM CHLORIDE 750 MG/1
10 TABLET, FILM COATED, EXTENDED RELEASE ORAL 2 TIMES DAILY
COMMUNITY
Start: 2021-01-01 | End: 2021-01-01 | Stop reason: HOSPADM

## 2021-01-01 RX ORDER — DULOXETIN HYDROCHLORIDE 60 MG/1
60 CAPSULE, DELAYED RELEASE ORAL 2 TIMES DAILY
Qty: 60 CAPSULE | Refills: 3 | Status: SHIPPED | OUTPATIENT
Start: 2021-01-01 | End: 2022-01-01 | Stop reason: SDUPTHER

## 2021-01-01 RX ORDER — TORSEMIDE 20 MG/1
20 TABLET ORAL DAILY
Status: DISCONTINUED | OUTPATIENT
Start: 2021-01-01 | End: 2021-01-01

## 2021-01-01 RX ORDER — ALBUTEROL SULFATE 2.5 MG/3ML
SOLUTION RESPIRATORY (INHALATION)
COMMUNITY
Start: 2021-01-01 | End: 2021-01-01 | Stop reason: ALTCHOICE

## 2021-01-01 RX ORDER — HYDROMORPHONE HCL 110MG/55ML
PATIENT CONTROLLED ANALGESIA SYRINGE INTRAVENOUS AS NEEDED
Status: DISCONTINUED | OUTPATIENT
Start: 2021-01-01 | End: 2021-01-01

## 2021-01-01 RX ORDER — OMEPRAZOLE 20 MG/1
20 CAPSULE, DELAYED RELEASE ORAL DAILY
COMMUNITY
End: 2021-01-01 | Stop reason: HOSPADM

## 2021-01-01 RX ORDER — INSULIN GLARGINE 100 [IU]/ML
15 INJECTION, SOLUTION SUBCUTANEOUS DAILY
COMMUNITY
End: 2021-01-01 | Stop reason: ALTCHOICE

## 2021-01-01 RX ORDER — MULTIVIT-MIN/IRON FUM/FOLIC AC 7.5 MG-4
1 TABLET ORAL DAILY
COMMUNITY

## 2021-01-01 RX ORDER — DULOXETIN HYDROCHLORIDE 60 MG/1
60 CAPSULE, DELAYED RELEASE ORAL 2 TIMES DAILY
Status: DISCONTINUED | OUTPATIENT
Start: 2021-01-01 | End: 2021-01-01 | Stop reason: HOSPADM

## 2021-01-01 RX ORDER — SODIUM CHLORIDE, SODIUM LACTATE, POTASSIUM CHLORIDE, CALCIUM CHLORIDE 600; 310; 30; 20 MG/100ML; MG/100ML; MG/100ML; MG/100ML
INJECTION, SOLUTION INTRAVENOUS CONTINUOUS PRN
Status: DISCONTINUED | OUTPATIENT
Start: 2021-01-01 | End: 2021-01-01

## 2021-01-01 RX ORDER — FERROUS SULFATE 325(65) MG
325 TABLET ORAL
Status: DISCONTINUED | OUTPATIENT
Start: 2021-01-01 | End: 2021-01-01 | Stop reason: HOSPADM

## 2021-01-01 RX ORDER — COLLAGENASE SANTYL 250 [ARB'U]/G
OINTMENT TOPICAL
COMMUNITY
Start: 2021-01-01 | End: 2021-01-01 | Stop reason: ALTCHOICE

## 2021-01-01 RX ORDER — DULOXETIN HYDROCHLORIDE 60 MG/1
60 CAPSULE, DELAYED RELEASE ORAL 2 TIMES DAILY
Qty: 28 CAPSULE | Refills: 0 | Status: SHIPPED | OUTPATIENT
Start: 2021-01-01 | End: 2021-01-01 | Stop reason: SDUPTHER

## 2021-01-01 RX ORDER — LIDOCAINE 50 MG/G
2 PATCH TOPICAL DAILY
Qty: 30 PATCH | Refills: 0 | Status: SHIPPED | OUTPATIENT
Start: 2021-01-01 | End: 2022-01-01 | Stop reason: SDUPTHER

## 2021-01-01 RX ORDER — LISINOPRIL 2.5 MG/1
2.5 TABLET ORAL DAILY
COMMUNITY
Start: 2021-01-01 | End: 2021-01-01 | Stop reason: SDUPTHER

## 2021-01-01 RX ORDER — DIPHENHYDRAMINE HYDROCHLORIDE 50 MG/ML
50 INJECTION INTRAMUSCULAR; INTRAVENOUS ONCE
Status: COMPLETED | OUTPATIENT
Start: 2021-01-01 | End: 2021-01-01

## 2021-01-01 RX ORDER — CEFAZOLIN SODIUM 1 G/50ML
1000 SOLUTION INTRAVENOUS ONCE
Status: COMPLETED | OUTPATIENT
Start: 2021-01-01 | End: 2021-01-01

## 2021-01-01 RX ORDER — TIOTROPIUM BROMIDE AND OLODATEROL 3.124; 2.736 UG/1; UG/1
2 SPRAY, METERED RESPIRATORY (INHALATION) DAILY
Qty: 4 G | Refills: 3 | Status: SHIPPED | OUTPATIENT
Start: 2021-01-01 | End: 2022-01-01

## 2021-01-01 RX ORDER — DULOXETIN HYDROCHLORIDE 60 MG/1
60 CAPSULE, DELAYED RELEASE ORAL 2 TIMES DAILY
Status: CANCELLED | OUTPATIENT
Start: 2021-01-01

## 2021-01-01 RX ORDER — BISACODYL 10 MG
10 SUPPOSITORY, RECTAL RECTAL ONCE
Status: DISCONTINUED | OUTPATIENT
Start: 2021-01-01 | End: 2021-01-01 | Stop reason: HOSPADM

## 2021-01-01 RX ORDER — PROPOFOL 10 MG/ML
INJECTION, EMULSION INTRAVENOUS AS NEEDED
Status: DISCONTINUED | OUTPATIENT
Start: 2021-01-01 | End: 2021-01-01

## 2021-01-01 RX ORDER — GABAPENTIN 100 MG/1
100 CAPSULE ORAL
COMMUNITY
End: 2021-01-01 | Stop reason: SDUPTHER

## 2021-01-01 RX ORDER — PRAVASTATIN SODIUM 80 MG/1
80 TABLET ORAL
Status: DISCONTINUED | OUTPATIENT
Start: 2021-01-01 | End: 2021-01-01 | Stop reason: HOSPADM

## 2021-01-01 RX ORDER — FENTANYL CITRATE 50 UG/ML
INJECTION, SOLUTION INTRAMUSCULAR; INTRAVENOUS AS NEEDED
Status: DISCONTINUED | OUTPATIENT
Start: 2021-01-01 | End: 2021-01-01

## 2021-01-01 RX ORDER — SODIUM CHLORIDE, SODIUM LACTATE, POTASSIUM CHLORIDE, CALCIUM CHLORIDE 600; 310; 30; 20 MG/100ML; MG/100ML; MG/100ML; MG/100ML
50 INJECTION, SOLUTION INTRAVENOUS CONTINUOUS
Status: CANCELLED | OUTPATIENT
Start: 2021-01-01

## 2021-01-01 RX ORDER — HYDROMORPHONE HCL/PF 1 MG/ML
0.6 SYRINGE (ML) INJECTION ONCE
Status: COMPLETED | OUTPATIENT
Start: 2021-01-01 | End: 2021-01-01

## 2021-01-01 RX ORDER — PANTOPRAZOLE SODIUM 40 MG/1
40 TABLET, DELAYED RELEASE ORAL
Status: DISCONTINUED | OUTPATIENT
Start: 2021-01-01 | End: 2021-01-01

## 2021-01-01 RX ORDER — INSULIN GLARGINE 100 [IU]/ML
13 INJECTION, SOLUTION SUBCUTANEOUS
COMMUNITY
Start: 2021-01-01 | End: 2021-01-01 | Stop reason: HOSPADM

## 2021-01-01 RX ORDER — ALBUTEROL SULFATE 2.5 MG/3ML
2.5 SOLUTION RESPIRATORY (INHALATION) EVERY 4 HOURS PRN
Status: DISCONTINUED | OUTPATIENT
Start: 2021-01-01 | End: 2021-01-01 | Stop reason: HOSPADM

## 2021-01-01 RX ORDER — TORSEMIDE 20 MG/1
20 TABLET ORAL DAILY
Status: CANCELLED | OUTPATIENT
Start: 2021-01-01

## 2021-01-01 RX ORDER — DOCUSATE SODIUM 100 MG/1
100 CAPSULE, LIQUID FILLED ORAL 2 TIMES DAILY
Status: DISCONTINUED | OUTPATIENT
Start: 2021-01-01 | End: 2021-01-01 | Stop reason: HOSPADM

## 2021-01-01 RX ORDER — ACETAMINOPHEN 325 MG/1
650 TABLET ORAL EVERY 6 HOURS SCHEDULED
Status: DISCONTINUED | OUTPATIENT
Start: 2021-01-01 | End: 2021-01-01 | Stop reason: HOSPADM

## 2021-01-01 RX ORDER — DEXTROSE MONOHYDRATE 25 G/50ML
25 INJECTION, SOLUTION INTRAVENOUS ONCE
Status: COMPLETED | OUTPATIENT
Start: 2021-01-01 | End: 2021-01-01

## 2021-01-01 RX ORDER — FLASH GLUCOSE SENSOR
1 KIT MISCELLANEOUS 4 TIMES DAILY
COMMUNITY
End: 2021-01-01 | Stop reason: SDUPTHER

## 2021-01-01 RX ORDER — LIDOCAINE HYDROCHLORIDE 10 MG/ML
INJECTION, SOLUTION EPIDURAL; INFILTRATION; INTRACAUDAL; PERINEURAL AS NEEDED
Status: DISCONTINUED | OUTPATIENT
Start: 2021-01-01 | End: 2021-01-01

## 2021-01-01 RX ORDER — GABAPENTIN 100 MG/1
100 CAPSULE ORAL 2 TIMES DAILY
Status: CANCELLED | OUTPATIENT
Start: 2021-01-01

## 2021-01-01 RX ORDER — TRAMADOL HYDROCHLORIDE 50 MG/1
50 TABLET ORAL EVERY 6 HOURS PRN
Status: DISCONTINUED | OUTPATIENT
Start: 2021-01-01 | End: 2021-01-01 | Stop reason: HOSPADM

## 2021-01-01 RX ORDER — ALBUTEROL SULFATE 2.5 MG/3ML
2.5 SOLUTION RESPIRATORY (INHALATION) EVERY 4 HOURS PRN
Status: DISCONTINUED | OUTPATIENT
Start: 2021-01-01 | End: 2021-01-01

## 2021-01-01 RX ORDER — DULOXETIN HYDROCHLORIDE 60 MG/1
60 CAPSULE, DELAYED RELEASE ORAL 2 TIMES DAILY
Qty: 28 CAPSULE | Refills: 0 | OUTPATIENT
Start: 2021-01-01

## 2021-01-01 RX ORDER — ESCITALOPRAM OXALATE 10 MG/1
5 TABLET ORAL DAILY
Status: DISCONTINUED | OUTPATIENT
Start: 2021-01-01 | End: 2021-01-01 | Stop reason: HOSPADM

## 2021-01-01 RX ORDER — ALBUTEROL SULFATE 2.5 MG/3ML
2.5 SOLUTION RESPIRATORY (INHALATION) EVERY 4 HOURS PRN
Status: CANCELLED | OUTPATIENT
Start: 2021-01-01

## 2021-01-01 RX ORDER — NICOTINE 21 MG/24HR
1 PATCH, TRANSDERMAL 24 HOURS TRANSDERMAL EVERY 24 HOURS
Status: DISCONTINUED | OUTPATIENT
Start: 2021-01-01 | End: 2021-01-01 | Stop reason: HOSPADM

## 2021-01-01 RX ORDER — LISINOPRIL 5 MG/1
5 TABLET ORAL DAILY
Qty: 30 TABLET | Refills: 5 | Status: SHIPPED | OUTPATIENT
Start: 2021-01-01 | End: 2022-01-01 | Stop reason: SDUPTHER

## 2021-01-01 RX ORDER — POLYETHYLENE GLYCOL 3350 17 G/17G
17 POWDER, FOR SOLUTION ORAL DAILY
Status: DISCONTINUED | OUTPATIENT
Start: 2021-01-01 | End: 2021-01-01 | Stop reason: HOSPADM

## 2021-01-01 RX ORDER — LISINOPRIL 5 MG/1
2.5 TABLET ORAL DAILY
Status: DISCONTINUED | OUTPATIENT
Start: 2021-01-01 | End: 2021-01-01 | Stop reason: HOSPADM

## 2021-01-01 RX ORDER — TORSEMIDE 20 MG/1
20 TABLET ORAL DAILY
Status: DISCONTINUED | OUTPATIENT
Start: 2021-01-01 | End: 2021-01-01 | Stop reason: HOSPADM

## 2021-01-01 RX ORDER — HYDROMORPHONE HCL/PF 1 MG/ML
0.5 SYRINGE (ML) INJECTION
Status: DISCONTINUED | OUTPATIENT
Start: 2021-01-01 | End: 2021-01-01

## 2021-01-01 RX ORDER — ESCITALOPRAM OXALATE 5 MG/1
5 TABLET ORAL DAILY
COMMUNITY
Start: 2021-01-01 | End: 2021-01-01 | Stop reason: SDUPTHER

## 2021-01-01 RX ORDER — MAGNESIUM CARB/ALUMINUM HYDROX 105-160MG
296 TABLET,CHEWABLE ORAL ONCE
Status: DISCONTINUED | OUTPATIENT
Start: 2021-01-01 | End: 2021-01-01

## 2021-01-01 RX ORDER — ALBUTEROL SULFATE 90 UG/1
2 AEROSOL, METERED RESPIRATORY (INHALATION) EVERY 4 HOURS PRN
COMMUNITY

## 2021-01-01 RX ORDER — PANTOPRAZOLE SODIUM 40 MG/1
40 INJECTION, POWDER, FOR SOLUTION INTRAVENOUS ONCE
Status: COMPLETED | OUTPATIENT
Start: 2021-01-01 | End: 2021-01-01

## 2021-01-01 RX ORDER — PSEUDOEPHEDRINE HCL 30 MG
100 TABLET ORAL 2 TIMES DAILY
COMMUNITY
Start: 2021-01-01 | End: 2022-01-01

## 2021-01-01 RX ORDER — FLASH GLUCOSE SENSOR
1 KIT MISCELLANEOUS 4 TIMES DAILY
Qty: 1 EACH | Refills: 6 | Status: SHIPPED | OUTPATIENT
Start: 2021-01-01

## 2021-01-01 RX ORDER — MAGNESIUM CARB/ALUMINUM HYDROX 105-160MG
148 TABLET,CHEWABLE ORAL ONCE
Status: COMPLETED | OUTPATIENT
Start: 2021-01-01 | End: 2021-01-01

## 2021-01-01 RX ORDER — HYDROMORPHONE HCL/PF 1 MG/ML
0.2 SYRINGE (ML) INJECTION
Status: DISCONTINUED | OUTPATIENT
Start: 2021-01-01 | End: 2021-01-01 | Stop reason: HOSPADM

## 2021-01-01 RX ORDER — ESCITALOPRAM OXALATE 10 MG/1
5 TABLET ORAL DAILY
Status: CANCELLED | OUTPATIENT
Start: 2021-01-01 | End: 2022-06-19

## 2021-01-01 RX ORDER — HEPARIN SODIUM 5000 [USP'U]/ML
5000 INJECTION, SOLUTION INTRAVENOUS; SUBCUTANEOUS EVERY 8 HOURS SCHEDULED
Status: DISCONTINUED | OUTPATIENT
Start: 2021-01-01 | End: 2021-01-01 | Stop reason: HOSPADM

## 2021-01-01 RX ORDER — HEPARIN SODIUM 5000 [USP'U]/ML
5000 INJECTION, SOLUTION INTRAVENOUS; SUBCUTANEOUS EVERY 8 HOURS SCHEDULED
Status: CANCELLED | OUTPATIENT
Start: 2021-01-01

## 2021-01-01 RX ORDER — TIOTROPIUM BROMIDE AND OLODATEROL 3.124; 2.736 UG/1; UG/1
SPRAY, METERED RESPIRATORY (INHALATION)
Qty: 4 G | Refills: 0 | OUTPATIENT
Start: 2021-01-01

## 2021-01-01 RX ORDER — INSULIN GLARGINE 100 [IU]/ML
10 INJECTION, SOLUTION SUBCUTANEOUS
Status: CANCELLED | OUTPATIENT
Start: 2021-01-01

## 2021-01-01 RX ORDER — OXYCODONE HYDROCHLORIDE 5 MG/1
5 TABLET ORAL EVERY 6 HOURS PRN
Status: DISCONTINUED | OUTPATIENT
Start: 2021-01-01 | End: 2021-01-01 | Stop reason: HOSPADM

## 2021-01-01 RX ORDER — GABAPENTIN 100 MG/1
100 CAPSULE ORAL 3 TIMES DAILY
Status: DISCONTINUED | OUTPATIENT
Start: 2021-01-01 | End: 2021-01-01

## 2021-01-01 RX ORDER — SIMVASTATIN 40 MG
40 TABLET ORAL DAILY
Qty: 14 TABLET | Refills: 0 | Status: SHIPPED | OUTPATIENT
Start: 2021-01-01 | End: 2021-01-01

## 2021-01-01 RX ORDER — LIDOCAINE 50 MG/G
1 PATCH TOPICAL DAILY
Qty: 30 PATCH | Refills: 0
Start: 2021-01-01 | End: 2021-01-01 | Stop reason: SDUPTHER

## 2021-01-01 RX ORDER — ALBUTEROL SULFATE 2.5 MG/3ML
2.5 SOLUTION RESPIRATORY (INHALATION) EVERY 4 HOURS PRN
COMMUNITY
Start: 2021-05-20 | End: 2021-01-01 | Stop reason: ALTCHOICE

## 2021-01-01 RX ORDER — GLYCOPYRROLATE 0.2 MG/ML
INJECTION INTRAMUSCULAR; INTRAVENOUS AS NEEDED
Status: DISCONTINUED | OUTPATIENT
Start: 2021-01-01 | End: 2021-01-01

## 2021-01-01 RX ORDER — LIDOCAINE 50 MG/G
1 PATCH TOPICAL DAILY
Status: DISCONTINUED | OUTPATIENT
Start: 2021-01-01 | End: 2021-01-01 | Stop reason: HOSPADM

## 2021-01-01 RX ORDER — FENTANYL CITRATE/PF 50 MCG/ML
25 SYRINGE (ML) INJECTION
Status: DISCONTINUED | OUTPATIENT
Start: 2021-01-01 | End: 2021-01-01 | Stop reason: HOSPADM

## 2021-01-01 RX ORDER — DIPHENHYDRAMINE HYDROCHLORIDE 50 MG/ML
12.5 INJECTION INTRAMUSCULAR; INTRAVENOUS ONCE AS NEEDED
Status: DISCONTINUED | OUTPATIENT
Start: 2021-01-01 | End: 2021-01-01 | Stop reason: HOSPADM

## 2021-01-01 RX ORDER — PRAVASTATIN SODIUM 40 MG
40 TABLET ORAL
Status: DISCONTINUED | OUTPATIENT
Start: 2021-01-01 | End: 2021-01-01 | Stop reason: HOSPADM

## 2021-01-01 RX ORDER — PANTOPRAZOLE SODIUM 40 MG/1
40 TABLET, DELAYED RELEASE ORAL 2 TIMES DAILY
Qty: 28 TABLET | Refills: 0 | Status: SHIPPED | OUTPATIENT
Start: 2021-01-01

## 2021-01-01 RX ORDER — ESCITALOPRAM OXALATE 5 MG/1
5 TABLET ORAL DAILY
Status: DISCONTINUED | OUTPATIENT
Start: 2021-01-01 | End: 2021-01-01

## 2021-01-01 RX ORDER — DULOXETIN HYDROCHLORIDE 60 MG/1
60 CAPSULE, DELAYED RELEASE ORAL 2 TIMES DAILY
Status: DISCONTINUED | OUTPATIENT
Start: 2021-01-01 | End: 2021-01-01

## 2021-01-01 RX ORDER — INSULIN DETEMIR 100 [IU]/ML
20 INJECTION, SOLUTION SUBCUTANEOUS DAILY
Qty: 3 ML | Refills: 0
Start: 2021-01-01 | End: 2021-01-01 | Stop reason: SDUPTHER

## 2021-01-01 RX ORDER — TRAMADOL HYDROCHLORIDE 50 MG/1
50 TABLET ORAL EVERY 4 HOURS PRN
Status: DISCONTINUED | OUTPATIENT
Start: 2021-01-01 | End: 2021-01-01

## 2021-01-01 RX ORDER — PANTOPRAZOLE SODIUM 40 MG/1
40 TABLET, DELAYED RELEASE ORAL
Status: CANCELLED | OUTPATIENT
Start: 2021-01-01

## 2021-01-01 RX ORDER — LABETALOL 20 MG/4 ML (5 MG/ML) INTRAVENOUS SYRINGE
AS NEEDED
Status: DISCONTINUED | OUTPATIENT
Start: 2021-01-01 | End: 2021-01-01

## 2021-01-01 RX ORDER — PANTOPRAZOLE SODIUM 40 MG/1
40 TABLET, DELAYED RELEASE ORAL 2 TIMES DAILY
Refills: 0
Start: 2021-01-01 | End: 2021-01-01 | Stop reason: SDUPTHER

## 2021-01-01 RX ORDER — ONDANSETRON 2 MG/ML
4 INJECTION INTRAMUSCULAR; INTRAVENOUS ONCE AS NEEDED
Status: DISCONTINUED | OUTPATIENT
Start: 2021-01-01 | End: 2021-01-01 | Stop reason: HOSPADM

## 2021-01-01 RX ORDER — HYDROCORTISONE ACETATE 25 MG/1
25 SUPPOSITORY RECTAL 2 TIMES DAILY
Status: DISCONTINUED | OUTPATIENT
Start: 2021-01-01 | End: 2021-01-01

## 2021-01-01 RX ORDER — TIOTROPIUM BROMIDE AND OLODATEROL 3.124; 2.736 UG/1; UG/1
SPRAY, METERED RESPIRATORY (INHALATION)
COMMUNITY
Start: 2021-06-12 | End: 2021-01-01 | Stop reason: SDUPTHER

## 2021-01-01 RX ORDER — OXYCODONE HYDROCHLORIDE 5 MG/1
2.5 TABLET ORAL EVERY 4 HOURS PRN
Status: DISCONTINUED | OUTPATIENT
Start: 2021-01-01 | End: 2021-01-01 | Stop reason: HOSPADM

## 2021-01-01 RX ORDER — POTASSIUM CHLORIDE 20 MEQ/1
20 TABLET, EXTENDED RELEASE ORAL ONCE
Status: COMPLETED | OUTPATIENT
Start: 2021-01-01 | End: 2021-01-01

## 2021-01-01 RX ORDER — ACETAMINOPHEN 325 MG/1
325 TABLET ORAL EVERY 4 HOURS PRN
COMMUNITY
End: 2021-01-01 | Stop reason: ALTCHOICE

## 2021-01-01 RX ORDER — LIDOCAINE 50 MG/G
1 PATCH TOPICAL DAILY
COMMUNITY
End: 2021-01-01 | Stop reason: SDUPTHER

## 2021-01-01 RX ORDER — INSULIN GLARGINE 100 [IU]/ML
10 INJECTION, SOLUTION SUBCUTANEOUS
Status: DISCONTINUED | OUTPATIENT
Start: 2021-01-01 | End: 2021-01-01 | Stop reason: HOSPADM

## 2021-01-01 RX ORDER — BISACODYL 10 MG
10 SUPPOSITORY, RECTAL RECTAL DAILY PRN
Status: DISCONTINUED | OUTPATIENT
Start: 2021-01-01 | End: 2021-01-01 | Stop reason: HOSPADM

## 2021-01-01 RX ORDER — LISINOPRIL 2.5 MG/1
2.5 TABLET ORAL DAILY
Status: DISCONTINUED | OUTPATIENT
Start: 2021-01-01 | End: 2021-01-01 | Stop reason: HOSPADM

## 2021-01-01 RX ORDER — INSULIN LISPRO 100 [IU]/ML
INJECTION, SOLUTION INTRAVENOUS; SUBCUTANEOUS 3 TIMES DAILY PRN
COMMUNITY
End: 2021-01-01 | Stop reason: ALTCHOICE

## 2021-01-01 RX ORDER — INSULIN DETEMIR 100 [IU]/ML
25 INJECTION, SOLUTION SUBCUTANEOUS DAILY
Qty: 15 ML | Refills: 0
Start: 2021-01-01 | End: 2022-01-01 | Stop reason: SDUPTHER

## 2021-01-01 RX ORDER — HYDRALAZINE HYDROCHLORIDE 20 MG/ML
5 INJECTION INTRAMUSCULAR; INTRAVENOUS EVERY 6 HOURS PRN
Status: DISCONTINUED | OUTPATIENT
Start: 2021-01-01 | End: 2021-01-01 | Stop reason: HOSPADM

## 2021-01-01 RX ORDER — ACETAMINOPHEN 325 MG/1
650 TABLET ORAL EVERY 6 HOURS PRN
Status: CANCELLED | OUTPATIENT
Start: 2021-01-01

## 2021-01-01 RX ORDER — SENNOSIDES 8.6 MG
1 TABLET ORAL DAILY
Status: DISCONTINUED | OUTPATIENT
Start: 2021-01-01 | End: 2021-01-01 | Stop reason: HOSPADM

## 2021-01-01 RX ORDER — ACETAMINOPHEN 325 MG/1
975 TABLET ORAL EVERY 8 HOURS
COMMUNITY

## 2021-01-01 RX ORDER — ESCITALOPRAM OXALATE 5 MG/1
5 TABLET ORAL DAILY
Status: DISCONTINUED | OUTPATIENT
Start: 2021-01-01 | End: 2021-01-01 | Stop reason: HOSPADM

## 2021-01-01 RX ORDER — ONDANSETRON 2 MG/ML
4 INJECTION INTRAMUSCULAR; INTRAVENOUS EVERY 6 HOURS PRN
Status: CANCELLED | OUTPATIENT
Start: 2021-01-01

## 2021-01-01 RX ORDER — ESCITALOPRAM OXALATE 5 MG/1
5 TABLET ORAL DAILY
Qty: 14 TABLET | Refills: 0 | Status: SHIPPED | OUTPATIENT
Start: 2021-01-01 | End: 2022-01-01 | Stop reason: SDUPTHER

## 2021-01-01 RX ADMIN — GLUCAGON HYDROCHLORIDE 0.5 MG: KIT at 13:00

## 2021-01-01 RX ADMIN — GABAPENTIN 100 MG: 100 CAPSULE ORAL at 17:17

## 2021-01-01 RX ADMIN — GABAPENTIN 100 MG: 100 CAPSULE ORAL at 16:14

## 2021-01-01 RX ADMIN — GABAPENTIN 100 MG: 100 CAPSULE ORAL at 23:21

## 2021-01-01 RX ADMIN — DULOXETINE HYDROCHLORIDE 60 MG: 60 CAPSULE, DELAYED RELEASE ORAL at 17:32

## 2021-01-01 RX ADMIN — IRON SUCROSE 200 MG: 20 INJECTION, SOLUTION INTRAVENOUS at 18:38

## 2021-01-01 RX ADMIN — ENOXAPARIN SODIUM 40 MG: 40 INJECTION SUBCUTANEOUS at 11:37

## 2021-01-01 RX ADMIN — PROPOFOL 20 MG: 10 INJECTION, EMULSION INTRAVENOUS at 13:29

## 2021-01-01 RX ADMIN — INSULIN LISPRO 1 UNITS: 100 INJECTION, SOLUTION INTRAVENOUS; SUBCUTANEOUS at 17:09

## 2021-01-01 RX ADMIN — CEFEPIME HYDROCHLORIDE 1000 MG: 2 INJECTION, POWDER, FOR SOLUTION INTRAVENOUS at 04:03

## 2021-01-01 RX ADMIN — HYDROMORPHONE HYDROCHLORIDE 0.5 MG: 2 INJECTION, SOLUTION INTRAMUSCULAR; INTRAVENOUS; SUBCUTANEOUS at 17:48

## 2021-01-01 RX ADMIN — GABAPENTIN 100 MG: 100 CAPSULE ORAL at 11:36

## 2021-01-01 RX ADMIN — SODIUM CHLORIDE, SODIUM LACTATE, POTASSIUM CHLORIDE, AND CALCIUM CHLORIDE: .6; .31; .03; .02 INJECTION, SOLUTION INTRAVENOUS at 13:29

## 2021-01-01 RX ADMIN — ACETAMINOPHEN 650 MG: 325 TABLET, FILM COATED ORAL at 17:08

## 2021-01-01 RX ADMIN — CHLORHEXIDINE GLUCONATE 0.12% ORAL RINSE 15 ML: 1.2 LIQUID ORAL at 10:52

## 2021-01-01 RX ADMIN — TORSEMIDE 20 MG: 20 TABLET ORAL at 08:50

## 2021-01-01 RX ADMIN — TIOTROPIUM BROMIDE 18 MCG: 18 CAPSULE ORAL; RESPIRATORY (INHALATION) at 10:45

## 2021-01-01 RX ADMIN — INSULIN LISPRO 1 UNITS: 100 INJECTION, SOLUTION INTRAVENOUS; SUBCUTANEOUS at 11:49

## 2021-01-01 RX ADMIN — DULOXETINE HYDROCHLORIDE 60 MG: 60 CAPSULE, DELAYED RELEASE ORAL at 08:09

## 2021-01-01 RX ADMIN — LIDOCAINE 5% 1 PATCH: 700 PATCH TOPICAL at 12:26

## 2021-01-01 RX ADMIN — CEFTRIAXONE SODIUM 1000 MG: 10 INJECTION, POWDER, FOR SOLUTION INTRAVENOUS at 09:20

## 2021-01-01 RX ADMIN — LIDOCAINE 5% 1 PATCH: 700 PATCH TOPICAL at 11:43

## 2021-01-01 RX ADMIN — GABAPENTIN 100 MG: 100 CAPSULE ORAL at 08:37

## 2021-01-01 RX ADMIN — POLYETHYLENE GLYCOL 3350 17 G: 17 POWDER, FOR SOLUTION ORAL at 10:20

## 2021-01-01 RX ADMIN — GABAPENTIN 100 MG: 100 CAPSULE ORAL at 10:23

## 2021-01-01 RX ADMIN — ESCITALOPRAM 5 MG: 5 TABLET, FILM COATED ORAL at 10:09

## 2021-01-01 RX ADMIN — HYDROMORPHONE HYDROCHLORIDE 0.5 MG: 1 INJECTION, SOLUTION INTRAMUSCULAR; INTRAVENOUS; SUBCUTANEOUS at 06:10

## 2021-01-01 RX ADMIN — ACETAMINOPHEN 650 MG: 325 TABLET, FILM COATED ORAL at 12:49

## 2021-01-01 RX ADMIN — ACETAMINOPHEN 650 MG: 325 TABLET, FILM COATED ORAL at 23:51

## 2021-01-01 RX ADMIN — PROPOFOL 20 MG: 10 INJECTION, EMULSION INTRAVENOUS at 12:55

## 2021-01-01 RX ADMIN — GLUCAGON HYDROCHLORIDE 0.5 MG: KIT at 13:39

## 2021-01-01 RX ADMIN — STANDARDIZED SENNA CONCENTRATE 8.6 MG: 8.6 TABLET ORAL at 08:11

## 2021-01-01 RX ADMIN — ACETAMINOPHEN 650 MG: 325 TABLET, FILM COATED ORAL at 18:32

## 2021-01-01 RX ADMIN — HYDROCORTISONE: 25 CREAM TOPICAL at 16:14

## 2021-01-01 RX ADMIN — SALMETEROL XINAFOATE 1 PUFF: 50 POWDER, METERED ORAL; RESPIRATORY (INHALATION) at 10:34

## 2021-01-01 RX ADMIN — ACETAMINOPHEN 650 MG: 325 TABLET, FILM COATED ORAL at 05:25

## 2021-01-01 RX ADMIN — INSULIN LISPRO 1 UNITS: 100 INJECTION, SOLUTION INTRAVENOUS; SUBCUTANEOUS at 13:50

## 2021-01-01 RX ADMIN — LISINOPRIL 2.5 MG: 5 TABLET ORAL at 08:52

## 2021-01-01 RX ADMIN — GABAPENTIN 100 MG: 100 CAPSULE ORAL at 22:14

## 2021-01-01 RX ADMIN — OXYCODONE HYDROCHLORIDE 5 MG: 5 TABLET ORAL at 06:25

## 2021-01-01 RX ADMIN — INSULIN LISPRO 2 UNITS: 100 INJECTION, SOLUTION INTRAVENOUS; SUBCUTANEOUS at 16:29

## 2021-01-01 RX ADMIN — HEPARIN SODIUM 5000 UNITS: 5000 INJECTION INTRAVENOUS; SUBCUTANEOUS at 08:12

## 2021-01-01 RX ADMIN — GLYCOPYRROLATE 0.4 MG: 0.2 INJECTION, SOLUTION INTRAMUSCULAR; INTRAVENOUS at 17:36

## 2021-01-01 RX ADMIN — INSULIN LISPRO 2 UNITS: 100 INJECTION, SOLUTION INTRAVENOUS; SUBCUTANEOUS at 11:47

## 2021-01-01 RX ADMIN — DULOXETINE HYDROCHLORIDE 60 MG: 60 CAPSULE, DELAYED RELEASE ORAL at 10:28

## 2021-01-01 RX ADMIN — GABAPENTIN 100 MG: 100 CAPSULE ORAL at 10:11

## 2021-01-01 RX ADMIN — SODIUM CHLORIDE 8 MG/HR: 9 INJECTION, SOLUTION INTRAVENOUS at 19:32

## 2021-01-01 RX ADMIN — ESCITALOPRAM 5 MG: 5 TABLET, FILM COATED ORAL at 08:23

## 2021-01-01 RX ADMIN — SODIUM CHLORIDE 1000 ML: 0.9 INJECTION, SOLUTION INTRAVENOUS at 08:23

## 2021-01-01 RX ADMIN — ESCITALOPRAM 5 MG: 5 TABLET, FILM COATED ORAL at 08:39

## 2021-01-01 RX ADMIN — HYDROMORPHONE HYDROCHLORIDE 0.5 MG: 1 INJECTION, SOLUTION INTRAMUSCULAR; INTRAVENOUS; SUBCUTANEOUS at 00:17

## 2021-01-01 RX ADMIN — DULOXETINE HYDROCHLORIDE 60 MG: 60 CAPSULE, DELAYED RELEASE ORAL at 08:22

## 2021-01-01 RX ADMIN — PROPOFOL 20 MG: 10 INJECTION, EMULSION INTRAVENOUS at 13:01

## 2021-01-01 RX ADMIN — HYDROMORPHONE HYDROCHLORIDE 0.5 MG: 1 INJECTION, SOLUTION INTRAMUSCULAR; INTRAVENOUS; SUBCUTANEOUS at 09:37

## 2021-01-01 RX ADMIN — TIOTROPIUM BROMIDE 18 MCG: 18 CAPSULE ORAL; RESPIRATORY (INHALATION) at 08:27

## 2021-01-01 RX ADMIN — METOPROLOL TARTRATE 12.5 MG: 25 TABLET, FILM COATED ORAL at 11:35

## 2021-01-01 RX ADMIN — PANTOPRAZOLE SODIUM 40 MG: 40 TABLET, DELAYED RELEASE ORAL at 16:29

## 2021-01-01 RX ADMIN — INSULIN LISPRO 1 UNITS: 100 INJECTION, SOLUTION INTRAVENOUS; SUBCUTANEOUS at 10:41

## 2021-01-01 RX ADMIN — DIPHENHYDRAMINE HYDROCHLORIDE 50 MG: 50 INJECTION, SOLUTION INTRAMUSCULAR; INTRAVENOUS at 15:25

## 2021-01-01 RX ADMIN — POLYETHYLENE GLYCOL 3350 17 G: 17 POWDER, FOR SOLUTION ORAL at 08:13

## 2021-01-01 RX ADMIN — ESCITALOPRAM 5 MG: 5 TABLET, FILM COATED ORAL at 08:51

## 2021-01-01 RX ADMIN — SODIUM CHLORIDE 8 MG/HR: 9 INJECTION, SOLUTION INTRAVENOUS at 09:26

## 2021-01-01 RX ADMIN — DOCUSATE SODIUM 100 MG: 100 CAPSULE, LIQUID FILLED ORAL at 11:36

## 2021-01-01 RX ADMIN — PROPOFOL 20 MG: 10 INJECTION, EMULSION INTRAVENOUS at 13:03

## 2021-01-01 RX ADMIN — POLYETHYLENE GLYCOL 3350 17 G: 17 POWDER, FOR SOLUTION ORAL at 10:09

## 2021-01-01 RX ADMIN — GABAPENTIN 100 MG: 100 CAPSULE ORAL at 08:11

## 2021-01-01 RX ADMIN — SALMETEROL XINAFOATE 1 PUFF: 50 POWDER, METERED ORAL; RESPIRATORY (INHALATION) at 10:45

## 2021-01-01 RX ADMIN — SALMETEROL XINAFOATE 1 PUFF: 50 POWDER, METERED ORAL; RESPIRATORY (INHALATION) at 17:00

## 2021-01-01 RX ADMIN — CEFAZOLIN SODIUM 1000 MG: 1 SOLUTION INTRAVENOUS at 16:27

## 2021-01-01 RX ADMIN — INSULIN LISPRO 2 UNITS: 100 INJECTION, SOLUTION INTRAVENOUS; SUBCUTANEOUS at 08:23

## 2021-01-01 RX ADMIN — GABAPENTIN 100 MG: 100 CAPSULE ORAL at 20:20

## 2021-01-01 RX ADMIN — INSULIN LISPRO 1 UNITS: 100 INJECTION, SOLUTION INTRAVENOUS; SUBCUTANEOUS at 12:28

## 2021-01-01 RX ADMIN — SODIUM CHLORIDE, SODIUM LACTATE, POTASSIUM CHLORIDE, CALCIUM CHLORIDE: 600; 310; 30; 20 INJECTION, SOLUTION INTRAVENOUS at 14:06

## 2021-01-01 RX ADMIN — SALMETEROL XINAFOATE 1 PUFF: 50 POWDER, METERED ORAL; RESPIRATORY (INHALATION) at 08:23

## 2021-01-01 RX ADMIN — GABAPENTIN 100 MG: 100 CAPSULE ORAL at 08:33

## 2021-01-01 RX ADMIN — PROPOFOL 30 MG: 10 INJECTION, EMULSION INTRAVENOUS at 13:15

## 2021-01-01 RX ADMIN — GABAPENTIN 100 MG: 100 CAPSULE ORAL at 17:32

## 2021-01-01 RX ADMIN — PROPOFOL 20 MG: 10 INJECTION, EMULSION INTRAVENOUS at 12:53

## 2021-01-01 RX ADMIN — PROPOFOL 50 MG: 10 INJECTION, EMULSION INTRAVENOUS at 14:13

## 2021-01-01 RX ADMIN — PANTOPRAZOLE SODIUM 40 MG: 40 TABLET, DELAYED RELEASE ORAL at 18:42

## 2021-01-01 RX ADMIN — DULOXETINE HYDROCHLORIDE 60 MG: 60 CAPSULE, DELAYED RELEASE ORAL at 17:00

## 2021-01-01 RX ADMIN — ACETAMINOPHEN 650 MG: 325 TABLET, FILM COATED ORAL at 12:27

## 2021-01-01 RX ADMIN — DULOXETINE HYDROCHLORIDE 60 MG: 60 CAPSULE, DELAYED RELEASE ORAL at 23:21

## 2021-01-01 RX ADMIN — LIDOCAINE 5% 1 PATCH: 700 PATCH TOPICAL at 08:22

## 2021-01-01 RX ADMIN — ACETAMINOPHEN 650 MG: 325 TABLET, FILM COATED ORAL at 00:50

## 2021-01-01 RX ADMIN — ACETAMINOPHEN 650 MG: 325 TABLET, FILM COATED ORAL at 05:35

## 2021-01-01 RX ADMIN — SALMETEROL XINAFOATE 1 PUFF: 50 POWDER, METERED ORAL; RESPIRATORY (INHALATION) at 11:49

## 2021-01-01 RX ADMIN — PRAVASTATIN SODIUM 80 MG: 80 TABLET ORAL at 17:14

## 2021-01-01 RX ADMIN — ACETAMINOPHEN 650 MG: 325 TABLET, FILM COATED ORAL at 06:11

## 2021-01-01 RX ADMIN — SALMETEROL XINAFOATE 1 PUFF: 50 POWDER, METERED ORAL; RESPIRATORY (INHALATION) at 18:02

## 2021-01-01 RX ADMIN — PROPOFOL 20 MG: 10 INJECTION, EMULSION INTRAVENOUS at 13:19

## 2021-01-01 RX ADMIN — ACETAMINOPHEN 650 MG: 325 TABLET, FILM COATED ORAL at 13:48

## 2021-01-01 RX ADMIN — ACETAMINOPHEN 650 MG: 325 TABLET, FILM COATED ORAL at 22:35

## 2021-01-01 RX ADMIN — HYDROMORPHONE HYDROCHLORIDE 0.5 MG: 1 INJECTION, SOLUTION INTRAMUSCULAR; INTRAVENOUS; SUBCUTANEOUS at 22:26

## 2021-01-01 RX ADMIN — INSULIN LISPRO 5 UNITS: 100 INJECTION, SOLUTION INTRAVENOUS; SUBCUTANEOUS at 07:20

## 2021-01-01 RX ADMIN — HEPARIN SODIUM 5000 UNITS: 5000 INJECTION INTRAVENOUS; SUBCUTANEOUS at 21:27

## 2021-01-01 RX ADMIN — ESCITALOPRAM 5 MG: 5 TABLET, FILM COATED ORAL at 08:10

## 2021-01-01 RX ADMIN — ROCURONIUM BROMIDE 10 MG: 10 INJECTION, SOLUTION INTRAVENOUS at 16:07

## 2021-01-01 RX ADMIN — METOPROLOL TARTRATE 12.5 MG: 25 TABLET, FILM COATED ORAL at 22:44

## 2021-01-01 RX ADMIN — INSULIN LISPRO 2 UNITS: 100 INJECTION, SOLUTION INTRAVENOUS; SUBCUTANEOUS at 17:32

## 2021-01-01 RX ADMIN — ESCITALOPRAM OXALATE 5 MG: 10 TABLET ORAL at 10:11

## 2021-01-01 RX ADMIN — INSULIN DETEMIR 15 UNITS: 100 INJECTION, SOLUTION SUBCUTANEOUS at 08:23

## 2021-01-01 RX ADMIN — PRAVASTATIN SODIUM 40 MG: 40 TABLET ORAL at 16:06

## 2021-01-01 RX ADMIN — POTASSIUM CHLORIDE 20 MEQ: 1500 TABLET, EXTENDED RELEASE ORAL at 12:49

## 2021-01-01 RX ADMIN — METOPROLOL TARTRATE 12.5 MG: 25 TABLET, FILM COATED ORAL at 08:52

## 2021-01-01 RX ADMIN — DOCUSATE SODIUM 100 MG: 100 CAPSULE, LIQUID FILLED ORAL at 08:15

## 2021-01-01 RX ADMIN — ESCITALOPRAM 5 MG: 5 TABLET, FILM COATED ORAL at 08:19

## 2021-01-01 RX ADMIN — SODIUM CHLORIDE 8 MG/HR: 9 INJECTION, SOLUTION INTRAVENOUS at 05:26

## 2021-01-01 RX ADMIN — INSULIN DETEMIR 15 UNITS: 100 INJECTION, SOLUTION SUBCUTANEOUS at 10:41

## 2021-01-01 RX ADMIN — TIOTROPIUM BROMIDE 18 MCG: 18 CAPSULE ORAL; RESPIRATORY (INHALATION) at 08:15

## 2021-01-01 RX ADMIN — GABAPENTIN 100 MG: 100 CAPSULE ORAL at 08:15

## 2021-01-01 RX ADMIN — ENOXAPARIN SODIUM 40 MG: 40 INJECTION SUBCUTANEOUS at 08:19

## 2021-01-01 RX ADMIN — INSULIN LISPRO 3 UNITS: 100 INJECTION, SOLUTION INTRAVENOUS; SUBCUTANEOUS at 18:03

## 2021-01-01 RX ADMIN — DULOXETINE HYDROCHLORIDE 60 MG: 60 CAPSULE, DELAYED RELEASE ORAL at 18:32

## 2021-01-01 RX ADMIN — ROCURONIUM BROMIDE 40 MG: 10 INJECTION, SOLUTION INTRAVENOUS at 15:26

## 2021-01-01 RX ADMIN — SODIUM CHLORIDE, SODIUM LACTATE, POTASSIUM CHLORIDE, AND CALCIUM CHLORIDE: .6; .31; .03; .02 INJECTION, SOLUTION INTRAVENOUS at 15:12

## 2021-01-01 RX ADMIN — Medication 1 PATCH: at 22:30

## 2021-01-01 RX ADMIN — FENTANYL CITRATE 50 MCG: 50 INJECTION, SOLUTION INTRAMUSCULAR; INTRAVENOUS at 16:39

## 2021-01-01 RX ADMIN — PRAVASTATIN SODIUM 80 MG: 80 TABLET ORAL at 16:59

## 2021-01-01 RX ADMIN — PROPOFOL 50 MG: 10 INJECTION, EMULSION INTRAVENOUS at 15:26

## 2021-01-01 RX ADMIN — STANDARDIZED SENNA CONCENTRATE 8.6 MG: 8.6 TABLET ORAL at 10:23

## 2021-01-01 RX ADMIN — POLYETHYLENE GLYCOL 3350 17 G: 17 POWDER, FOR SOLUTION ORAL at 17:21

## 2021-01-01 RX ADMIN — OXYCODONE HYDROCHLORIDE 5 MG: 5 TABLET ORAL at 23:54

## 2021-01-01 RX ADMIN — DULOXETINE HYDROCHLORIDE 60 MG: 60 CAPSULE, DELAYED RELEASE ORAL at 08:51

## 2021-01-01 RX ADMIN — HEPARIN SODIUM 5000 UNITS: 5000 INJECTION INTRAVENOUS; SUBCUTANEOUS at 21:34

## 2021-01-01 RX ADMIN — PROPOFOL 30 MG: 10 INJECTION, EMULSION INTRAVENOUS at 12:51

## 2021-01-01 RX ADMIN — ACETAMINOPHEN 650 MG: 325 TABLET, FILM COATED ORAL at 18:10

## 2021-01-01 RX ADMIN — ACETAMINOPHEN 650 MG: 325 TABLET, FILM COATED ORAL at 20:49

## 2021-01-01 RX ADMIN — DULOXETINE HYDROCHLORIDE 60 MG: 60 CAPSULE, DELAYED RELEASE ORAL at 18:42

## 2021-01-01 RX ADMIN — INSULIN GLARGINE 10 UNITS: 100 INJECTION, SOLUTION SUBCUTANEOUS at 21:43

## 2021-01-01 RX ADMIN — GABAPENTIN 100 MG: 100 CAPSULE ORAL at 17:13

## 2021-01-01 RX ADMIN — ACETAMINOPHEN 650 MG: 325 TABLET, FILM COATED ORAL at 23:54

## 2021-01-01 RX ADMIN — PROPOFOL 20 MG: 10 INJECTION, EMULSION INTRAVENOUS at 13:13

## 2021-01-01 RX ADMIN — GABAPENTIN 100 MG: 100 CAPSULE ORAL at 18:42

## 2021-01-01 RX ADMIN — DOCUSATE SODIUM 100 MG: 100 CAPSULE, LIQUID FILLED ORAL at 10:08

## 2021-01-01 RX ADMIN — OXYBUTYNIN CHLORIDE 5 MG: 5 TABLET, EXTENDED RELEASE ORAL at 11:35

## 2021-01-01 RX ADMIN — ACETAMINOPHEN 650 MG: 325 TABLET, FILM COATED ORAL at 23:38

## 2021-01-01 RX ADMIN — Medication 1 PATCH: at 23:39

## 2021-01-01 RX ADMIN — METOPROLOL TARTRATE 12.5 MG: 25 TABLET, FILM COATED ORAL at 08:16

## 2021-01-01 RX ADMIN — INSULIN LISPRO 2 UNITS: 100 INJECTION, SOLUTION INTRAVENOUS; SUBCUTANEOUS at 14:39

## 2021-01-01 RX ADMIN — PRAVASTATIN SODIUM 80 MG: 80 TABLET ORAL at 16:14

## 2021-01-01 RX ADMIN — FENTANYL CITRATE 50 MCG: 50 INJECTION, SOLUTION INTRAMUSCULAR; INTRAVENOUS at 15:15

## 2021-01-01 RX ADMIN — DULOXETINE HYDROCHLORIDE 60 MG: 60 CAPSULE, DELAYED RELEASE ORAL at 17:39

## 2021-01-01 RX ADMIN — Medication 12.5 MG: at 10:11

## 2021-01-01 RX ADMIN — ACETAMINOPHEN 650 MG: 325 TABLET, FILM COATED ORAL at 17:00

## 2021-01-01 RX ADMIN — METOPROLOL TARTRATE 12.5 MG: 25 TABLET, FILM COATED ORAL at 08:32

## 2021-01-01 RX ADMIN — INSULIN LISPRO 1 UNITS: 100 INJECTION, SOLUTION INTRAVENOUS; SUBCUTANEOUS at 17:01

## 2021-01-01 RX ADMIN — LIDOCAINE 5% 1 PATCH: 700 PATCH TOPICAL at 09:02

## 2021-01-01 RX ADMIN — GABAPENTIN 100 MG: 100 CAPSULE ORAL at 22:44

## 2021-01-01 RX ADMIN — SALMETEROL XINAFOATE 1 PUFF: 50 POWDER, METERED ORAL; RESPIRATORY (INHALATION) at 10:31

## 2021-01-01 RX ADMIN — PROPOFOL 50 MG: 10 INJECTION, EMULSION INTRAVENOUS at 14:27

## 2021-01-01 RX ADMIN — INSULIN DETEMIR 15 UNITS: 100 INJECTION, SOLUTION SUBCUTANEOUS at 08:39

## 2021-01-01 RX ADMIN — SODIUM CHLORIDE 75 ML/HR: 0.9 INJECTION, SOLUTION INTRAVENOUS at 04:35

## 2021-01-01 RX ADMIN — DOCUSATE SODIUM 100 MG: 100 CAPSULE, LIQUID FILLED ORAL at 17:13

## 2021-01-01 RX ADMIN — SODIUM CHLORIDE, SODIUM LACTATE, POTASSIUM CHLORIDE, AND CALCIUM CHLORIDE: .6; .31; .03; .02 INJECTION, SOLUTION INTRAVENOUS at 11:52

## 2021-01-01 RX ADMIN — PROPOFOL 20 MG: 10 INJECTION, EMULSION INTRAVENOUS at 13:10

## 2021-01-01 RX ADMIN — IOHEXOL 100 ML: 350 INJECTION, SOLUTION INTRAVENOUS at 10:28

## 2021-01-01 RX ADMIN — FENTANYL CITRATE 50 MCG: 50 INJECTION, SOLUTION INTRAMUSCULAR; INTRAVENOUS at 16:07

## 2021-01-01 RX ADMIN — SALMETEROL XINAFOATE 1 PUFF: 50 POWDER, METERED ORAL; RESPIRATORY (INHALATION) at 08:15

## 2021-01-01 RX ADMIN — SODIUM CHLORIDE 75 ML/HR: 0.9 INJECTION, SOLUTION INTRAVENOUS at 16:19

## 2021-01-01 RX ADMIN — INSULIN LISPRO 1 UNITS: 100 INJECTION, SOLUTION INTRAVENOUS; SUBCUTANEOUS at 12:49

## 2021-01-01 RX ADMIN — PANTOPRAZOLE SODIUM 40 MG: 40 TABLET, DELAYED RELEASE ORAL at 05:36

## 2021-01-01 RX ADMIN — LIDOCAINE HYDROCHLORIDE 40 MG: 20 INJECTION, SOLUTION EPIDURAL; INFILTRATION; INTRACAUDAL; PERINEURAL at 12:50

## 2021-01-01 RX ADMIN — CEFTRIAXONE SODIUM 1000 MG: 10 INJECTION, POWDER, FOR SOLUTION INTRAVENOUS at 10:48

## 2021-01-01 RX ADMIN — PRAVASTATIN SODIUM 80 MG: 80 TABLET ORAL at 17:52

## 2021-01-01 RX ADMIN — GABAPENTIN 100 MG: 100 CAPSULE ORAL at 08:23

## 2021-01-01 RX ADMIN — HEPARIN SODIUM 5000 UNITS: 5000 INJECTION INTRAVENOUS; SUBCUTANEOUS at 05:25

## 2021-01-01 RX ADMIN — ACETAMINOPHEN 650 MG: 325 TABLET, FILM COATED ORAL at 00:11

## 2021-01-01 RX ADMIN — MAGNESIUM CITRATE 148 ML: 1.75 LIQUID ORAL at 18:00

## 2021-01-01 RX ADMIN — GLUCAGON HYDROCHLORIDE 0.5 MG: KIT at 12:56

## 2021-01-01 RX ADMIN — PANTOPRAZOLE SODIUM 40 MG: 40 TABLET, DELAYED RELEASE ORAL at 06:18

## 2021-01-01 RX ADMIN — PROPOFOL 30 MG: 10 INJECTION, EMULSION INTRAVENOUS at 13:17

## 2021-01-01 RX ADMIN — DULOXETINE HYDROCHLORIDE 60 MG: 60 CAPSULE, DELAYED RELEASE ORAL at 10:11

## 2021-01-01 RX ADMIN — GABAPENTIN 100 MG: 100 CAPSULE ORAL at 16:15

## 2021-01-01 RX ADMIN — HYDROMORPHONE HYDROCHLORIDE 0.5 MG: 1 INJECTION, SOLUTION INTRAMUSCULAR; INTRAVENOUS; SUBCUTANEOUS at 04:10

## 2021-01-01 RX ADMIN — LABETALOL 20 MG/4 ML (5 MG/ML) INTRAVENOUS SYRINGE 5 MG: at 13:32

## 2021-01-01 RX ADMIN — DULOXETINE HYDROCHLORIDE 60 MG: 60 CAPSULE, DELAYED RELEASE ORAL at 17:53

## 2021-01-01 RX ADMIN — ACETAMINOPHEN 650 MG: 325 TABLET, FILM COATED ORAL at 23:44

## 2021-01-01 RX ADMIN — SODIUM CHLORIDE 75 ML/HR: 0.9 INJECTION, SOLUTION INTRAVENOUS at 14:27

## 2021-01-01 RX ADMIN — DOCUSATE SODIUM 100 MG: 100 CAPSULE, LIQUID FILLED ORAL at 08:19

## 2021-01-01 RX ADMIN — STANDARDIZED SENNA CONCENTRATE 8.6 MG: 8.6 TABLET ORAL at 10:08

## 2021-01-01 RX ADMIN — LIDOCAINE 5% 1 PATCH: 700 PATCH TOPICAL at 08:36

## 2021-01-01 RX ADMIN — ACETAMINOPHEN 650 MG: 325 TABLET, FILM COATED ORAL at 06:41

## 2021-01-01 RX ADMIN — STANDARDIZED SENNA CONCENTRATE 8.6 MG: 8.6 TABLET ORAL at 17:13

## 2021-01-01 RX ADMIN — GABAPENTIN 100 MG: 100 CAPSULE ORAL at 16:29

## 2021-01-01 RX ADMIN — PANTOPRAZOLE SODIUM 40 MG: 40 TABLET, DELAYED RELEASE ORAL at 16:14

## 2021-01-01 RX ADMIN — PHENYLEPHRINE HYDROCHLORIDE 50 MCG/MIN: 10 INJECTION INTRAVENOUS at 15:39

## 2021-01-01 RX ADMIN — ACETAMINOPHEN 650 MG: 325 TABLET, FILM COATED ORAL at 11:35

## 2021-01-01 RX ADMIN — SODIUM CHLORIDE 8 MG/HR: 9 INJECTION, SOLUTION INTRAVENOUS at 23:24

## 2021-01-01 RX ADMIN — ACETAMINOPHEN 650 MG: 325 TABLET, FILM COATED ORAL at 11:59

## 2021-01-01 RX ADMIN — DULOXETINE HYDROCHLORIDE 60 MG: 60 CAPSULE, DELAYED RELEASE ORAL at 11:35

## 2021-01-01 RX ADMIN — LIDOCAINE 5% 1 PATCH: 700 PATCH TOPICAL at 08:35

## 2021-01-01 RX ADMIN — OXYBUTYNIN CHLORIDE 5 MG: 5 TABLET, EXTENDED RELEASE ORAL at 08:18

## 2021-01-01 RX ADMIN — HEPARIN SODIUM 5000 UNITS: 5000 INJECTION INTRAVENOUS; SUBCUTANEOUS at 10:09

## 2021-01-01 RX ADMIN — CEFAZOLIN SODIUM 1500 MG: 1 INJECTION, POWDER, FOR SOLUTION INTRAMUSCULAR; INTRAVENOUS at 01:30

## 2021-01-01 RX ADMIN — HYDROMORPHONE HYDROCHLORIDE 0.5 MG: 1 INJECTION, SOLUTION INTRAMUSCULAR; INTRAVENOUS; SUBCUTANEOUS at 12:59

## 2021-01-01 RX ADMIN — CEFAZOLIN SODIUM 1000 MG: 1 SOLUTION INTRAVENOUS at 15:28

## 2021-01-01 RX ADMIN — INSULIN DETEMIR 15 UNITS: 100 INJECTION, SOLUTION SUBCUTANEOUS at 08:30

## 2021-01-01 RX ADMIN — EPHEDRINE SULFATE 5 MG: 50 INJECTION, SOLUTION INTRAVENOUS at 12:51

## 2021-01-01 RX ADMIN — INSULIN DETEMIR 15 UNITS: 100 INJECTION, SOLUTION SUBCUTANEOUS at 11:47

## 2021-01-01 RX ADMIN — PANTOPRAZOLE SODIUM 40 MG: 40 TABLET, DELAYED RELEASE ORAL at 07:20

## 2021-01-01 RX ADMIN — TIOTROPIUM BROMIDE 18 MCG: 18 CAPSULE ORAL; RESPIRATORY (INHALATION) at 08:54

## 2021-01-01 RX ADMIN — DULOXETINE HYDROCHLORIDE 60 MG: 60 CAPSULE, DELAYED RELEASE ORAL at 17:13

## 2021-01-01 RX ADMIN — DOCUSATE SODIUM 100 MG: 100 CAPSULE, LIQUID FILLED ORAL at 17:24

## 2021-01-01 RX ADMIN — MORPHINE SULFATE 6 MG: 10 INJECTION INTRAVENOUS at 17:11

## 2021-01-01 RX ADMIN — GABAPENTIN 100 MG: 100 CAPSULE ORAL at 10:27

## 2021-01-01 RX ADMIN — GABAPENTIN 100 MG: 100 CAPSULE ORAL at 21:06

## 2021-01-01 RX ADMIN — GABAPENTIN 100 MG: 100 CAPSULE ORAL at 16:06

## 2021-01-01 RX ADMIN — TIOTROPIUM BROMIDE 18 MCG: 18 CAPSULE ORAL; RESPIRATORY (INHALATION) at 08:35

## 2021-01-01 RX ADMIN — HEPARIN SODIUM 5000 UNITS: 5000 INJECTION INTRAVENOUS; SUBCUTANEOUS at 23:38

## 2021-01-01 RX ADMIN — STANDARDIZED SENNA CONCENTRATE 8.6 MG: 8.6 TABLET ORAL at 10:27

## 2021-01-01 RX ADMIN — BISACODYL 10 MG: 5 TABLET, COATED ORAL at 17:17

## 2021-01-01 RX ADMIN — LIDOCAINE 5% 1 PATCH: 700 PATCH TOPICAL at 08:14

## 2021-01-01 RX ADMIN — HYDROCORTISONE: 25 CREAM TOPICAL at 10:14

## 2021-01-01 RX ADMIN — HEPARIN SODIUM 5000 UNITS: 5000 INJECTION INTRAVENOUS; SUBCUTANEOUS at 14:31

## 2021-01-01 RX ADMIN — METOPROLOL TARTRATE 12.5 MG: 25 TABLET, FILM COATED ORAL at 23:21

## 2021-01-01 RX ADMIN — STANDARDIZED SENNA CONCENTRATE 8.6 MG: 8.6 TABLET ORAL at 08:32

## 2021-01-01 RX ADMIN — METOPROLOL TARTRATE 12.5 MG: 25 TABLET, FILM COATED ORAL at 10:23

## 2021-01-01 RX ADMIN — ESCITALOPRAM 5 MG: 5 TABLET, FILM COATED ORAL at 10:23

## 2021-01-01 RX ADMIN — ACETAMINOPHEN 650 MG: 325 TABLET, FILM COATED ORAL at 11:51

## 2021-01-01 RX ADMIN — HYDROMORPHONE HYDROCHLORIDE 0.5 MG: 1 INJECTION, SOLUTION INTRAMUSCULAR; INTRAVENOUS; SUBCUTANEOUS at 08:07

## 2021-01-01 RX ADMIN — METOPROLOL TARTRATE 12.5 MG: 25 TABLET, FILM COATED ORAL at 22:15

## 2021-01-01 RX ADMIN — PRAVASTATIN SODIUM 80 MG: 80 TABLET ORAL at 17:59

## 2021-01-01 RX ADMIN — INSULIN LISPRO 3 UNITS: 100 INJECTION, SOLUTION INTRAVENOUS; SUBCUTANEOUS at 08:26

## 2021-01-01 RX ADMIN — ALBUMIN (HUMAN): 12.5 INJECTION, SOLUTION INTRAVENOUS at 17:06

## 2021-01-01 RX ADMIN — FENTANYL CITRATE 50 MCG: 50 INJECTION, SOLUTION INTRAMUSCULAR; INTRAVENOUS at 16:45

## 2021-01-01 RX ADMIN — DULOXETINE HYDROCHLORIDE 60 MG: 60 CAPSULE, DELAYED RELEASE ORAL at 17:24

## 2021-01-01 RX ADMIN — SALMETEROL XINAFOATE 1 PUFF: 50 POWDER, METERED ORAL; RESPIRATORY (INHALATION) at 18:37

## 2021-01-01 RX ADMIN — ESCITALOPRAM 5 MG: 5 TABLET, FILM COATED ORAL at 08:12

## 2021-01-01 RX ADMIN — METOPROLOL TARTRATE 12.5 MG: 25 TABLET, FILM COATED ORAL at 08:11

## 2021-01-01 RX ADMIN — HEPARIN SODIUM 5000 UNITS: 5000 INJECTION INTRAVENOUS; SUBCUTANEOUS at 17:19

## 2021-01-01 RX ADMIN — INFLUENZA A VIRUS A/VICTORIA/2570/2019 IVR-215 (H1N1) ANTIGEN (FORMALDEHYDE INACTIVATED), INFLUENZA A VIRUS A/TASMANIA/503/2020 IVR-221 (H3N2) ANTIGEN (FORMALDEHYDE INACTIVATED), INFLUENZA B VIRUS B/PHUKET/3073/2013 ANTIGEN (FORMALDEHYDE INACTIVATED), AND INFLUENZA B VIRUS B/WASHINGTON/02/2019 ANTIGEN (FORMALDEHYDE INACTIVATED) 0.7 ML: 60; 60; 60; 60 INJECTION, SUSPENSION INTRAMUSCULAR at 14:00

## 2021-01-01 RX ADMIN — GABAPENTIN 100 MG: 100 CAPSULE ORAL at 21:37

## 2021-01-01 RX ADMIN — SALMETEROL XINAFOATE 1 PUFF: 50 POWDER, METERED ORAL; RESPIRATORY (INHALATION) at 08:27

## 2021-01-01 RX ADMIN — CEFTRIAXONE SODIUM 1000 MG: 10 INJECTION, POWDER, FOR SOLUTION INTRAVENOUS at 12:28

## 2021-01-01 RX ADMIN — GLUCAGON HYDROCHLORIDE 0.5 MG: KIT at 13:33

## 2021-01-01 RX ADMIN — PROPOFOL 20 MG: 10 INJECTION, EMULSION INTRAVENOUS at 13:07

## 2021-01-01 RX ADMIN — EPHEDRINE SULFATE 5 MG: 50 INJECTION, SOLUTION INTRAVENOUS at 12:50

## 2021-01-01 RX ADMIN — PRAVASTATIN SODIUM 80 MG: 80 TABLET ORAL at 17:08

## 2021-01-01 RX ADMIN — EPHEDRINE SULFATE 10 MG: 50 INJECTION, SOLUTION INTRAVENOUS at 17:00

## 2021-01-01 RX ADMIN — SODIUM CHLORIDE 8 MG/HR: 9 INJECTION, SOLUTION INTRAVENOUS at 08:24

## 2021-01-01 RX ADMIN — LISINOPRIL 2.5 MG: 2.5 TABLET ORAL at 10:11

## 2021-01-01 RX ADMIN — TORSEMIDE 20 MG: 20 TABLET ORAL at 10:12

## 2021-01-01 RX ADMIN — LABETALOL 20 MG/4 ML (5 MG/ML) INTRAVENOUS SYRINGE 5 MG: at 13:14

## 2021-01-01 RX ADMIN — METOPROLOL TARTRATE 12.5 MG: 25 TABLET, FILM COATED ORAL at 10:28

## 2021-01-01 RX ADMIN — GLUCAGON HYDROCHLORIDE 1 MG: KIT at 13:08

## 2021-01-01 RX ADMIN — PROPOFOL 40 MG: 10 INJECTION, EMULSION INTRAVENOUS at 12:50

## 2021-01-01 RX ADMIN — ACETAMINOPHEN 650 MG: 325 TABLET, FILM COATED ORAL at 17:13

## 2021-01-01 RX ADMIN — SALMETEROL XINAFOATE 1 PUFF: 50 POWDER, METERED ORAL; RESPIRATORY (INHALATION) at 08:35

## 2021-01-01 RX ADMIN — DULOXETINE HYDROCHLORIDE 60 MG: 60 CAPSULE, DELAYED RELEASE ORAL at 10:09

## 2021-01-01 RX ADMIN — GABAPENTIN 100 MG: 100 CAPSULE ORAL at 08:50

## 2021-01-01 RX ADMIN — CEFEPIME HYDROCHLORIDE 1000 MG: 2 INJECTION, POWDER, FOR SOLUTION INTRAVENOUS at 03:41

## 2021-01-01 RX ADMIN — INSULIN LISPRO 1 UNITS: 100 INJECTION, SOLUTION INTRAVENOUS; SUBCUTANEOUS at 07:19

## 2021-01-01 RX ADMIN — GLUCAGON HYDROCHLORIDE 0.5 MG: KIT at 13:23

## 2021-01-01 RX ADMIN — HYDRALAZINE HYDROCHLORIDE 5 MG: 20 INJECTION INTRAMUSCULAR; INTRAVENOUS at 22:46

## 2021-01-01 RX ADMIN — ACETAMINOPHEN 650 MG: 325 TABLET, FILM COATED ORAL at 17:52

## 2021-01-01 RX ADMIN — METOPROLOL TARTRATE 12.5 MG: 25 TABLET, FILM COATED ORAL at 08:22

## 2021-01-01 RX ADMIN — GABAPENTIN 100 MG: 100 CAPSULE ORAL at 21:30

## 2021-01-01 RX ADMIN — LIDOCAINE HYDROCHLORIDE 50 MG: 10 INJECTION, SOLUTION EPIDURAL; INFILTRATION; INTRACAUDAL; PERINEURAL at 15:26

## 2021-01-01 RX ADMIN — PANTOPRAZOLE SODIUM 40 MG: 40 TABLET, DELAYED RELEASE ORAL at 06:11

## 2021-01-01 RX ADMIN — DOCUSATE SODIUM 100 MG: 100 CAPSULE, LIQUID FILLED ORAL at 10:23

## 2021-01-01 RX ADMIN — SODIUM CHLORIDE, SODIUM LACTATE, POTASSIUM CHLORIDE, AND CALCIUM CHLORIDE 500 ML: .6; .31; .03; .02 INJECTION, SOLUTION INTRAVENOUS at 07:25

## 2021-01-01 RX ADMIN — LIDOCAINE 5% 1 PATCH: 700 PATCH TOPICAL at 10:06

## 2021-01-01 RX ADMIN — HYDROMORPHONE HYDROCHLORIDE 0.5 MG: 1 INJECTION, SOLUTION INTRAMUSCULAR; INTRAVENOUS; SUBCUTANEOUS at 12:48

## 2021-01-01 RX ADMIN — PANTOPRAZOLE SODIUM 40 MG: 40 INJECTION, POWDER, FOR SOLUTION INTRAVENOUS at 08:20

## 2021-01-01 RX ADMIN — HYDROCORTISONE: 25 CREAM TOPICAL at 21:44

## 2021-01-01 RX ADMIN — SALMETEROL XINAFOATE 1 PUFF: 50 POWDER, METERED ORAL; RESPIRATORY (INHALATION) at 17:09

## 2021-01-01 RX ADMIN — DULOXETINE HYDROCHLORIDE 60 MG: 60 CAPSULE, DELAYED RELEASE ORAL at 17:59

## 2021-01-01 RX ADMIN — HEPARIN SODIUM 5000 UNITS: 5000 INJECTION INTRAVENOUS; SUBCUTANEOUS at 18:38

## 2021-01-01 RX ADMIN — DULOXETINE HYDROCHLORIDE 60 MG: 60 CAPSULE, DELAYED RELEASE ORAL at 08:33

## 2021-01-01 RX ADMIN — Medication 1 PATCH: at 21:04

## 2021-01-01 RX ADMIN — OXYCODONE HYDROCHLORIDE 5 MG: 5 TABLET ORAL at 22:28

## 2021-01-01 RX ADMIN — PANTOPRAZOLE SODIUM 40 MG: 40 TABLET, DELAYED RELEASE ORAL at 05:25

## 2021-01-01 RX ADMIN — PRAVASTATIN SODIUM 80 MG: 80 TABLET ORAL at 17:13

## 2021-01-01 RX ADMIN — ROCURONIUM BROMIDE 10 MG: 10 INJECTION, SOLUTION INTRAVENOUS at 16:30

## 2021-01-01 RX ADMIN — OXYCODONE HYDROCHLORIDE 5 MG: 5 TABLET ORAL at 05:20

## 2021-01-01 RX ADMIN — GABAPENTIN 100 MG: 100 CAPSULE ORAL at 21:27

## 2021-01-01 RX ADMIN — INSULIN LISPRO 2 UNITS: 100 INJECTION, SOLUTION INTRAVENOUS; SUBCUTANEOUS at 17:59

## 2021-01-01 RX ADMIN — CEFAZOLIN SODIUM 1500 MG: 1 INJECTION, POWDER, FOR SOLUTION INTRAMUSCULAR; INTRAVENOUS at 09:04

## 2021-01-01 RX ADMIN — ESCITALOPRAM 5 MG: 5 TABLET, FILM COATED ORAL at 08:32

## 2021-01-01 RX ADMIN — DULOXETINE HYDROCHLORIDE 60 MG: 60 CAPSULE, DELAYED RELEASE ORAL at 08:39

## 2021-01-01 RX ADMIN — SODIUM CHLORIDE 50 ML/HR: 0.9 INJECTION, SOLUTION INTRAVENOUS at 17:05

## 2021-01-01 RX ADMIN — ESCITALOPRAM 5 MG: 5 TABLET, FILM COATED ORAL at 10:28

## 2021-01-01 RX ADMIN — INSULIN DETEMIR 15 UNITS: 100 INJECTION, SOLUTION SUBCUTANEOUS at 10:11

## 2021-01-01 RX ADMIN — INSULIN DETEMIR 15 UNITS: 100 INJECTION, SOLUTION SUBCUTANEOUS at 08:14

## 2021-01-01 RX ADMIN — CEFAZOLIN SODIUM 2000 MG: 2 SOLUTION INTRAVENOUS at 10:44

## 2021-01-01 RX ADMIN — SALMETEROL XINAFOATE 1 PUFF: 50 POWDER, METERED ORAL; RESPIRATORY (INHALATION) at 17:24

## 2021-01-01 RX ADMIN — HEPARIN SODIUM 5000 UNITS: 5000 INJECTION INTRAVENOUS; SUBCUTANEOUS at 08:23

## 2021-01-01 RX ADMIN — DOCUSATE SODIUM 100 MG: 100 CAPSULE, LIQUID FILLED ORAL at 17:52

## 2021-01-01 RX ADMIN — PANTOPRAZOLE SODIUM 40 MG: 40 TABLET, DELAYED RELEASE ORAL at 05:47

## 2021-01-01 RX ADMIN — METOPROLOL TARTRATE 12.5 MG: 25 TABLET, FILM COATED ORAL at 20:22

## 2021-01-01 RX ADMIN — METOPROLOL TARTRATE 12.5 MG: 25 TABLET, FILM COATED ORAL at 21:30

## 2021-01-01 RX ADMIN — CEFEPIME HYDROCHLORIDE 1000 MG: 2 INJECTION, POWDER, FOR SOLUTION INTRAVENOUS at 17:04

## 2021-01-01 RX ADMIN — GABAPENTIN 100 MG: 100 CAPSULE ORAL at 22:08

## 2021-01-01 RX ADMIN — GABAPENTIN 100 MG: 100 CAPSULE ORAL at 08:39

## 2021-01-01 RX ADMIN — LISINOPRIL 2.5 MG: 5 TABLET ORAL at 08:18

## 2021-01-01 RX ADMIN — METOPROLOL TARTRATE 12.5 MG: 25 TABLET, FILM COATED ORAL at 23:39

## 2021-01-01 RX ADMIN — HYDROMORPHONE HYDROCHLORIDE 0.5 MG: 1 INJECTION, SOLUTION INTRAMUSCULAR; INTRAVENOUS; SUBCUTANEOUS at 02:32

## 2021-01-01 RX ADMIN — PRAVASTATIN SODIUM 80 MG: 80 TABLET ORAL at 17:18

## 2021-01-01 RX ADMIN — POLYETHYLENE GLYCOL 3350 17 G: 17 POWDER, FOR SOLUTION ORAL at 08:33

## 2021-01-01 RX ADMIN — PROPOFOL 20 MG: 10 INJECTION, EMULSION INTRAVENOUS at 13:33

## 2021-01-01 RX ADMIN — HYDROMORPHONE HYDROCHLORIDE 0.6 MG: 1 INJECTION, SOLUTION INTRAMUSCULAR; INTRAVENOUS; SUBCUTANEOUS at 18:17

## 2021-01-01 RX ADMIN — SODIUM CHLORIDE 75 ML/HR: 0.9 INJECTION, SOLUTION INTRAVENOUS at 02:48

## 2021-01-01 RX ADMIN — ENOXAPARIN SODIUM 40 MG: 40 INJECTION SUBCUTANEOUS at 10:32

## 2021-01-01 RX ADMIN — METOPROLOL TARTRATE 12.5 MG: 25 TABLET, FILM COATED ORAL at 10:08

## 2021-01-01 RX ADMIN — PRAVASTATIN SODIUM 40 MG: 40 TABLET ORAL at 17:00

## 2021-01-01 RX ADMIN — MORPHINE SULFATE 6 MG: 10 INJECTION INTRAVENOUS at 15:24

## 2021-01-01 RX ADMIN — GABAPENTIN 100 MG: 100 CAPSULE ORAL at 23:43

## 2021-01-01 RX ADMIN — ONDANSETRON 4 MG: 2 INJECTION INTRAMUSCULAR; INTRAVENOUS at 15:22

## 2021-01-01 RX ADMIN — INSULIN LISPRO 1 UNITS: 100 INJECTION, SOLUTION INTRAVENOUS; SUBCUTANEOUS at 16:57

## 2021-01-01 RX ADMIN — BISACODYL 10 MG: 10 SUPPOSITORY RECTAL at 16:36

## 2021-01-01 RX ADMIN — Medication 1 PATCH: at 22:44

## 2021-01-01 RX ADMIN — PANTOPRAZOLE SODIUM 40 MG: 40 TABLET, DELAYED RELEASE ORAL at 06:41

## 2021-01-01 RX ADMIN — DULOXETINE HYDROCHLORIDE 60 MG: 60 CAPSULE, DELAYED RELEASE ORAL at 17:14

## 2021-01-01 RX ADMIN — TIOTROPIUM BROMIDE 18 MCG: 18 CAPSULE ORAL; RESPIRATORY (INHALATION) at 16:13

## 2021-01-01 RX ADMIN — GABAPENTIN 100 MG: 100 CAPSULE ORAL at 22:29

## 2021-01-01 RX ADMIN — PROPOFOL 20 MG: 10 INJECTION, EMULSION INTRAVENOUS at 13:39

## 2021-01-01 RX ADMIN — GABAPENTIN 100 MG: 100 CAPSULE ORAL at 20:41

## 2021-01-01 RX ADMIN — PROPOFOL 50 MG: 10 INJECTION, EMULSION INTRAVENOUS at 14:20

## 2021-01-01 RX ADMIN — DEXTROSE MONOHYDRATE 25 ML: 25 INJECTION, SOLUTION INTRAVENOUS at 00:16

## 2021-01-01 RX ADMIN — METOPROLOL TARTRATE 12.5 MG: 25 TABLET, FILM COATED ORAL at 20:50

## 2021-01-01 RX ADMIN — DULOXETINE HYDROCHLORIDE 60 MG: 60 CAPSULE, DELAYED RELEASE ORAL at 08:17

## 2021-01-01 RX ADMIN — IOHEXOL 100 ML: 350 INJECTION, SOLUTION INTRAVENOUS at 19:03

## 2021-01-01 RX ADMIN — INSULIN LISPRO 4 UNITS: 100 INJECTION, SOLUTION INTRAVENOUS; SUBCUTANEOUS at 22:34

## 2021-01-01 RX ADMIN — PANTOPRAZOLE SODIUM 40 MG: 40 TABLET, DELAYED RELEASE ORAL at 05:20

## 2021-01-01 RX ADMIN — DULOXETINE HYDROCHLORIDE 60 MG: 60 CAPSULE, DELAYED RELEASE ORAL at 17:08

## 2021-01-01 RX ADMIN — OXYBUTYNIN CHLORIDE 5 MG: 5 TABLET, EXTENDED RELEASE ORAL at 08:37

## 2021-01-01 RX ADMIN — INSULIN DETEMIR 15 UNITS: 100 INJECTION, SOLUTION SUBCUTANEOUS at 10:32

## 2021-01-01 RX ADMIN — HYDROMORPHONE HYDROCHLORIDE 0.5 MG: 1 INJECTION, SOLUTION INTRAMUSCULAR; INTRAVENOUS; SUBCUTANEOUS at 14:14

## 2021-01-01 RX ADMIN — PRAVASTATIN SODIUM 40 MG: 40 TABLET ORAL at 23:21

## 2021-01-01 RX ADMIN — ESCITALOPRAM 5 MG: 5 TABLET, FILM COATED ORAL at 11:36

## 2021-01-01 RX ADMIN — LIDOCAINE 5% 1 PATCH: 700 PATCH TOPICAL at 10:37

## 2021-01-01 RX ADMIN — ACETAMINOPHEN 650 MG: 325 TABLET, FILM COATED ORAL at 00:16

## 2021-01-01 RX ADMIN — SALMETEROL XINAFOATE 1 PUFF: 50 POWDER, METERED ORAL; RESPIRATORY (INHALATION) at 08:54

## 2021-01-01 RX ADMIN — TIOTROPIUM BROMIDE 18 MCG: 18 CAPSULE ORAL; RESPIRATORY (INHALATION) at 08:23

## 2021-01-01 RX ADMIN — POLYETHYLENE GLYCOL 3350 17 G: 17 POWDER, FOR SOLUTION ORAL at 10:54

## 2021-01-01 RX ADMIN — DULOXETINE HYDROCHLORIDE 60 MG: 60 CAPSULE, DELAYED RELEASE ORAL at 10:23

## 2021-01-01 RX ADMIN — PROPOFOL 20 MG: 10 INJECTION, EMULSION INTRAVENOUS at 12:57

## 2021-01-01 RX ADMIN — TIOTROPIUM BROMIDE 18 MCG: 18 CAPSULE ORAL; RESPIRATORY (INHALATION) at 11:49

## 2021-01-01 RX ADMIN — ACETAMINOPHEN 650 MG: 325 TABLET, FILM COATED ORAL at 12:40

## 2021-01-01 RX ADMIN — HEPARIN SODIUM 5000 UNITS: 5000 INJECTION INTRAVENOUS; SUBCUTANEOUS at 08:34

## 2021-01-01 RX ADMIN — DULOXETINE HYDROCHLORIDE 60 MG: 60 CAPSULE, DELAYED RELEASE ORAL at 08:23

## 2021-01-01 RX ADMIN — POLYETHYLENE GLYCOL 3350 238 G: 17 POWDER, FOR SOLUTION ORAL at 17:16

## 2021-01-01 RX ADMIN — EPHEDRINE SULFATE 10 MG: 50 INJECTION, SOLUTION INTRAVENOUS at 15:35

## 2021-01-01 RX ADMIN — PROPOFOL 20 MG: 10 INJECTION, EMULSION INTRAVENOUS at 13:21

## 2021-01-01 RX ADMIN — FERROUS SULFATE TAB 325 MG (65 MG ELEMENTAL FE) 325 MG: 325 (65 FE) TAB at 08:11

## 2021-01-01 RX ADMIN — PRAVASTATIN SODIUM 80 MG: 80 TABLET ORAL at 18:42

## 2021-01-01 RX ADMIN — SODIUM CHLORIDE 8 MG/HR: 9 INJECTION, SOLUTION INTRAVENOUS at 22:04

## 2021-01-01 RX ADMIN — DOCUSATE SODIUM 100 MG: 100 CAPSULE, LIQUID FILLED ORAL at 10:27

## 2021-01-01 RX ADMIN — ESCITALOPRAM 5 MG: 5 TABLET, FILM COATED ORAL at 08:17

## 2021-01-01 RX ADMIN — HYDROMORPHONE HYDROCHLORIDE 0.5 MG: 1 INJECTION, SOLUTION INTRAMUSCULAR; INTRAVENOUS; SUBCUTANEOUS at 13:44

## 2021-01-01 RX ADMIN — DULOXETINE HYDROCHLORIDE 60 MG: 60 CAPSULE, DELAYED RELEASE ORAL at 17:18

## 2021-01-01 RX ADMIN — ESCITALOPRAM 5 MG: 5 TABLET, FILM COATED ORAL at 17:14

## 2021-01-01 RX ADMIN — OXYCODONE HYDROCHLORIDE 5 MG: 5 TABLET ORAL at 20:31

## 2021-01-01 RX ADMIN — HEPARIN SODIUM 5000 UNITS: 5000 INJECTION INTRAVENOUS; SUBCUTANEOUS at 20:21

## 2021-01-01 RX ADMIN — ACETAMINOPHEN 650 MG: 325 TABLET, FILM COATED ORAL at 06:18

## 2021-01-01 RX ADMIN — GLUCAGON HYDROCHLORIDE 0.5 MG: KIT at 13:18

## 2021-01-01 RX ADMIN — GABAPENTIN 100 MG: 100 CAPSULE ORAL at 17:08

## 2021-01-01 RX ADMIN — METOPROLOL TARTRATE 12.5 MG: 25 TABLET, FILM COATED ORAL at 22:08

## 2021-01-01 RX ADMIN — INSULIN LISPRO 2 UNITS: 100 INJECTION, SOLUTION INTRAVENOUS; SUBCUTANEOUS at 17:19

## 2021-01-01 RX ADMIN — DULOXETINE HYDROCHLORIDE 60 MG: 60 CAPSULE, DELAYED RELEASE ORAL at 08:11

## 2021-01-01 RX ADMIN — INSULIN DETEMIR 15 UNITS: 100 INJECTION, SOLUTION SUBCUTANEOUS at 08:34

## 2021-01-01 RX ADMIN — ACETAMINOPHEN 650 MG: 325 TABLET, FILM COATED ORAL at 05:20

## 2021-01-01 RX ADMIN — INSULIN LISPRO 1 UNITS: 100 INJECTION, SOLUTION INTRAVENOUS; SUBCUTANEOUS at 11:46

## 2021-01-01 RX ADMIN — METOPROLOL TARTRATE 12.5 MG: 25 TABLET, FILM COATED ORAL at 22:28

## 2021-01-01 RX ADMIN — NEOSTIGMINE METHYLSULFATE 3.5 MG: 1 INJECTION INTRAVENOUS at 17:36

## 2021-01-01 RX ADMIN — PANTOPRAZOLE SODIUM 40 MG: 40 TABLET, DELAYED RELEASE ORAL at 06:25

## 2021-01-01 RX ADMIN — LABETALOL 20 MG/4 ML (5 MG/ML) INTRAVENOUS SYRINGE 10 MG: at 02:07

## 2021-01-01 RX ADMIN — INSULIN DETEMIR 8 UNITS: 100 INJECTION, SOLUTION SUBCUTANEOUS at 08:09

## 2021-01-01 RX ADMIN — OXYCODONE HYDROCHLORIDE 2.5 MG: 5 TABLET ORAL at 09:27

## 2021-01-01 RX ADMIN — ACETAMINOPHEN 650 MG: 325 TABLET, FILM COATED ORAL at 05:47

## 2021-01-01 RX ADMIN — GABAPENTIN 100 MG: 100 CAPSULE ORAL at 17:54

## 2021-01-01 RX ADMIN — PROPOFOL 50 MG: 10 INJECTION, EMULSION INTRAVENOUS at 14:10

## 2021-01-01 RX ADMIN — HYDROMORPHONE HYDROCHLORIDE 0.5 MG: 1 INJECTION, SOLUTION INTRAMUSCULAR; INTRAVENOUS; SUBCUTANEOUS at 23:51

## 2021-01-01 RX ADMIN — PROPOFOL 20 MG: 10 INJECTION, EMULSION INTRAVENOUS at 13:25

## 2021-01-01 RX ADMIN — GABAPENTIN 100 MG: 100 CAPSULE ORAL at 16:59

## 2021-01-01 RX ADMIN — PRAVASTATIN SODIUM 80 MG: 80 TABLET ORAL at 17:00

## 2021-01-01 RX ADMIN — GABAPENTIN 100 MG: 100 CAPSULE ORAL at 17:00

## 2021-01-01 RX ADMIN — PRAVASTATIN SODIUM 80 MG: 80 TABLET ORAL at 16:15

## 2021-01-01 RX ADMIN — GABAPENTIN 100 MG: 100 CAPSULE ORAL at 10:09

## 2021-01-01 RX ADMIN — TIOTROPIUM BROMIDE 18 MCG: 18 CAPSULE ORAL; RESPIRATORY (INHALATION) at 10:34

## 2021-01-26 DIAGNOSIS — R32 URINARY INCONTINENCE IN FEMALE: ICD-10-CM

## 2021-01-26 RX ORDER — SOLIFENACIN SUCCINATE 5 MG/1
TABLET, FILM COATED ORAL
Qty: 30 TABLET | Refills: 3 | Status: SHIPPED | OUTPATIENT
Start: 2021-01-26 | End: 2021-01-01 | Stop reason: HOSPADM

## 2021-02-08 ENCOUNTER — OFFICE VISIT (OUTPATIENT)
Dept: INTERNAL MEDICINE CLINIC | Facility: CLINIC | Age: 81
End: 2021-02-08
Payer: COMMERCIAL

## 2021-02-08 VITALS
OXYGEN SATURATION: 96 % | HEART RATE: 71 BPM | RESPIRATION RATE: 16 BRPM | WEIGHT: 125 LBS | TEMPERATURE: 98.4 F | DIASTOLIC BLOOD PRESSURE: 70 MMHG | SYSTOLIC BLOOD PRESSURE: 128 MMHG | BODY MASS INDEX: 25.25 KG/M2

## 2021-02-08 DIAGNOSIS — I10 ESSENTIAL HYPERTENSION: ICD-10-CM

## 2021-02-08 DIAGNOSIS — E11.649 UNCONTROLLED TYPE 2 DIABETES MELLITUS WITH HYPOGLYCEMIA WITHOUT COMA (HCC): Primary | ICD-10-CM

## 2021-02-08 DIAGNOSIS — I70.213 ATHEROSCLEROSIS OF NATIVE ARTERY OF BOTH LOWER EXTREMITIES WITH INTERMITTENT CLAUDICATION (HCC): ICD-10-CM

## 2021-02-08 DIAGNOSIS — E78.2 MIXED HYPERLIPIDEMIA: ICD-10-CM

## 2021-02-08 LAB — SL AMB POCT HEMOGLOBIN AIC: 6.1 (ref ?–6.5)

## 2021-02-08 PROCEDURE — 83036 HEMOGLOBIN GLYCOSYLATED A1C: CPT | Performed by: INTERNAL MEDICINE

## 2021-02-08 PROCEDURE — 99214 OFFICE O/P EST MOD 30 MIN: CPT | Performed by: INTERNAL MEDICINE

## 2021-02-08 PROCEDURE — 3725F SCREEN DEPRESSION PERFORMED: CPT | Performed by: INTERNAL MEDICINE

## 2021-02-08 NOTE — PROGRESS NOTES
Assessment/Plan:     Chronic problems appear stable  Continue follow-up with Cardiology  No change in medications but she can try and start taking her insulin at dinner time but if she still has low normal sugars, she may need to move the timing all the way to the morning  Quality Measures:       Return in about 4 months (around 6/8/2021)  No problem-specific Assessment & Plan notes found for this encounter  Diagnoses and all orders for this visit:    Uncontrolled type 2 diabetes mellitus with hypoglycemia without coma ([facility] [address] )  -     POCT hemoglobin A1c  -     Comprehensive metabolic panel; Future  -     CBC and differential; Future  -     Lipid panel; Future    Atherosclerosis of native artery of both lower extremities with intermittent claudication (HCC)    Essential hypertension    Mixed hyperlipidemia    Other orders  -     vitamin E 100 UNIT capsule; Take 100 Units by mouth daily          Subjective:      Patient ID: Mendy Wilson is a 79 y o  female  Patient comes in today for routine follow-up  She states she is doing okay  She just had trouble finding the new office so she was a little agitated  Her sugars are well controlled  She is noting that some mornings her sugars are low normal   She feels fine but it makes her nervous  She is taking her insulin at bedtime  Her heart disease is stable  She is current with her cardiologist   Her cholesterol has been controlled  Arthritis is about the same  She has been pretty much staying at home during the pandemic  No issues in that regards  She did have her 1st COVID vaccine dose  ALLERGIES:  Allergies   Allergen Reactions    Calcipotriene Hives    Lactose GI Intolerance    Penicillin V Hives     Category:  Allergy;     Propoxyphene Rash       CURRENT MEDICATIONS:    Current Outpatient Medications:     amLODIPine (NORVASC) 5 mg tablet, take 1 tablet by mouth once daily, Disp: 90 tablet, Rfl: 0    BD Pen Needle Prabha U/F 32G X 4 MM MISC, USE ONCE DAILY AS DIRECTED BY DOCTOR, Disp: 100 each, Rfl: 5    Blood Glucose Monitoring Suppl (ONE TOUCH ULTRA 2) w/Device KIT, by Does not apply route 2 (two) times a day, Disp: 1 each, Rfl: 3    clopidogrel (PLAVIX) 75 mg tablet, take 1 tablet by mouth once daily, Disp: 30 tablet, Rfl: 3    Continuous Blood Gluc  (FREESTYLE RIAZ READER) MAURY, 1 Units by Does not apply route 4 (four) times a day, Disp: 1 Device, Rfl: 0    Continuous Blood Gluc Sensor (FREESTYLE RIAZ SENSOR SYSTEM) MISC, Check sugars 4 times a day or as needed  , Disp: 3 each, Rfl: 0    DULoxetine (CYMBALTA) 60 mg delayed release capsule, take 1 capsule by mouth twice a day, Disp: 180 capsule, Rfl: 1    ferrous sulfate 325 (65 Fe) mg tablet, Take 325 mg by mouth, Disp: , Rfl:     furosemide (LASIX) 20 mg tablet, take 1 tablet by mouth once daily, Disp: 30 tablet, Rfl: 3    gabapentin (NEURONTIN) 100 mg capsule, take 1 capsule by mouth three times a day, Disp: 90 capsule, Rfl: 3    glucose blood test strip, TEST twice a day, Disp: 100 each, Rfl: 5    Lancets (ONETOUCH ULTRASOFT) lancets, Check sugar twice a day, Disp: 100 each, Rfl: 5    LEVEMIR FLEXTOUCH 100 units/mL injection pen, inject subcutaneously 15 units daily, Disp: 15 mL, Rfl: 3    lisinopril (ZESTRIL) 20 mg tablet, take 2 tablets by mouth once daily, Disp: 180 tablet, Rfl: 1    metFORMIN (GLUCOPHAGE) 1000 MG tablet, take 1 tablet by mouth twice a day with meals, Disp: 60 tablet, Rfl: 3    metoprolol tartrate (LOPRESSOR) 25 mg tablet, Take 25 mg by mouth 2 (two) times a day, Disp: , Rfl:     nicotine (NICODERM CQ) 14 mg/24hr TD 24 hr patch, Place 1 patch on the skin every 24 hours, Disp: , Rfl:     nitroglycerin (NITROSTAT) 0 4 mg SL tablet, 1 tab(s), Disp: , Rfl:     simvastatin (ZOCOR) 40 mg tablet, take 1 tablet by mouth once daily, Disp: 30 tablet, Rfl: 3    solifenacin (VESICARE) 5 mg tablet, take 1 tablet by mouth once daily, Disp: 30 tablet, Rfl: 3    benzonatate (TESSALON PERLES) 100 mg capsule, Take 1 capsule (100 mg total) by mouth 3 (three) times a day as needed for cough (Patient not taking: Reported on 2/8/2021), Disp: 20 capsule, Rfl: 0    vitamin E 100 UNIT capsule, Take 100 Units by mouth daily, Disp: , Rfl:     ACTIVE PROBLEM LIST:  Patient Active Problem List   Diagnosis    Allergic rhinitis    Atherosclerosis of native artery of both lower extremities with intermittent claudication (HCC)    Carotid stenosis, bilateral    Carpal tunnel syndrome on both sides    Depression    Diabetes mellitus type 2, uncontrolled ([facility] [address] )    Diabetic peripheral neuropathy ([facility] [address] )    Hyperlipidemia    Essential hypertension    Mesenteric artery stenosis (HCC)    Seborrheic keratosis    Skin lesion    Vitamin D deficiency    Cerebral infarction ([facility] [address] )    Peripheral neuropathy    Trigger little finger of right hand    Urinary incontinence in female    Vertigo    Degenerative joint disease (DJD) of lumbar spine    Pain of right hip joint       PAST MEDICAL HISTORY:  Past Medical History:   Diagnosis Date    Colonic polyp     Diverticulosis     large intestine without hemmorhage    T12 compression fracture ([facility] [address] ) 3/1/2019       PAST SURGICAL HISTORY:  Past Surgical History:   Procedure Laterality Date    BREAST SURGERY      CAROTID ENDARTERECTOMY      CORONARY ANGIOPLASTY WITH STENT PLACEMENT      leg and chest    CORONARY ANGIOPLASTY WITH STENT PLACEMENT      adominal stent    CORONARY ARTERY BYPASS GRAFT      triple artery    TONSILLECTOMY         FAMILY HISTORY:  Family History   Problem Relation Age of Onset    Lung cancer Mother     Migraines Mother     Stroke Brother        SOCIAL HISTORY:  Social History     Socioeconomic History    Marital status:       Spouse name: Not on file    Number of children: Not on file    Years of education: Not on file    Highest education level: Not on file   Occupational History Comment: retired   Social Needs    Financial resource strain: Not on file    Food insecurity     Worry: Not on file     Inability: Not on file   [employer] needs     Medical: Not on file     Non-medical: Not on file   Tobacco Use    Smoking status: Former Smoker    Smokeless tobacco: Never Used   Substance and Sexual Activity    Alcohol use: Yes    Drug use: No    Sexual activity: Never     Partners: Male   Lifestyle    Physical activity     Days per week: Not on file     Minutes per session: Not on file    Stress: Not on file   Relationships    Social connections     Talks on phone: Not on file     Gets together: Not on file     Attends Hinduism service: Not on file     Active member of club or organization: Not on file     Attends meetings of clubs or organizations: Not on file     Relationship status: Not on file    Intimate partner violence     Fear of current or ex partner: Not on file     Emotionally abused: Not on file     Physically abused: Not on file     Forced sexual activity: Not on file   Other Topics Concern    Not on file   Social History Narrative    Not on file       Review of Systems   Respiratory: Negative for shortness of breath  Cardiovascular: Negative for chest pain  Gastrointestinal: Negative for abdominal pain  Objective:  Vitals:    02/08/21 1358   BP: 128/70   Pulse: 71   Resp: 16   Temp: 98 4 °F (36 9 °C)   SpO2: 96%   Weight: 56 7 kg (125 lb)     Body mass index is 25 25 kg/m²  Physical Exam  Vitals signs and nursing note reviewed  Constitutional:       Appearance: She is well-developed  Cardiovascular:      Rate and Rhythm: Normal rate and regular rhythm  Heart sounds: Normal heart sounds  Pulmonary:      Effort: Pulmonary effort is normal       Breath sounds: Normal breath sounds  Abdominal:      Palpations: Abdomen is soft  Tenderness: There is no abdominal tenderness     Neurological:      Mental Status: She is alert and oriented to person, place, and time  RESULTS:    Recent Results (from the past 1008 hour(s))   POCT hemoglobin A1c    Collection Time: 02/08/21  2:27 PM   Result Value Ref Range    Hemoglobin A1C 6 1 6 5       This note was created with voice recognition software  Phonic, grammatical and spelling errors may be present within the note as a result

## 2021-02-12 DIAGNOSIS — I73.9 PAD (PERIPHERAL ARTERY DISEASE) (HCC): ICD-10-CM

## 2021-02-12 RX ORDER — CLOPIDOGREL BISULFATE 75 MG/1
TABLET ORAL
Qty: 30 TABLET | Refills: 5 | Status: SHIPPED | OUTPATIENT
Start: 2021-02-12 | End: 2021-06-01

## 2021-02-21 DIAGNOSIS — I10 ESSENTIAL HYPERTENSION: ICD-10-CM

## 2021-02-21 DIAGNOSIS — E11.649 UNCONTROLLED TYPE 2 DIABETES MELLITUS WITH HYPOGLYCEMIA WITHOUT COMA (HCC): ICD-10-CM

## 2021-02-22 RX ORDER — FUROSEMIDE 20 MG/1
TABLET ORAL
Qty: 30 TABLET | Refills: 5 | Status: SHIPPED | OUTPATIENT
Start: 2021-02-22 | End: 2021-03-22 | Stop reason: SDUPTHER

## 2021-02-24 DIAGNOSIS — E11.649 UNCONTROLLED TYPE 2 DIABETES MELLITUS WITH HYPOGLYCEMIA WITHOUT COMA (HCC): ICD-10-CM

## 2021-02-24 RX ORDER — BLOOD SUGAR DIAGNOSTIC
STRIP MISCELLANEOUS
Qty: 100 EACH | Refills: 3 | Status: SHIPPED | OUTPATIENT
Start: 2021-02-24

## 2021-03-16 ENCOUNTER — TELEMEDICINE (OUTPATIENT)
Dept: INTERNAL MEDICINE CLINIC | Facility: CLINIC | Age: 81
End: 2021-03-16
Payer: COMMERCIAL

## 2021-03-16 DIAGNOSIS — J06.9 VIRAL UPPER RESPIRATORY TRACT INFECTION: ICD-10-CM

## 2021-03-16 DIAGNOSIS — R05.9 COUGH: Primary | ICD-10-CM

## 2021-03-16 PROCEDURE — 99214 OFFICE O/P EST MOD 30 MIN: CPT | Performed by: INTERNAL MEDICINE

## 2021-03-16 PROCEDURE — U0005 INFEC AGEN DETEC AMPLI PROBE: HCPCS | Performed by: INTERNAL MEDICINE

## 2021-03-16 PROCEDURE — U0003 INFECTIOUS AGENT DETECTION BY NUCLEIC ACID (DNA OR RNA); SEVERE ACUTE RESPIRATORY SYNDROME CORONAVIRUS 2 (SARS-COV-2) (CORONAVIRUS DISEASE [COVID-19]), AMPLIFIED PROBE TECHNIQUE, MAKING USE OF HIGH THROUGHPUT TECHNOLOGIES AS DESCRIBED BY CMS-2020-01-R: HCPCS | Performed by: INTERNAL MEDICINE

## 2021-03-16 RX ORDER — AZITHROMYCIN 250 MG/1
TABLET, FILM COATED ORAL
Qty: 6 TABLET | Refills: 0 | Status: SHIPPED | OUTPATIENT
Start: 2021-03-16 | End: 2021-03-20

## 2021-03-16 NOTE — PROGRESS NOTES
COVID-19 Virtual Visit     Assessment/Plan:    Problem List Items Addressed This Visit     None      Visit Diagnoses     Cough    -  Primary    Relevant Orders    Novel Coronavirus (COVID-19), PCR [facility] Collected in Office    Viral upper respiratory tract infection        Relevant Medications    azithromycin (ZITHROMAX) 250 mg tablet    Other Relevant Orders    Novel Coronavirus (COVID-19), PCR [facility] Collected in Office         Disposition:     I have spent 10 minutes directly with the patient  Greater than 50% of this time was spent in counseling/coordination of care regarding: risks and benefits of treatment options, instructions for management, importance of treatment compliance and risk factor reductions  Encounter provider Ghulam Guerra MD    Provider located at [address] [facility]  [address] [facility]  [facility] [address]  [address] [facility] [id number] [phone]    Recent Visits  No visits were found meeting these conditions  Showing recent visits within past 7 days and meeting all other requirements     Today's Visits  Date Type Provider Dept   03/16/21 Abram Gordon MD [facility] [address] today's visits and meeting all other requirements     Future Appointments  No visits were found meeting these conditions  Showing future appointments within next 150 days and meeting all other requirements      This virtual check-in was done via [employer] Teams and patient was informed that this is a secure, HIPAA-compliant platform  She agrees to proceed  Patient agrees to participate in a virtual check in via telephone or video visit instead of presenting to the office to address urgent/immediate medical needs  Patient is aware this is a billable service  After connecting through [facility], the patient was identified by name and date of birth   Mendy Wilson was informed that this was a telemedicine visit and that the exam was being conducted confidentially over secure lines  My office door was closed  No one else was in the room  Nancy Patterson acknowledged consent and understanding of privacy and security of the telemedicine visit  I informed the patient that I have reviewed her record in Epic and presented the opportunity for her to ask any questions regarding the visit today  The patient agreed to participate  Subjective:   Nancy Patterson is a 79 y o  female who is concerned about COVID-19  Patient's symptoms include chills, fatigue, sore throat and myalgias       Exposure:   Contact with a person who is under investigation (PUI) for or who is positive for COVID-19 within the last 14 days?: No    Hospitalized recently for fever and/or lower respiratory symptoms?: No      Currently a healthcare worker that is involved in direct patient care?: No      Works in a special setting where the risk of COVID-19 transmission may be high? (this may include long-term care, correctional and skilled nursing facilities; homeless shelters; assisted-living facilities and group homes ): No      Resident in a special setting where the risk of COVID-19 transmission may be high? (this may include long-term care, correctional and skilled nursing facilities; homeless shelters; assisted-living facilities and group homes ): No      No results found for: SARSCOV2, [address] [facility], [facility], [facility] [address]  Past Medical History:   Diagnosis Date    Colonic polyp     Diverticulosis     large intestine without hemmorhage    T12 compression fracture ([facility] [address] ) 3/1/2019     Past Surgical History:   Procedure Laterality Date    BREAST SURGERY      CAROTID ENDARTERECTOMY      CORONARY ANGIOPLASTY WITH STENT PLACEMENT      leg and chest    CORONARY ANGIOPLASTY WITH STENT PLACEMENT      adominal stent    CORONARY ARTERY BYPASS GRAFT      triple artery    TONSILLECTOMY       Current Outpatient Medications   Medication Sig Dispense Refill    amLODIPine (NORVASC) 5 mg tablet take 1 tablet by mouth once daily 90 tablet 0    BD Pen Needle Prabha U/F 32G X 4 MM MISC USE ONCE DAILY AS DIRECTED BY DOCTOR 100 each 5    Blood Glucose Monitoring Suppl (ONE TOUCH ULTRA 2) w/Device KIT by Does not apply route 2 (two) times a day 1 each 3    clopidogrel (PLAVIX) 75 mg tablet take 1 tablet by mouth once daily 30 tablet 5    Continuous Blood Gluc  (FREESTYLE RIAZ READER) MAURY 1 Units by Does not apply route 4 (four) times a day 1 Device 0    Continuous Blood Gluc Sensor (FREESTYLE RIAZ SENSOR SYSTEM) MISC Check sugars 4 times a day or as needed   3 each 0    DULoxetine (CYMBALTA) 60 mg delayed release capsule take 1 capsule by mouth twice a day 180 capsule 1    ferrous sulfate 325 (65 Fe) mg tablet Take 325 mg by mouth      furosemide (LASIX) 20 mg tablet take 1 tablet by mouth once daily 30 tablet 5    gabapentin (NEURONTIN) 100 mg capsule take 1 capsule by mouth three times a day 90 capsule 3    Lancets (ONETOUCH ULTRASOFT) lancets Check sugar twice a day 100 each 5    LEVEMIR FLEXTOUCH 100 units/mL injection pen inject subcutaneously 15 units daily 15 mL 3    lisinopril (ZESTRIL) 20 mg tablet take 2 tablets by mouth once daily 180 tablet 1    metFORMIN (GLUCOPHAGE) 1000 MG tablet take 1 tablet by mouth twice a day with meals 60 tablet 5    metoprolol tartrate (LOPRESSOR) 25 mg tablet Take 25 mg by mouth 2 (two) times a day      OneTouch Ultra test strip use 1 TEST STRIP to TEST BLOOD SUGAR twice a day 100 each 3    simvastatin (ZOCOR) 40 mg tablet take 1 tablet by mouth once daily 30 tablet 3    solifenacin (VESICARE) 5 mg tablet take 1 tablet by mouth once daily 30 tablet 3    vitamin E 100 UNIT capsule Take 100 Units by mouth daily      azithromycin (ZITHROMAX) 250 mg tablet Take 2 tablets today then 1 tablet daily x 4 days 6 tablet 0    benzonatate (TESSALON PERLES) 100 mg capsule Take 1 capsule (100 mg total) by mouth 3 (three) times a day as needed for cough (Patient not taking: Reported on 2/8/2021) 20 capsule 0    nicotine (NICODERM CQ) 14 mg/24hr TD 24 hr patch Place 1 patch on the skin every 24 hours      nitroglycerin (NITROSTAT) 0 4 mg SL tablet 1 tab(s)       No current facility-administered medications for this visit  Allergies   Allergen Reactions    Calcipotriene Hives    Lactose GI Intolerance    Penicillin V Hives     Category: Allergy;     Propoxyphene Rash       Review of Systems   Constitutional: Positive for chills and fatigue  HENT: Positive for sore throat  Musculoskeletal: Positive for myalgias  All other systems reviewed and are negative  Objective: There were no vitals filed for this visit  Physical Exam  Vitals signs and nursing note reviewed  Constitutional:       Appearance: Normal appearance  She is ill-appearing  Pulmonary:      Effort: Pulmonary effort is normal    Neurological:      General: No focal deficit present  Mental Status: She is alert and oriented to person, place, and time  Psychiatric:         Mood and Affect: Mood normal          Behavior: Behavior normal        VIRTUAL VISIT DISCLAIMER    Tiffanie Pham acknowledges that she has consented to an online visit or consultation  She understands that the online visit is based solely on information provided by her, and that, in the absence of a face-to-face physical evaluation by the physician, the diagnosis she receives is both limited and provisional in terms of accuracy and completeness  This is not intended to replace a full medical face-to-face evaluation by the physician  Tiffanie Pham understands and accepts these terms

## 2021-03-17 ENCOUNTER — TELEPHONE (OUTPATIENT)
Dept: INTERNAL MEDICINE CLINIC | Facility: CLINIC | Age: 81
End: 2021-03-17

## 2021-03-17 LAB — SARS-COV-2 RNA RESP QL NAA+PROBE: NEGATIVE

## 2021-03-17 NOTE — TELEPHONE ENCOUNTER
Please call Michael Hart with the Covid test results for his mom  He is on the  Medical Communication Consent Form that is scanned in Media  He also had his 2 covid shots and wanted to know if she is positive does he need to quarantine as well?     Call back #[phone]

## 2021-03-22 ENCOUNTER — APPOINTMENT (OUTPATIENT)
Dept: LAB | Facility: CLINIC | Age: 81
End: 2021-03-22
Payer: COMMERCIAL

## 2021-03-22 ENCOUNTER — OFFICE VISIT (OUTPATIENT)
Dept: INTERNAL MEDICINE CLINIC | Facility: CLINIC | Age: 81
End: 2021-03-22
Payer: COMMERCIAL

## 2021-03-22 VITALS
TEMPERATURE: 98.4 F | HEIGHT: 59 IN | DIASTOLIC BLOOD PRESSURE: 74 MMHG | OXYGEN SATURATION: 93 % | SYSTOLIC BLOOD PRESSURE: 134 MMHG | WEIGHT: 130.4 LBS | BODY MASS INDEX: 26.29 KG/M2 | HEART RATE: 78 BPM

## 2021-03-22 DIAGNOSIS — I50.9 CONGESTIVE HEART FAILURE, UNSPECIFIED HF CHRONICITY, UNSPECIFIED HEART FAILURE TYPE (HCC): ICD-10-CM

## 2021-03-22 DIAGNOSIS — I50.9 CONGESTIVE HEART FAILURE, UNSPECIFIED HF CHRONICITY, UNSPECIFIED HEART FAILURE TYPE (HCC): Primary | ICD-10-CM

## 2021-03-22 DIAGNOSIS — I10 ESSENTIAL HYPERTENSION: ICD-10-CM

## 2021-03-22 PROCEDURE — 36415 COLL VENOUS BLD VENIPUNCTURE: CPT

## 2021-03-22 PROCEDURE — 99214 OFFICE O/P EST MOD 30 MIN: CPT | Performed by: INTERNAL MEDICINE

## 2021-03-22 PROCEDURE — 3075F SYST BP GE 130 - 139MM HG: CPT | Performed by: INTERNAL MEDICINE

## 2021-03-22 PROCEDURE — 1160F RVW MEDS BY RX/DR IN RCRD: CPT | Performed by: INTERNAL MEDICINE

## 2021-03-22 PROCEDURE — 3078F DIAST BP <80 MM HG: CPT | Performed by: INTERNAL MEDICINE

## 2021-03-22 PROCEDURE — 80053 COMPREHEN METABOLIC PANEL: CPT

## 2021-03-22 RX ORDER — FUROSEMIDE 20 MG/1
40 TABLET ORAL DAILY
Qty: 30 TABLET | Refills: 5 | Status: SHIPPED | OUTPATIENT
Start: 2021-03-22 | End: 2021-05-11 | Stop reason: ALTCHOICE

## 2021-03-22 NOTE — PROGRESS NOTES
Assessment/Plan:      Patient with c/o sob and "just not feeling well" over past few days  H/o chf- on lasix 20 mg; weight gain of 6-8 pounds with crackles on exam  B/L LE edema  She was recently treated with z pack for presumed URI  Increase lasix to 40 mg  Obtain CMP  No problem-specific Assessment & Plan notes found for this encounter  Diagnoses and all orders for this visit:    Congestive heart failure, unspecified HF chronicity, unspecified heart failure type ([facility] [address] )  -     Comprehensive metabolic panel; Future    Essential hypertension  -     furosemide (LASIX) 20 mg tablet; Take 2 tablets (40 mg total) by mouth daily          Subjective:      Patient ID: Joe Dorsey is a 79 y o  female  HPI    The following portions of the patient's history were reviewed and updated as appropriate:   She  has a past medical history of Colonic polyp, Diverticulosis, and T12 compression fracture ([facility] [address] ) (3/1/2019)    She   Patient Active Problem List    Diagnosis Date Noted    Pain of right hip joint 02/28/2019    Degenerative joint disease (DJD) of lumbar spine 07/11/2018    Atherosclerosis of native artery of both lower extremities with intermittent claudication ([facility] [address] ) 11/07/2017    Mesenteric artery stenosis ([facility] [address] [facility] ) 11/07/2017    Diabetic peripheral neuropathy ([facility] [address] ) 05/16/2017    Carpal tunnel syndrome on both sides 02/17/2017    Cerebral infarction ([facility] [address] ) 06/07/2016    Carotid stenosis, bilateral 01/14/2016    Trigger little finger of right hand 01/14/2016    Seborrheic keratosis 11/19/2015    Peripheral neuropathy 10/19/2015    Urinary incontinence in female 10/14/2015    Skin lesion 08/06/2015    Diabetes mellitus type 2, uncontrolled ([address] ) 05/26/2015    Vitamin D deficiency 05/26/2015    Allergic rhinitis 01/20/2015    Depression 01/20/2015    Hyperlipidemia 01/20/2015    Essential hypertension 01/20/2015    Vertigo 09/11/2014     She  has a past surgical history that includes Breast surgery; Coronary angioplasty with stent; Coronary angioplasty with stent; Coronary artery bypass graft; Carotid endarterectomy; and Tonsillectomy  Her family history includes Lung cancer in her mother; Migraines in her mother; Stroke in her brother  She  reports that she has quit smoking  She has never used smokeless tobacco  She reports current alcohol use  She reports that she does not use drugs  Current Outpatient Medications   Medication Sig Dispense Refill    amLODIPine (NORVASC) 5 mg tablet take 1 tablet by mouth once daily 90 tablet 0    BD Pen Needle Prabha U/F 32G X 4 MM MISC USE ONCE DAILY AS DIRECTED BY DOCTOR 100 each 5    Blood Glucose Monitoring Suppl (ONE TOUCH ULTRA 2) w/Device KIT by Does not apply route 2 (two) times a day 1 each 3    clopidogrel (PLAVIX) 75 mg tablet take 1 tablet by mouth once daily 30 tablet 5    Continuous Blood Gluc  (FREESTYLE RIAZ READER) MAURY 1 Units by Does not apply route 4 (four) times a day 1 Device 0    Continuous Blood Gluc Sensor (FREESTYLE RIAZ SENSOR SYSTEM) MISC Check sugars 4 times a day or as needed   3 each 0    DULoxetine (CYMBALTA) 60 mg delayed release capsule take 1 capsule by mouth twice a day 180 capsule 1    ferrous sulfate 325 (65 Fe) mg tablet Take 325 mg by mouth      furosemide (LASIX) 20 mg tablet Take 2 tablets (40 mg total) by mouth daily 30 tablet 5    gabapentin (NEURONTIN) 100 mg capsule take 1 capsule by mouth three times a day 90 capsule 3    Lancets (ONETOUCH ULTRASOFT) lancets Check sugar twice a day 100 each 5    LEVEMIR FLEXTOUCH 100 units/mL injection pen inject subcutaneously 15 units daily 15 mL 3    lisinopril (ZESTRIL) 20 mg tablet take 2 tablets by mouth once daily 180 tablet 1    metFORMIN (GLUCOPHAGE) 1000 MG tablet take 1 tablet by mouth twice a day with meals 60 tablet 5    metoprolol tartrate (LOPRESSOR) 25 mg tablet Take 25 mg by mouth 2 (two) times a day      nicotine (NICODERM CQ) 14 mg/24hr TD 24 hr patch Place 1 patch on the skin every 24 hours      nitroglycerin (NITROSTAT) 0 4 mg SL tablet 1 tab(s)      OneTouch Ultra test strip use 1 TEST STRIP to TEST BLOOD SUGAR twice a day 100 each 3    simvastatin (ZOCOR) 40 mg tablet take 1 tablet by mouth once daily 30 tablet 3    solifenacin (VESICARE) 5 mg tablet take 1 tablet by mouth once daily 30 tablet 3    vitamin E 100 UNIT capsule Take 100 Units by mouth daily      benzonatate (TESSALON PERLES) 100 mg capsule Take 1 capsule (100 mg total) by mouth 3 (three) times a day as needed for cough (Patient not taking: Reported on 2/8/2021) 20 capsule 0     No current facility-administered medications for this visit  Current Outpatient Medications on File Prior to Visit   Medication Sig    amLODIPine (NORVASC) 5 mg tablet take 1 tablet by mouth once daily    BD Pen Needle Prabha U/F 32G X 4 MM MISC USE ONCE DAILY AS DIRECTED BY DOCTOR    Blood Glucose Monitoring Suppl (ONE TOUCH ULTRA 2) w/Device KIT by Does not apply route 2 (two) times a day    clopidogrel (PLAVIX) 75 mg tablet take 1 tablet by mouth once daily    Continuous Blood Gluc  (FREESTYLE RIAZ READER) MAURY 1 Units by Does not apply route 4 (four) times a day    Continuous Blood Gluc Sensor (FREESTYLE RIAZ SENSOR SYSTEM) MISC Check sugars 4 times a day or as needed      DULoxetine (CYMBALTA) 60 mg delayed release capsule take 1 capsule by mouth twice a day    ferrous sulfate 325 (65 Fe) mg tablet Take 325 mg by mouth    gabapentin (NEURONTIN) 100 mg capsule take 1 capsule by mouth three times a day    Lancets (ONETOUCH ULTRASOFT) lancets Check sugar twice a day    LEVEMIR FLEXTOUCH 100 units/mL injection pen inject subcutaneously 15 units daily    lisinopril (ZESTRIL) 20 mg tablet take 2 tablets by mouth once daily    metFORMIN (GLUCOPHAGE) 1000 MG tablet take 1 tablet by mouth twice a day with meals    metoprolol tartrate (LOPRESSOR) 25 mg tablet Take 25 mg by mouth 2 (two) times a day    nicotine (NICODERM CQ) 14 mg/24hr TD 24 hr patch Place 1 patch on the skin every 24 hours    nitroglycerin (NITROSTAT) 0 4 mg SL tablet 1 tab(s)    OneTouch Ultra test strip use 1 TEST STRIP to TEST BLOOD SUGAR twice a day    simvastatin (ZOCOR) 40 mg tablet take 1 tablet by mouth once daily    solifenacin (VESICARE) 5 mg tablet take 1 tablet by mouth once daily    vitamin E 100 UNIT capsule Take 100 Units by mouth daily    [DISCONTINUED] furosemide (LASIX) 20 mg tablet take 1 tablet by mouth once daily    benzonatate (TESSALON PERLES) 100 mg capsule Take 1 capsule (100 mg total) by mouth 3 (three) times a day as needed for cough (Patient not taking: Reported on 2/8/2021)     No current facility-administered medications on file prior to visit  She is allergic to calcipotriene; lactose; penicillin v; and propoxyphene       Review of Systems   Constitutional: Positive for activity change, fatigue and unexpected weight change  Negative for appetite change, chills and fever  Respiratory: Positive for cough and shortness of breath  Negative for choking, chest tightness, wheezing and stridor  Musculoskeletal: Positive for gait problem  Negative for arthralgias  Psychiatric/Behavioral: Negative for hallucinations  The patient is not nervous/anxious and is not hyperactive  All other systems reviewed and are negative  Objective:      /74 (BP Location: Left arm, Patient Position: Sitting, Cuff Size: Adult)   Pulse 78   Temp 98 4 °F (36 9 °C) (Temporal)   Ht 4' 11" (1 499 m)   Wt 59 1 kg (130 lb 6 4 oz) Comment: pt refused  SpO2 93%   BMI 26 34 kg/m²          Physical Exam  Vitals signs and nursing note reviewed  Constitutional:       Appearance: Normal appearance  HENT:      Head: Normocephalic and atraumatic        Right Ear: Tympanic membrane normal       Left Ear: Tympanic membrane normal       Nose: Nose normal       Mouth/Throat:      Mouth: Mucous membranes are moist  Neck:      Musculoskeletal: Normal range of motion  Cardiovascular:      Rate and Rhythm: Normal rate and regular rhythm  Pulmonary:      Effort: Pulmonary effort is normal       Breath sounds: Rales present  Abdominal:      General: Bowel sounds are normal       Palpations: Abdomen is soft  Musculoskeletal: Normal range of motion  Right lower leg: Edema present  Left lower leg: Edema present  Skin:     General: Skin is warm and dry  Neurological:      General: No focal deficit present  Mental Status: She is alert and oriented to person, place, and time     Psychiatric:         Mood and Affect: Mood normal          Behavior: Behavior normal

## 2021-03-23 DIAGNOSIS — E11.42 DIABETIC PERIPHERAL NEUROPATHY (HCC): ICD-10-CM

## 2021-03-23 LAB
ALBUMIN SERPL BCP-MCNC: 3.7 G/DL (ref 3.5–5)
ALP SERPL-CCNC: 82 U/L (ref 46–116)
ALT SERPL W P-5'-P-CCNC: 37 U/L (ref 12–78)
ANION GAP SERPL CALCULATED.3IONS-SCNC: 7 MMOL/L (ref 4–13)
AST SERPL W P-5'-P-CCNC: 34 U/L (ref 5–45)
BILIRUB SERPL-MCNC: 0.82 MG/DL (ref 0.2–1)
BUN SERPL-MCNC: 16 MG/DL (ref 5–25)
CALCIUM SERPL-MCNC: 9 MG/DL (ref 8.3–10.1)
CHLORIDE SERPL-SCNC: 106 MMOL/L (ref 100–108)
CO2 SERPL-SCNC: 23 MMOL/L (ref 21–32)
CREAT SERPL-MCNC: 0.78 MG/DL (ref 0.6–1.3)
GFR SERPL CREATININE-BSD FRML MDRD: 72 ML/MIN/1.73SQ M
GLUCOSE P FAST SERPL-MCNC: 73 MG/DL (ref 65–99)
POTASSIUM SERPL-SCNC: 4.6 MMOL/L (ref 3.5–5.3)
PROT SERPL-MCNC: 7.5 G/DL (ref 6.4–8.2)
SODIUM SERPL-SCNC: 136 MMOL/L (ref 136–145)

## 2021-03-23 RX ORDER — GABAPENTIN 100 MG/1
CAPSULE ORAL
Qty: 90 CAPSULE | Refills: 3 | Status: SHIPPED | OUTPATIENT
Start: 2021-03-23 | End: 2021-06-01

## 2021-04-05 ENCOUNTER — TELEPHONE (OUTPATIENT)
Dept: INTERNAL MEDICINE CLINIC | Facility: CLINIC | Age: 81
End: 2021-04-05

## 2021-04-05 DIAGNOSIS — E11.42 DIABETIC PERIPHERAL NEUROPATHY (HCC): Primary | ICD-10-CM

## 2021-04-05 DIAGNOSIS — R26.81 UNSTEADY GAIT: ICD-10-CM

## 2021-04-05 DIAGNOSIS — I50.9 CONGESTIVE HEART FAILURE, UNSPECIFIED HF CHRONICITY, UNSPECIFIED HEART FAILURE TYPE (HCC): ICD-10-CM

## 2021-04-05 NOTE — TELEPHONE ENCOUNTER
Patient's son called and said that patient is not getting around to well any more and wanted to know if a walker with a seat on it could be ordered for patient  Of course all dx has to be on the order  Please advise          'Please send to [facility]  Fax# [phone]   # [phone]

## 2021-04-24 DIAGNOSIS — E11.649 UNCONTROLLED TYPE 2 DIABETES MELLITUS WITH HYPOGLYCEMIA WITHOUT COMA (HCC): ICD-10-CM

## 2021-04-26 RX ORDER — INSULIN DETEMIR 100 [IU]/ML
INJECTION, SOLUTION SUBCUTANEOUS
Qty: 15 ML | Refills: 3 | Status: SHIPPED | OUTPATIENT
Start: 2021-04-26 | End: 2021-01-01 | Stop reason: SDUPTHER

## 2021-05-03 ENCOUNTER — TELEPHONE (OUTPATIENT)
Dept: INTERNAL MEDICINE CLINIC | Facility: CLINIC | Age: 81
End: 2021-05-03

## 2021-05-03 NOTE — TELEPHONE ENCOUNTER
Patient was in [facility] ER  She is requesting a script for a Home bond Nurse Aid, 35 hours a week  Please call when ready for

## 2021-05-04 ENCOUNTER — TRANSITIONAL CARE MANAGEMENT (OUTPATIENT)
Dept: INTERNAL MEDICINE CLINIC | Facility: CLINIC | Age: 81
End: 2021-05-04

## 2021-05-04 NOTE — TELEPHONE ENCOUNTER
Patient's son called and said that all we have to do is send an order to Vicki @ [place] requesting a Home aid? Appt has been scheduled for patient on 5/11

## 2021-05-07 ENCOUNTER — RA CDI HCC (OUTPATIENT)
Dept: OTHER | Facility: HOSPITAL | Age: 81
End: 2021-05-07

## 2021-05-07 ENCOUNTER — TELEPHONE (OUTPATIENT)
Dept: INTERNAL MEDICINE CLINIC | Facility: CLINIC | Age: 81
End: 2021-05-07

## 2021-05-07 NOTE — TELEPHONE ENCOUNTER
Shanice Wright is reporting that Christin Brito has +3 bilateral extremity swelling in both legs with bruising  She did use Tubigrip for compression  They weighed her today and it was 125  It increased from hospital stay  She also had a high sugar reading of 415  She just ate cake and a meal and did not take her medication yet  OT will be going back out later today and they will have her recheck her sugar  Any questions or concerns Shanice Wright can be reached at [phone]  RN/[employer]

## 2021-05-08 NOTE — PROGRESS NOTES
[facility]  coding opportunities             Chart reviewed, (number of) suggestions sent to provider: 1              Visit status: Patient arrived for their scheduled appointment     Provider never responded to [place] [id number]  coding request     CC coding opportunities             Chart reviewed, (number of) suggestions sent to provider: 1    E11 51  Type 2 diabetes mellitus with diabetic peripheral angiopathy without gangrene - combination code per ICD10 guidelines to denote link between PVD and DM

## 2021-05-10 ENCOUNTER — TELEPHONE (OUTPATIENT)
Dept: INTERNAL MEDICINE CLINIC | Facility: CLINIC | Age: 81
End: 2021-05-10

## 2021-05-10 DIAGNOSIS — F32.A ANXIETY AND DEPRESSION: ICD-10-CM

## 2021-05-10 DIAGNOSIS — F41.9 ANXIETY AND DEPRESSION: ICD-10-CM

## 2021-05-10 DIAGNOSIS — I73.9 PAD (PERIPHERAL ARTERY DISEASE) (HCC): ICD-10-CM

## 2021-05-10 DIAGNOSIS — I10 ESSENTIAL HYPERTENSION: ICD-10-CM

## 2021-05-10 NOTE — TELEPHONE ENCOUNTER
John Paul Bennett asks for all new scripts and also needs an updated med list faxed over   [phone]     Will start blister packing meds for her so they need to verify all meds to do

## 2021-05-11 ENCOUNTER — OFFICE VISIT (OUTPATIENT)
Dept: INTERNAL MEDICINE CLINIC | Facility: CLINIC | Age: 81
End: 2021-05-11
Payer: COMMERCIAL

## 2021-05-11 VITALS
HEART RATE: 60 BPM | SYSTOLIC BLOOD PRESSURE: 116 MMHG | OXYGEN SATURATION: 98 % | WEIGHT: 130 LBS | HEIGHT: 59 IN | RESPIRATION RATE: 16 BRPM | BODY MASS INDEX: 26.21 KG/M2 | DIASTOLIC BLOOD PRESSURE: 76 MMHG | TEMPERATURE: 98 F

## 2021-05-11 DIAGNOSIS — E78.2 MIXED HYPERLIPIDEMIA: ICD-10-CM

## 2021-05-11 DIAGNOSIS — R04.0 NOSEBLEED: ICD-10-CM

## 2021-05-11 DIAGNOSIS — D50.9 IRON DEFICIENCY ANEMIA, UNSPECIFIED IRON DEFICIENCY ANEMIA TYPE: Primary | ICD-10-CM

## 2021-05-11 PROCEDURE — 99495 TRANSJ CARE MGMT MOD F2F 14D: CPT | Performed by: INTERNAL MEDICINE

## 2021-05-11 RX ORDER — LISINOPRIL 2.5 MG/1
2.5 TABLET ORAL DAILY
COMMUNITY
Start: 2021-04-30 | End: 2021-05-13 | Stop reason: SDUPTHER

## 2021-05-11 RX ORDER — TRAMADOL HYDROCHLORIDE 50 MG/1
TABLET ORAL
COMMUNITY
Start: 2021-05-03 | End: 2021-01-01 | Stop reason: SDUPTHER

## 2021-05-11 RX ORDER — SIMVASTATIN 40 MG
TABLET ORAL
Qty: 30 TABLET | Refills: 3 | Status: SHIPPED | OUTPATIENT
Start: 2021-05-11 | End: 2021-05-13 | Stop reason: SDUPTHER

## 2021-05-11 RX ORDER — ASPIRIN 81 MG/1
81 TABLET, CHEWABLE ORAL DAILY
COMMUNITY
End: 2021-01-01 | Stop reason: HOSPADM

## 2021-05-11 NOTE — PROGRESS NOTES
Assessment/Plan:       She seems to be slowly improving  Will recheck her blood count  Hopefully the visiting nurses can draw that from home  Continue follow-up with Cardiology  Agree that the Watchman procedure would be a good option for her  Her medication list was updated but still unsure what the water pill is and how much potassium  Quality Measures:       Return in about 4 weeks (around 6/8/2021)  No problem-specific Assessment & Plan notes found for this encounter  Diagnoses and all orders for this visit:    Iron deficiency anemia, unspecified iron deficiency anemia type    Nosebleed    Other orders  -     aspirin 81 mg chewable tablet; Chew 81 mg daily  -     traMADol (ULTRAM) 50 mg tablet; take 1 tablet by mouth every 6 hours if needed for MODERATE PAIN ( PAIN SCALE 4-6 )  -     lisinopril (ZESTRIL) 2 5 mg tablet; Take 2 5 mg by mouth daily  -     CBC and differential; Future          Subjective:      Patient ID: Judy Roth is a 79 y o  female  Patient comes in today with her son for hospital follow-up  She started getting a nose bleed that was quite severe and became quite anemic  She had been placed on a new blood thinner because of her other medical problems but the combination was too much once she started with a nose bleed  Her hemoglobin dropped to around 6  She did require transfusion  She is now only on Plavix  They just saw her cardiologist   Apparently her diuretic was just changed from furosemide  Her cardiologist suggested she be evaluated for a Watchman procedure so that she can hopefully come off of some of her blood thinners and get better control of her heart  Several changes were made in her medication  Spent a lot of time reviewing those with her son  Hospital records were reviewed      TCM Call (since 4/10/2021)     Date and time call was made  5/4/2021 10:36 AM    Hospital care reviewed  Records reviewed    Patient was hospitial[facility] at  [facility]        Date of Admission  04/26/21    Date of discharge  05/03/21    Disposition  Home    Current Symptoms  None      TCM Call (since 4/10/2021)     Post hospital issues  None    Should patient be enrolled in anticoag monitoring? No    Scheduled for follow up? Yes    Did you obtain your prescribed medications  Yes    Do you need help managing your prescriptions or medications  No    Is transportation to your appointment needed  No    I have advised the patient to call PCP with any new or worsening symptoms    Christina Richard/ma    Living Arrangements  Family members    Support System  None    Are you recieving any outpatient services  No    Are you recieving home care services  Yes    Types of home care services  Nurse visit    Are you using any community resources  No    Current waiver services  No    Have you fallen in the last 12 months  No    Interperter language line needed  No    Counseling  Family          ALLERGIES:  Allergies   Allergen Reactions    Calcipotriene Hives    Lactose - Food Allergy GI Intolerance    Penicillin V Hives     Category:  Allergy;     Propoxyphene Rash       CURRENT MEDICATIONS:    Current Outpatient Medications:     aspirin 81 mg chewable tablet, Chew 81 mg daily, Disp: , Rfl:     BD Pen Needle Prabha U/F 32G X 4 MM MISC, USE ONCE DAILY AS DIRECTED BY DOCTOR, Disp: 100 each, Rfl: 5    clopidogrel (PLAVIX) 75 mg tablet, take 1 tablet by mouth once daily, Disp: 30 tablet, Rfl: 5    DULoxetine (CYMBALTA) 60 mg delayed release capsule, take 1 capsule by mouth twice a day, Disp: 180 capsule, Rfl: 1    ferrous sulfate 325 (65 Fe) mg tablet, Take 325 mg by mouth, Disp: , Rfl:     gabapentin (NEURONTIN) 100 mg capsule, take 1 capsule by mouth three times a day, Disp: 90 capsule, Rfl: 3    Lancets (ONETOUCH ULTRASOFT) lancets, Check sugar twice a day, Disp: 100 each, Rfl: 5    Levemir FlexTouch 100 units/mL injection pen, inject 15 units subcutaneously once daily, Disp: 15 mL, Rfl: 3    metFORMIN (GLUCOPHAGE) 1000 MG tablet, take 1 tablet by mouth twice a day with meals, Disp: 60 tablet, Rfl: 5    metoprolol tartrate (LOPRESSOR) 25 mg tablet, Take 25 mg by mouth 2 (two) times a day, Disp: , Rfl:     OneTouch Ultra test strip, use 1 TEST STRIP to TEST BLOOD SUGAR twice a day, Disp: 100 each, Rfl: 3    simvastatin (ZOCOR) 40 mg tablet, take 1 tablet by mouth daily, Disp: 30 tablet, Rfl: 3    solifenacin (VESICARE) 5 mg tablet, take 1 tablet by mouth once daily, Disp: 30 tablet, Rfl: 3    Continuous Blood Gluc  (FREESTYLE RIAZ READER) MAURY, 1 Units by Does not apply route 4 (four) times a day, Disp: 1 Device, Rfl: 0    lisinopril (ZESTRIL) 2 5 mg tablet, Take 2 5 mg by mouth daily, Disp: , Rfl:     nicotine (NICODERM CQ) 14 mg/24hr TD 24 hr patch, Place 1 patch on the skin every 24 hours, Disp: , Rfl:     nitroglycerin (NITROSTAT) 0 4 mg SL tablet, 1 tab(s), Disp: , Rfl:     traMADol (ULTRAM) 50 mg tablet, take 1 tablet by mouth every 6 hours if needed for MODERATE PAIN ( PAIN SCALE 4-6 ), Disp: , Rfl:     ACTIVE PROBLEM LIST:  Patient Active Problem List   Diagnosis    Allergic rhinitis    Atherosclerosis of native artery of both lower extremities with intermittent claudication (HCC)    Carotid stenosis, bilateral    Carpal tunnel syndrome on both sides    Depression    Diabetes mellitus type 2, uncontrolled ([address] )    Diabetic peripheral neuropathy ([facility] [address] )    Hyperlipidemia    Essential hypertension    Mesenteric artery stenosis (HCC)    Seborrheic keratosis    Skin lesion    Vitamin D deficiency    Cerebral infarction (HCC)    Peripheral neuropathy    Trigger little finger of right hand    Urinary incontinence in female    Vertigo    Degenerative joint disease (DJD) of lumbar spine    Pain of right hip joint       PAST MEDICAL HISTORY:  Past Medical History:   Diagnosis Date    Colonic polyp     Diverticulosis     large intestine without hemmorhage    T12 compression fracture ([facility] [address] ) 3/1/2019       PAST SURGICAL HISTORY:  Past Surgical History:   Procedure Laterality Date    BREAST SURGERY      CAROTID ENDARTERECTOMY      CORONARY ANGIOPLASTY WITH STENT PLACEMENT      leg and chest    CORONARY ANGIOPLASTY WITH STENT PLACEMENT      adominal stent    CORONARY ARTERY BYPASS GRAFT      triple artery    TONSILLECTOMY         FAMILY HISTORY:  Family History   Problem Relation Age of Onset    Lung cancer Mother     Migraines Mother     Stroke Brother        SOCIAL HISTORY:  Social History     Socioeconomic History    Marital status:      Spouse name: Not on file    Number of children: Not on file    Years of education: Not on file    Highest education level: Not on file   Occupational History     Comment: retired   Social Needs    Financial resource strain: Not on file    Food insecurity     Worry: Not on file     Inability: Not on file   [employer] needs     Medical: Not on file     Non-medical: Not on file   Tobacco Use    Smoking status: Former Smoker    Smokeless tobacco: Never Used   Substance and Sexual Activity    Alcohol use:  Yes    Drug use: No    Sexual activity: Never     Partners: Male   Lifestyle    Physical activity     Days per week: Not on file     Minutes per session: Not on file    Stress: Not on file   Relationships    Social connections     Talks on phone: Not on file     Gets together: Not on file     Attends Jain service: Not on file     Active member of club or organization: Not on file     Attends meetings of clubs or organizations: Not on file     Relationship status: Not on file    Intimate partner violence     Fear of current or ex partner: Not on file     Emotionally abused: Not on file     Physically abused: Not on file     Forced sexual activity: Not on file   Other Topics Concern    Not on file   Social History Narrative    Not on file       Review of Systems   Respiratory: Negative for shortness of breath  Cardiovascular: Negative for chest pain  Gastrointestinal: Negative for abdominal pain  Objective:  Vitals:    05/11/21 1538   BP: 116/76   BP Location: Left arm   Patient Position: Sitting   Cuff Size: Standard   Pulse: 60   Resp: 16   Temp: 98 °F (36 7 °C)   TempSrc: Tympanic   SpO2: 98%   Weight: 59 kg (130 lb)   Height: 4' 11" (1 499 m)     Body mass index is 26 26 kg/m²  Physical Exam  Vitals signs and nursing note reviewed  Constitutional:       Appearance: She is well-developed  Cardiovascular:      Rate and Rhythm: Normal rate and regular rhythm  Heart sounds: Normal heart sounds  Pulmonary:      Effort: Pulmonary effort is normal       Breath sounds: Normal breath sounds  Abdominal:      Palpations: Abdomen is soft  Tenderness: There is no abdominal tenderness  Neurological:      Mental Status: She is alert and oriented to person, place, and time  RESULTS:    Recent Results (from the past 1008 hour(s))   HEMOGLOBIN A1C    Collection Time: 04/26/21  7:22 PM   Result Value Ref Range    Hemoglobin A1C 5 9 (H) <5 7 %    eAG, EST AVG Glucose 123 <154 mg/dL   NOVEL [facility]COVID-19[facility]    Collection Time: 04/29/21  3:00 PM    Specimen type and source: Separate Accession, Nasopharynx (LAB)   Result Value Ref Range    SARS Coronavirus 2 PCR Not Detected Not Detected    Specimen Description NASOPHARYNGEAL SWAB     First test? UNKNOWN     Prior test type? Not applicable     Prior test result? Not applicable     Prior test date?       ^^^UNKNA^Unknown or not applicable^L^^^Unknown or not applicable    Employed in healthcare setting? No     Symptomatic as defined by CDC? No     Date of symptom onset?       ^^^UNKNA^Unknown or not applicable^L^^^Unknown or not applicable    Currently hospitalized? YES     In ICU? No     Resident in congregate care setting? No     Pregnant?  Not applicable    NOVEL CORONAVIRUS (COVID-19), PCR [facility] Collection Time: 05/02/21 10:57 AM    Specimen type and source: Separate Accession, Nasopharynx (LAB)   Result Value Ref Range    SARS Coronavirus 2 PCR Not Detected Not Detected    Specimen Description NASOPHARYNGEAL SWAB     First test? No     Prior test type? MOLECULAR     Prior test result? NOT DETECTED     Prior test date?       ^^^UNKNA^Unknown or not applicable^L^^^Unknown or not applicable    Employed in healthcare setting? No     Symptomatic as defined by CDC? No     Date of symptom onset?       ^^^UNKNA^Unknown or not applicable^L^^^Unknown or not applicable    Currently hospitalized? YES     In ICU? No     Resident in congregate care setting? No     Pregnant? Not applicable        This note was created with voice recognition software  Phonic, grammatical and spelling errors may be present within the note as a result

## 2021-05-12 ENCOUNTER — TELEPHONE (OUTPATIENT)
Dept: INTERNAL MEDICINE CLINIC | Facility: CLINIC | Age: 81
End: 2021-05-12

## 2021-05-13 ENCOUNTER — TELEPHONE (OUTPATIENT)
Dept: INTERNAL MEDICINE CLINIC | Facility: CLINIC | Age: 81
End: 2021-05-13

## 2021-05-13 DIAGNOSIS — E78.2 MIXED HYPERLIPIDEMIA: ICD-10-CM

## 2021-05-13 DIAGNOSIS — F32.A ANXIETY AND DEPRESSION: ICD-10-CM

## 2021-05-13 DIAGNOSIS — I10 ESSENTIAL HYPERTENSION: ICD-10-CM

## 2021-05-13 DIAGNOSIS — D50.9 IRON DEFICIENCY ANEMIA, UNSPECIFIED IRON DEFICIENCY ANEMIA TYPE: Primary | ICD-10-CM

## 2021-05-13 DIAGNOSIS — F41.9 ANXIETY AND DEPRESSION: ICD-10-CM

## 2021-05-13 DIAGNOSIS — E11.649 UNCONTROLLED TYPE 2 DIABETES MELLITUS WITH HYPOGLYCEMIA WITHOUT COMA (HCC): ICD-10-CM

## 2021-05-13 RX ORDER — SIMVASTATIN 40 MG
40 TABLET ORAL DAILY
Qty: 90 TABLET | Refills: 0 | Status: SHIPPED | OUTPATIENT
Start: 2021-05-13 | End: 2021-01-01

## 2021-05-13 RX ORDER — DULOXETIN HYDROCHLORIDE 60 MG/1
60 CAPSULE, DELAYED RELEASE ORAL 2 TIMES DAILY
Qty: 180 CAPSULE | Refills: 1 | Status: SHIPPED | OUTPATIENT
Start: 2021-05-13 | End: 2021-01-01

## 2021-05-13 RX ORDER — FERROUS SULFATE 325(65) MG
325 TABLET ORAL
Qty: 90 TABLET | Refills: 0 | Status: SHIPPED | OUTPATIENT
Start: 2021-05-13 | End: 2021-01-01 | Stop reason: HOSPADM

## 2021-05-13 RX ORDER — LISINOPRIL 2.5 MG/1
2.5 TABLET ORAL DAILY
Qty: 90 TABLET | Refills: 0 | Status: SHIPPED | OUTPATIENT
Start: 2021-05-13 | End: 2021-01-01 | Stop reason: HOSPADM

## 2021-05-13 NOTE — TELEPHONE ENCOUNTER
Pharmacy called about carpidogral and Randol Scale a cy interaction being on both meds as per pharmacy     Please advise     [address]

## 2021-05-13 NOTE — TELEPHONE ENCOUNTER
Patient's son called and said that [employer] has been trying to get in contact with us to order the Pill pack for patient  Through conversation they have been calling the old phone #  Patient's son is asking that we call [facility] @ [phone] to give them the patient medication list and orders      Son said patient is back on the Ambar and would like that added to her med list

## 2021-05-14 ENCOUNTER — TELEPHONE (OUTPATIENT)
Dept: INTERNAL MEDICINE CLINIC | Facility: CLINIC | Age: 81
End: 2021-05-14

## 2021-05-14 NOTE — TELEPHONE ENCOUNTER
Yes, she's sort of stuck taking both  No option is perfect but it looks like she needs them both at this point

## 2021-05-14 NOTE — TELEPHONE ENCOUNTER
Patient went to the ER yesterday due to hematoma on her right wrist  And the cardiologist also put her back on Xeralto and she has a concern because of the bleeding  Jule Koyanagi would like to know if you could order a Free Style Carrie Savant that goes in her arm?

## 2021-05-14 NOTE — TELEPHONE ENCOUNTER
I spoke with Quang Hackett her son  He says that she is taking the Plavix and xarelto  He stated that dr Jagdish Trivedi told him that she needs to be on them to prevent her from having A stroke   He wants to know if this is ok in your opinion

## 2021-05-14 NOTE — TELEPHONE ENCOUNTER
May try to order but she only takes one insulin shot a day so it may not be covered  Also have her inform Dr Mary Anne Bales of the hematoma but her specialists really believe she needs to be on the blood thinners

## 2021-05-19 ENCOUNTER — TELEPHONE (OUTPATIENT)
Dept: INTERNAL MEDICINE CLINIC | Facility: CLINIC | Age: 81
End: 2021-05-19

## 2021-05-19 DIAGNOSIS — D50.9 IRON DEFICIENCY ANEMIA, UNSPECIFIED IRON DEFICIENCY ANEMIA TYPE: Primary | ICD-10-CM

## 2021-05-19 NOTE — TELEPHONE ENCOUNTER
It's usually hard to admit to rehab outside the hospitals but we can contact [facility] and see what options are available  The visiting nurse agency may have a  as well

## 2021-05-20 ENCOUNTER — PATIENT OUTREACH (OUTPATIENT)
Dept: INTERNAL MEDICINE CLINIC | Facility: CLINIC | Age: 81
End: 2021-05-20

## 2021-05-20 DIAGNOSIS — Z78.9 NEED FOR FOLLOW-UP BY SOCIAL WORKER: Primary | ICD-10-CM

## 2021-05-20 NOTE — PROGRESS NOTES
OPCM,  is responding to consult from PCP requesting assistance for patient with STR placement  Patient lives alone and requires increased assistance with care  Patient's son is overwhelmed  Telephone call placed to patient's son, Edmundo Ceballos and I introduced myself and explained my role  He was cooperative and receptive to my call  Edmundo Ceballos informed me that patient lives alone in [facility]  Patient has a 80 yr old boyfriend, who also lives in the building and checks in on her  Edmundo Ceballos informed me that patient transfers with a walker but she is mostly w/c dependent  He is concerned about leaving her alone  Patient receives Visiting Nurses and home PT from [facility] but he does not feel that is enough of assistance for patient  He would like patient to go to [facility] and he has already been in contact with [address] [place] [address]  Patient receives $935 00 monthly from [facility] and he denies that she has any other income  Patient also receives [employer] [place]  I provided supportive counseling and information on community resources  Patient's  spouse was a  and patient may be eligible for the Aid and Attendance benefit  I provided information on the Aid and Attendance benefit and the Aging waiver program     Long term planning discussed and patient would like to remain living in her apartment when she is discharged from [facility]  Telephone call placed to Corinne Carlos [facility] and referral placed  She will reach out to patient's son to assist with the application process for Aid and Attendance  Telephone call placed to [facility] and I left a message for Germaine Reese in the intake dept and I requested that she return my call  I will request that patient receive a level of care assessment for waiver services      Telephone call placed to [address] [place] [address] and I left a message for [facility] [employer] [facility] in Admissions requesting that she return my call to discuss possible placement of patient for STR  I will continue to follow and assist with STR and additional in home services for patient

## 2021-05-21 ENCOUNTER — PATIENT OUTREACH (OUTPATIENT)
Dept: INTERNAL MEDICINE CLINIC | Facility: CLINIC | Age: 81
End: 2021-05-21

## 2021-05-21 NOTE — PROGRESS NOTES
Telephone call placed to [facility] to discuss possible STR bed for patient  Herbert Gallegos informed me that she has a meeting with Nursing Administration at 9 am and will call me back  Telephone call again to [facility] at 11:06 due to not hearing back and I left a message on her   Telephone call placed to patient's son, Dao Hidalgo to discuss placement of patient  He informed me that he took patient to her Cardiologist and Pulmonary doctors yesterday and they ordered a bunch of testing  He also reports that patient in in A-fib and is SOB and losing weight  Dao Hidalgo reports that Dr Sahu November, Cardiologist is going to admit patient to the hospital     I will continue to follow and provide ongoing support and assistance

## 2021-05-21 NOTE — PROGRESS NOTES
Telephone call received back from [facility]  I informed her of plan for patient to be admitted to the hospital per her son Ascencion Brigido Grigsby informed me that she will continue to follow patient

## 2021-06-01 DIAGNOSIS — E11.649 UNCONTROLLED TYPE 2 DIABETES MELLITUS WITH HYPOGLYCEMIA WITHOUT COMA (HCC): ICD-10-CM

## 2021-06-01 DIAGNOSIS — E11.42 DIABETIC PERIPHERAL NEUROPATHY (HCC): ICD-10-CM

## 2021-06-01 DIAGNOSIS — I73.9 PAD (PERIPHERAL ARTERY DISEASE) (HCC): ICD-10-CM

## 2021-06-01 RX ORDER — CLOPIDOGREL BISULFATE 75 MG/1
TABLET ORAL
Qty: 90 TABLET | Refills: 1 | Status: SHIPPED | OUTPATIENT
Start: 2021-06-01 | End: 2021-01-01

## 2021-06-01 RX ORDER — GABAPENTIN 100 MG/1
CAPSULE ORAL
Qty: 270 CAPSULE | Refills: 1 | Status: SHIPPED | OUTPATIENT
Start: 2021-06-01 | End: 2021-01-01 | Stop reason: HOSPADM

## 2021-06-05 DIAGNOSIS — E78.2 MIXED HYPERLIPIDEMIA: ICD-10-CM

## 2021-06-05 DIAGNOSIS — I10 ESSENTIAL HYPERTENSION: ICD-10-CM

## 2021-06-07 DIAGNOSIS — I10 ESSENTIAL HYPERTENSION: ICD-10-CM

## 2021-06-07 RX ORDER — SIMVASTATIN 40 MG
40 TABLET ORAL DAILY
Qty: 90 TABLET | Refills: 0 | OUTPATIENT
Start: 2021-06-07

## 2021-06-07 RX ORDER — RIVAROXABAN 20 MG/1
TABLET, FILM COATED ORAL
Qty: 90 TABLET | Refills: 0 | OUTPATIENT
Start: 2021-06-07

## 2021-06-08 ENCOUNTER — PATIENT OUTREACH (OUTPATIENT)
Dept: INTERNAL MEDICINE CLINIC | Facility: CLINIC | Age: 81
End: 2021-06-08

## 2021-06-08 NOTE — PROGRESS NOTES
Telephone call placed to patient's son, Lissy Mac to provide follow up communication  He informed me that patient is still in the hospital at [facility] and patient will need to have an angioplasty  He reports that patient will need to go to [facility] upon discharge but no discharge date known at this time  Lissy Mac agrees to contact me if I can be of any further assistance  I informed him that I would call him again next month for an update

## 2021-07-13 NOTE — PROGRESS NOTES
Telephone call placed to patient's son Wei Benz to provide follow up communication  He informed me that patient is still at [facility] and they are getting ready to discharge her  He does not feel that patient is progressing and is appealing her discharge  Patient has an appointment with Vascular today and Wei Benz reports that patient is still not able to walk  She is not getting up to go to the bathroom  I asked if the social wok staff is assisting with applying for waiver services for patient and he informed me that they were not assisting  Telephone call placed to Gaylon Fleischer, [employer] on Aging to discuss if she can complete a level of care assessment on patient  She suggests that the Nursing Home make a referral for the Transition to Home program for waiver services  Telephone call placed to [facility] and I left a message for , Amie Fletcher and I requested that she assist with waiver referral for patient  I will continue to follow and provide on going support and assistance

## 2021-07-13 NOTE — PROGRESS NOTES
Telephone call received from Grazyna Berumen,  at [employer]  She informed me that patient's son never mentioned wanting to apply for waiver services  Grazyna Berumen also reports that patient's insurance will no longer pay for additional skilled care and reports that patient is ambulating 200ft with a walker per PT  She informed me that patient and her son did not want to apply for MA to cover the additional stay at [facility]  Grazyna Berumen also reports that patient is competent and is requesting to return home  Grazyna Berumen agrees to speak to patient and son about waiver services and she agrees to contact the Aging office for a level of care assessment  She also agrees to assist with obtaining an appointment with the [facility] and with the Physician Certification form if patient is agreeable  I thanked her for returning my call

## 2021-07-22 NOTE — TELEPHONE ENCOUNTER
Leanne calling from [facility]  They sent over a prescription for incontinent supplies and they need Dr Amanda Joiner to sign it and sent it back over     Fax- [phone]

## 2021-08-04 NOTE — PROGRESS NOTES
Telephone call placed to patient's son, Jyoti Parra to provide follow up communication  He informed me that patient is still at [facility]  He reports that patient applied for long term MA and believes that referral was made for the waiver program     Jyoti Parra does not know when patient is going to be discharged  I informed him that I will call him in one month and provide assistance as needed  He was agreeable

## 2021-08-09 NOTE — TELEPHONE ENCOUNTER
pts son was  Asking if we got a requests from the property management group of hers,  They need more info to process the letter that states she would need a tub and a walk in shower at her apartment  Any idea?

## 2021-08-10 PROBLEM — K25.4 GASTROINTESTINAL HEMORRHAGE ASSOCIATED WITH GASTRIC ULCER: Status: ACTIVE | Noted: 2021-01-01

## 2021-08-10 NOTE — TELEPHONE ENCOUNTER
Spoke with Ely Prado and his mom is scheduled for 8/18  Prep instructions were given before they left the office

## 2021-08-10 NOTE — PROGRESS NOTES
Consultation - [facility] ([facility]  Rejicandida Ask 1940 79 y o  female     ASSESSMENT @ PLAN:   She is an 39-year-old female status post upper GI bleeding at [facility] for a gastric ulcer with visible vessel status post hemoclip x2 and epinephrine injection on May 27th  She has been off of his Xarelto aspirin and Plavix since then  She does need these products as she is a CABG patient, with atrial fibrillation and vascular bypass in the lower extremity  1 will do EGD to investigate    2 for now she will continue on her omeprazole 20 mg p o  b i d     3 if the endoscopy is negative we will tell Dr Cat Owens that she can go back on her antiplatelet medication and Xarelto    Her son is my patient    Chief Complaint:  Melena and upper GI bleeding and gastric ulcer    HPI:  She is an 39-year-old female with multiple medical problems here for GI evaluation  She had an upper GI bleed with transfusion in the end of May at [facility]   She had endoscopy on May 27th with I reviewed she had a gastric ulcer with hemoclip x2 and erosive gastritis and esophagitis  She had ischemic colitis with flexible sigmoidoscopy with poor prep  She is on omeprazole 20 mg p o  b i d  Her hemoglobin has climbed to 10 1 on an iron supplement  That blood level was from a few weeks ago  She has an extensive cardiac history with CABG x3  She has a history of a CVA  She has atrial fibrillation  She has been off of her antiplatelet and Xarelto medications since having her GI bleed  She has had no GI follow-up secondary to lack of GI follow-up from [facility]   She has no heartburn regurgitation dysphagia nausea vomiting abdominal pain melena hematochezia  REVIEW OF SYSTEMS:     CONSTITUTIONAL: Denies any fever, chills, or rigors  Good appetite, and no recent weight loss  HEENT: No earache or tinnitus  Denies hearing loss or visual disturbances    CARDIOVASCULAR: No chest pain or palpitations  RESPIRATORY: Denies any cough, hemoptysis, shortness of breath or dyspnea on exertion  GASTROINTESTINAL: As noted in the History of Present Illness  GENITOURINARY: No problems with urination  Denies any hematuria or dysuria  NEUROLOGIC: No dizziness or vertigo, denies headaches  MUSCULOSKELETAL: Denies any muscle or joint pain  SKIN: Denies skin rashes or itching  ENDOCRINE: Denies excessive thirst  Denies intolerance to heat or cold  PSYCHOSOCIAL: Denies depression or anxiety  Denies any recent memory loss  Past Medical History:   Diagnosis Date    Colonic polyp     Diverticulosis     large intestine without hemmorhage    T12 compression fracture ([facility] [address] ) 3/1/2019      Past Surgical History:   Procedure Laterality Date    BREAST SURGERY      CAROTID ENDARTERECTOMY      CORONARY ANGIOPLASTY WITH STENT PLACEMENT      leg and chest    CORONARY ANGIOPLASTY WITH STENT PLACEMENT      adominal stent    CORONARY ARTERY BYPASS GRAFT      triple artery    TONSILLECTOMY       Social History     Socioeconomic History    Marital status:      Spouse name: Not on file    Number of children: Not on file    Years of education: Not on file    Highest education level: Not on file   Occupational History     Comment: retired   Tobacco Use    Smoking status: Former Smoker    Smokeless tobacco: Never Used   Vaping Use    Vaping Use: Never used   Substance and Sexual Activity    Alcohol use: Yes    Drug use: No    Sexual activity: Never     Partners: Male   Other Topics Concern    Not on file   Social History Narrative    Not on file     Social Determinants of Health     Financial Resource Strain:     Difficulty of Paying Living Expenses:    Food Insecurity:     Worried About Running Out of Food in the Last Year:     [address] in the Last Year:    Transportation Needs:     Lack of Transportation (Medical):      Lack of Transportation (Non-Medical):    Physical Activity:     Days of Exercise per Week:     Minutes of Exercise per Session:    Stress:     Feeling of Stress :    Social Connections:     Frequency of Communication with Friends and Family:     Frequency of Social Gatherings with Friends and Family:     Attends Rastafarian Services:     Active Member of Clubs or Organizations:     Attends Club or Organization Meetings:     Marital Status:    Intimate Partner Violence:     Fear of Current or Ex-Partner:     Emotionally Abused:     Physically Abused:     Sexually Abused:      Family History   Problem Relation Age of Onset    Lung cancer Mother     Migraines Mother     Stroke Brother      Calcipotriene, Lactose - food allergy, Penicillin v, and Propoxyphene  Current Outpatient Medications   Medication Sig Dispense Refill    albuterol (2 5 mg/3 mL) 0 083 % nebulizer solution Inhale 2 5 mg every 4 (four) hours as needed      albuterol (2 5 mg/3 mL) 0 083 % nebulizer solution USE 1 AMPULE (3ML(2 5 MG TOTAL) BY NEBULIZATION EVERY 4 (FOUR) HOURS AS NEEDED FOR WHEEZING (J44 9)        Alcohol Swabs (Pharmacist Choice Alcohol) PADS 2 (two) times a day Use to test      BD Pen Needle Prabha U/F 32G X 4 MM MISC USE ONCE DAILY AS DIRECTED BY DOCTOR 100 each 5    Blood Glucose Monitoring Suppl (ONE TOUCH ULTRA 2) w/Device KIT 2 (two) times a day Use to test      cholestyramine (QUESTRAN) 4 g packet cholestyramine (with sugar) 4 gram powder for susp in a packet      Continuous Blood Gluc  (FREESTYLE RIAZ READER) MAURY 1 Units by Does not apply route 4 (four) times a day 1 Device 0    Docusate Sodium (DSS) 100 MG CAPS Take 100 mg by mouth 2 (two) times a day      DULoxetine (CYMBALTA) 60 mg delayed release capsule take 1 capsule by mouth twice a day 180 capsule 1    escitalopram (LEXAPRO) 5 mg tablet Take 5 mg by mouth daily      ferrous sulfate 325 (65 Fe) mg tablet Take 1 tablet (325 mg total) by mouth daily with breakfast 90 tablet 0    gabapentin (NEURONTIN) 100 mg capsule TAKE ONE(1) CAPSULE BY MOUTH THREE TIMES A  capsule 1    insulin glargine (LANTUS) 100 units/mL subcutaneous injection Inject 13 Units under the skin      insulin lispro (HumaLOG) 100 units/mL injection pen Inject 5 Units under the skin 4 (four) times a day      Lancet Devices (Easy Mini Eject Lancing Device) MISC 2 (two) times a day Use to test      Lancets (ONETOUCH ULTRASOFT) lancets Check sugar twice a day 100 each 5    Levemir FlexTouch 100 units/mL injection pen inject 15 units subcutaneously once daily 15 mL 3    lisinopril (ZESTRIL) 2 5 mg tablet Take 1 tablet (2 5 mg total) by mouth daily 90 tablet 0    metFORMIN (GLUCOPHAGE) 1000 MG tablet TAKE ONE TABLET BY MOUTH TWICE DAILY WITH MEALS 240 tablet 1    metoprolol tartrate (LOPRESSOR) 25 mg tablet TAKE 1/2 TABLET (12 5 MG TOTAL) BY MOUTH 2 (TWO) TIMES A DAY 90 tablet 0    nicotine (NICODERM CQ) 14 mg/24hr TD 24 hr patch Place 1 patch on the skin every 24 hours      nitroglycerin (NITROSTAT) 0 4 mg SL tablet 1 tab(s)      omeprazole (PriLOSEC) 20 mg delayed release capsule Take 20 mg by mouth daily      OneTouch Ultra test strip use 1 TEST STRIP to TEST BLOOD SUGAR twice a day 100 each 3    pantoprazole (PROTONIX) 40 mg tablet Take 40 mg by mouth      potassium chloride (Klor-Con) 10 mEq tablet Take 10 mEq by mouth 2 (two) times a day      psyllium (METAMUCIL) 58 6 % packet Take 1 packet by mouth daily      rivaroxaban (XARELTO) 20 mg tablet Take 1 tablet (20 mg total) by mouth daily 90 tablet 0    Santyl ointment APPLY TOPICALLY DAILY FOR 10 DAYS   simvastatin (ZOCOR) 40 mg tablet TAKE 1 TABLET (40 MG TOTAL) BY MOUTH DAILY 90 tablet 0    solifenacin (VESICARE) 5 mg tablet take 1 tablet by mouth once daily 30 tablet 3    Stiolto Respimat 2 5-2 5 MCG/ACT inhaler INHALE 2 PUFFS INTO THE LUNGS DAILY        torsemide (DEMADEX) 20 mg tablet Take 20 mg by mouth daily      traMADol (ULTRAM) 50 mg tablet take 1 tablet by mouth every 6 hours if needed for MODERATE PAIN ( PAIN SCALE 4-6 )      aspirin 81 mg chewable tablet Chew 81 mg daily (Patient not taking: Reported on 8/10/2021)      clopidogrel (PLAVIX) 75 mg tablet TAKE ONE (1) TABLET BY MOUTH DAILY (Patient not taking: Reported on 8/10/2021) 90 tablet 1     No current facility-administered medications for this visit  Blood pressure 118/70, pulse 80, not currently breastfeeding  PHYSICAL EXAM:     General Appearance:   Alert, cooperative, no distress, appears stated age    HEENT:   Normocephalic, atraumatic, anicteric      Neck:  Supple, symmetrical, trachea midline, no adenopathy;    thyroid: no enlargement/tenderness/nodules; no carotid  bruit or JVD    Lungs:   Clear to auscultation bilaterally; no rales, rhonchi or wheezing; respirations unlabored    Heart[de-identified]   S1 and S2 normal; regular rate and rhythm; no murmur, rub, or gallop     Abdomen:   Soft, non-tender, non-distended; normal bowel sounds; no masses, no organomegaly    Genitalia:   Deferred    Rectal:   Deferred    Extremities:  No cyanosis, clubbing or edema    Pulses:  2+ and symmetric all extremities    Skin:  Skin color, texture, turgor normal, no rashes or lesions    Lymph nodes:  No palpable cervical, axillary or inguinal lymphadenopathy        Lab Results   Component Value Date    WBC 4 75 06/06/2018    HGB 11 5 06/06/2018    HCT 36 3 06/06/2018    MCV 93 06/06/2018     06/06/2018     Lab Results   Component Value Date    GLUCOSE 180 (H) 07/31/2015    CALCIUM 9 0 03/22/2021     07/31/2015    K 4 6 03/22/2021    CO2 23 03/22/2021     03/22/2021    BUN 16 03/22/2021    CREATININE 0 78 03/22/2021     Lab Results   Component Value Date    ALT 37 03/22/2021    AST 34 03/22/2021    ALKPHOS 82 03/22/2021    BILITOT 0 7 07/31/2015     No results found for: INR, PROTIME

## 2021-08-10 NOTE — TELEPHONE ENCOUNTER
Callie Connolly called to schedule EGD for his mother for a day next week   Please call [phone] ty

## 2021-08-12 PROBLEM — R79.89 ELEVATED TROPONIN: Status: ACTIVE | Noted: 2021-01-01

## 2021-08-12 PROBLEM — R77.8 ELEVATED TROPONIN: Status: ACTIVE | Noted: 2021-01-01

## 2021-08-12 PROBLEM — Z95.0 PACEMAKER: Status: ACTIVE | Noted: 2021-01-01

## 2021-08-12 PROBLEM — D62 ACUTE ON CHRONIC BLOOD LOSS ANEMIA: Status: ACTIVE | Noted: 2021-01-01

## 2021-08-12 PROBLEM — R93.89 ABNORMAL CT OF THE CHEST: Status: ACTIVE | Noted: 2021-01-01

## 2021-08-12 NOTE — PROGRESS NOTES
Telephone call received from patient's son Renea Baeza this morning  He informed me that patient was taken to the ED due to unstable blood sugar  I reviewed the chart and patient is presently in the ED due to a Gi bleed  Renea Baeza told me that PVM was getting ready to discharge patient this week and she is not ready to come home  He believes that patient will need 24hr care when she comes home  I discussed the waiver program and told him to check with the  at [facility] to confirm that they applied for waiver services for patient  I informed him that patient may not be approved for 24hr care at home and suggested that he consider other options such as long term placement  Renea Baeza reports that patient is not able to live with him and he will discuss possible long term placement with her  Jorgetiffanie Baeza reports that patient needs assistance with getting up at night and he also reports that she becomes confused  He is concerned that patient will forget to take her medications  Renea Baeza informed me that patient was approved for MA but [facility] is charging a co payment for her stay  He informed me that he is still paying rent and the bills for patient's apartment  I advised him to speak with their financial office for billing information  Renea Baeza agrees to call me if he has any further questions  I will continue to follow and provide on going assistance and support for patient

## 2021-08-12 NOTE — TELEPHONE ENCOUNTER
Spoke with Eugenia Faulkner and let him know that Dr Aletta Aase is on call at the hospital, so if there are any GI procedures necessary, he will be there to do them  I will follow up regarding next weeks appt next week

## 2021-08-13 NOTE — ANESTHESIA PREPROCEDURE EVALUATION
Procedure:  COLONOSCOPY  EGD    Relevant Problems   CARDIO   (+) CAD (coronary artery disease) s/p CABG   (+) Chronic atrial fibrillation (HCC)   (+) Essential hypertension   (+) Hyperlipidemia      GI/HEPATIC   (+) Gastrointestinal hemorrhage associated with gastric ulcer      HEMATOLOGY   (+) Acute on chronic blood loss anemia      MUSCULOSKELETAL   (+) Degenerative joint disease (DJD) of lumbar spine      NEURO/PSYCH   (+) Atherosclerosis of native artery of both lower extremities with intermittent claudication (HCC)   (+) Depression   (+) Diabetic peripheral neuropathy (HCC)     PPM  Afib, on xarelto      Physical Exam    Airway    Mallampati score: II         Dental   upper dentures and lower dentures,     Cardiovascular  Rhythm: regular, Rate: normal,     Pulmonary  Pulmonary exam normal     Other Findings        Anesthesia Plan  ASA Score- 3     Anesthesia Type- IV sedation with anesthesia with ASA Monitors.         Additional Monitors:   Airway Plan:           Plan Factors-Exercise tolerance (METS): <4 METS.    Chart reviewed. Patient summary reviewed.    Patient is not a current smoker. Patient not instructed to abstain from smoking on day of procedure. Patient did not smoke on day of surgery.        Induction- intravenous.    Postoperative Plan-     Informed Consent- Anesthetic plan and risks discussed with patient.  I personally reviewed this patient with the CRNA. Discussed and agreed on the Anesthesia Plan with the CRNA..

## 2021-08-13 NOTE — ANESTHESIA POSTPROCEDURE EVALUATION
Post-Op Assessment Note    CV Status:  Stable  Pain Score: 0    Pain management: adequate     Mental Status:  Sleepy   PONV Controlled:  Controlled      Post Op Vitals Reviewed: Yes      Staff: Anesthesiologist, CRNA         No complications documented.    BP 97/56 (08/13/21 1451)    Temp 98.5 °F (36.9 °C) (08/13/21 1451)    Pulse 65 (08/13/21 1451)   Resp 20 (08/13/21 1451)    SpO2 98 % (08/13/21 1451)

## 2021-08-16 NOTE — TELEPHONE ENCOUNTER
Spoke with Beckie Hankins the  and the patient is scheduled for 8/19/21  They are aware she needs to be here at 7:45am and return around 3:30-3:45 pm   Faxed capsule endoscopy instructions to [employer]   [phone]

## 2021-08-16 NOTE — TELEPHONE ENCOUNTER
----- Message from Liv Rob MD sent at 8/14/2021  9:32 AM EDT -----  Please schedule her to have the video capsule endoscopy soon for melena

## 2021-08-16 NOTE — TELEPHONE ENCOUNTER
Spoke with son and he said she is still in the facility so I would have to call them to coordinate this appt  Their phone number is [phone]

## 2021-08-17 NOTE — TELEPHONE ENCOUNTER
[facility] called to confirm the instructions for the pill cam and wanted us to know that the patients hemoglobin is down to 6 7 this morning

## 2021-08-17 NOTE — ED PROVIDER NOTES
History  Chief Complaint   Patient presents with    Abnormal Lab     Patient hemoglobin 6 7  Per patient family member at bedside, "the doctor thinks shes loosing blood from lower intestine and she was just in here, left with a hemoglobin of 8  "      Patient is an 58-year-old female past medical history diabetes, hypertension, hyperlipidemia, CAD, atrial fibrillation, peptic ulcer disease, GI bleed, anemia, CHF presenting for low hemoglobin  Patient sent from nursing home with low hemoglobin, no value provided, but does note 1-2 weeks of multiple stools a day, soft but denies diarrhea, states that they are black and intermittently bloody  States that this has happened in the past and chart review reveals that patient has had GI bleed recently  Patient denies any symptoms, chest pain, shortness breath, fatigue, dizziness, abdominal pain, nausea/vomiting/diarrhea states she is eating and drinking normally  She is not sure whether not she is on blood thinners but chart shows no blood thinners but does appear to be on iron  Prior to Admission Medications   Prescriptions Last Dose Informant Patient Reported? Taking?    Alcohol Swabs (Pharmacist Choice Alcohol) PADS  Self Yes No   Si (two) times a day Use to test   BD Pen Needle Prabha U/F 32G X 4 MM MISC  Self No No   Sig: USE ONCE DAILY AS DIRECTED BY DOCTOR   Blood Glucose Monitoring Suppl (ONE TOUCH ULTRA 2) w/Device KIT  Self Yes No   Si (two) times a day Use to test   Continuous Blood Gluc  (FREESTYLE RIAZ READER) MAURY  Self No No   Si Units by Does not apply route 4 (four) times a day   DULoxetine (CYMBALTA) 60 mg delayed release capsule  Self No No   Sig: take 1 capsule by mouth twice a day   Docusate Sodium (DSS) 100 MG CAPS  Self Yes No   Sig: Take 100 mg by mouth 2 (two) times a day   Lancet Devices (Easy Mini Eject Lancing Device) MISC  Self Yes No   Si (two) times a day Use to test   Lancets (ONETOUCH ULTRASOFT) lancets Self No No   Sig: Check sugar twice a day   Levemir FlexTouch 100 units/mL injection pen  Self No No   Sig: inject 15 units subcutaneously once daily   OneTouch Ultra test strip  Self No No   Sig: use 1 TEST STRIP to TEST BLOOD SUGAR twice a day   Santyl ointment  Self Yes No   Sig: APPLY TOPICALLY DAILY FOR 10 DAYS  Stiolto Respimat 2 5-2 5 MCG/ACT inhaler  Self Yes No   Sig: INHALE 2 PUFFS INTO THE LUNGS DAILY  albuterol (2 5 mg/3 mL) 0 083 % nebulizer solution  Self Yes No   Sig: Inhale 2 5 mg every 4 (four) hours as needed   albuterol (2 5 mg/3 mL) 0 083 % nebulizer solution  Self Yes No   Sig: USE 1 AMPULE (3ML(2 5 MG TOTAL) BY NEBULIZATION EVERY 4 (FOUR) HOURS AS NEEDED FOR WHEEZING (J44 9)     escitalopram (LEXAPRO) 5 mg tablet  Self Yes No   Sig: Take 5 mg by mouth daily   ferrous sulfate 325 (65 Fe) mg tablet  Self No No   Sig: Take 1 tablet (325 mg total) by mouth daily with breakfast   gabapentin (NEURONTIN) 100 mg capsule  Self No No   Sig: TAKE ONE(1) CAPSULE BY MOUTH THREE TIMES A DAY   insulin glargine (LANTUS) 100 units/mL subcutaneous injection  Self Yes No   Sig: Inject 13 Units under the skin   insulin lispro (HumaLOG) 100 units/mL injection pen  Self Yes No   Sig: Inject 5 Units under the skin 4 (four) times a day   lisinopril (ZESTRIL) 2 5 mg tablet  Self No No   Sig: Take 1 tablet (2 5 mg total) by mouth daily   metFORMIN (GLUCOPHAGE) 1000 MG tablet  Self No No   Sig: TAKE ONE TABLET BY MOUTH TWICE DAILY WITH MEALS   metoprolol tartrate (LOPRESSOR) 25 mg tablet  Self No No   Sig: TAKE 1/2 TABLET (12 5 MG TOTAL) BY MOUTH 2 (TWO) TIMES A DAY   nicotine (NICODERM CQ) 14 mg/24hr TD 24 hr patch  Self Yes No   Sig: Place 1 patch on the skin every 24 hours   nitroglycerin (NITROSTAT) 0 4 mg SL tablet  Self Yes No   Si tab(s)   pantoprazole (PROTONIX) 40 mg tablet   No No   Sig: Take 1 tablet (40 mg total) by mouth 2 (two) times a day   potassium chloride (Klor-Con) 10 mEq tablet  Self Yes No Sig: Take 10 mEq by mouth 2 (two) times a day   simvastatin (ZOCOR) 40 mg tablet  Self No No   Sig: TAKE 1 TABLET (40 MG TOTAL) BY MOUTH DAILY   solifenacin (VESICARE) 5 mg tablet  Self No No   Sig: take 1 tablet by mouth once daily   torsemide (DEMADEX) 20 mg tablet  Self Yes No   Sig: Take 20 mg by mouth daily   traMADol (ULTRAM) 50 mg tablet  Self Yes No   Sig: take 1 tablet by mouth every 6 hours if needed for MODERATE PAIN ( PAIN SCALE 4-6 )      Facility-Administered Medications: None       Past Medical History:   Diagnosis Date    Anxiety     Chronic atrial fibrillation (HCC)     Colonic polyp     COPD (chronic obstructive pulmonary disease) (HCC)     Diabetes mellitus type 2 in nonobese (HCC)     Diverticulosis     large intestine without hemmorhage    Gastric ulcer     Heart failure (HCC)     Hyperlipidemia     Hypertension     Iron deficiency anemia     Major depressive disorder     Peripheral vascular disease (HCC)     T12 compression fracture (HCC) 3/1/2019       Past Surgical History:   Procedure Laterality Date    BREAST SURGERY      CAROTID ENDARTERECTOMY      CORONARY ANGIOPLASTY WITH STENT PLACEMENT      leg and chest    CORONARY ANGIOPLASTY WITH STENT PLACEMENT      adominal stent    CORONARY ARTERY BYPASS GRAFT      triple artery    TONSILLECTOMY      VASCULAR SURGERY         Family History   Problem Relation Age of Onset    Lung cancer Mother     Migraines Mother     Stroke Brother      I have reviewed and agree with the history as documented  E-Cigarette/Vaping    E-Cigarette Use Never User      E-Cigarette/Vaping Substances     Social History     Tobacco Use    Smoking status: Former Smoker    Smokeless tobacco: Never Used   Vaping Use    Vaping Use: Never used   Substance Use Topics    Alcohol use: Yes    Drug use: No       Review of Systems   All other systems reviewed and are negative  Physical Exam  Physical Exam  Vitals reviewed     Constitutional: General: She is not in acute distress  Appearance: Normal appearance  She is not ill-appearing  HENT:      Mouth/Throat:      Mouth: Mucous membranes are moist    Eyes:      Comments: Pale conjunctiva   Cardiovascular:      Rate and Rhythm: Normal rate and regular rhythm  Heart sounds: Normal heart sounds  Pulmonary:      Effort: Pulmonary effort is normal       Breath sounds: Normal breath sounds  Abdominal:      General: Abdomen is flat  Palpations: Abdomen is soft  Tenderness: There is no abdominal tenderness  Musculoskeletal:         General: No swelling  Normal range of motion  Cervical back: Neck supple  Skin:     General: Skin is warm and dry  Neurological:      General: No focal deficit present  Mental Status: She is alert     Psychiatric:         Mood and Affect: Mood normal          Vital Signs  ED Triage Vitals   Temperature Pulse Respirations Blood Pressure SpO2   08/17/21 1255 08/17/21 1114 08/17/21 1114 08/17/21 1114 08/17/21 1114   97 8 °F (36 6 °C) 60 18 140/67 96 %      Temp Source Heart Rate Source Patient Position - Orthostatic VS BP Location FiO2 (%)   08/17/21 1255 08/17/21 1114 08/17/21 1114 08/17/21 1114 --   Oral Monitor Lying Right arm       Pain Score       --                  Vitals:    08/17/21 1808 08/17/21 1828 08/17/21 1830 08/17/21 1845   BP: 115/56 111/56 112/55 111/55   Pulse: 60 60 60 60   Patient Position - Orthostatic VS:             Visual Acuity      ED Medications  Medications   DULoxetine (CYMBALTA) delayed release capsule 60 mg (60 mg Oral Given 8/17/21 1714)   escitalopram (LEXAPRO) tablet 5 mg (5 mg Oral Given 8/17/21 1714)   gabapentin (NEURONTIN) capsule 100 mg (100 mg Oral Given 8/17/21 1717)   pravastatin (PRAVACHOL) tablet 80 mg (80 mg Oral Given 8/17/21 1714)   traMADol (ULTRAM) tablet 50 mg (has no administration in time range)   insulin lispro (HumaLOG) 100 units/mL subcutaneous injection 1-5 Units (1 Units Subcutaneous Not Given 8/17/21 1713)       Diagnostic Studies  Results Reviewed     Procedure Component Value Units Date/Time    Fingerstick Glucose (POCT) [[id number]]  (Normal) Collected: 08/17/21 1712    Lab Status: Final result Updated: 08/17/21 1717     POC Glucose 138 mg/dl     Troponin I [[id number]]  (Abnormal) Collected: 08/17/21 1148    Lab Status: Final result Specimen: Blood from Arm, Right Updated: 08/17/21 1233     Troponin I 0 11 ng/mL     Comprehensive metabolic panel [[id number]]  (Abnormal) Collected: 08/17/21 1148    Lab Status: Final result Specimen: Blood from Arm, Right Updated: 08/17/21 1230     Sodium 135 mmol/L      Potassium 4 4 mmol/L      Chloride 100 mmol/L      CO2 28 mmol/L      ANION GAP 7 mmol/L      BUN 39 mg/dL      Creatinine 0 99 mg/dL      Glucose 270 mg/dL      Calcium 7 9 mg/dL      Corrected Calcium 9 0 mg/dL      AST 16 U/L      ALT 15 U/L      Alkaline Phosphatase 54 U/L      Total Protein 5 8 g/dL      Albumin 2 6 g/dL      Total Bilirubin 0 61 mg/dL      eGFR 53 ml/min/1 73sq m     Narrative:      [place] guidelines for Chronic Kidney Disease (CKD):     Stage 1 with normal or high GFR (GFR > 90 mL/min/1 73 square meters)    Stage 2 Mild CKD (GFR = 60-89 mL/min/1 73 square meters)    Stage 3A Moderate CKD (GFR = 45-59 mL/min/1 73 square meters)    Stage 3B Moderate CKD (GFR = 30-44 mL/min/1 73 square meters)    Stage 4 Severe CKD (GFR = 15-29 mL/min/1 73 square meters)    Stage 5 End Stage CKD (GFR <15 mL/min/1 73 square meters)  Note: GFR calculation is accurate only with a steady state creatinine    Protime-INR [[id number]]  (Normal) Collected: 08/17/21 1148    Lab Status: Final result Specimen: Blood from Arm, Right Updated: 08/17/21 1228     Protime 14 0 seconds      INR 1 13    APTT [[id number]]  (Normal) Collected: 08/17/21 1148    Lab Status: Final result Specimen: Blood from Arm, Right Updated: 08/17/21 1228     PTT 30 seconds     CBC and differential [[id number]]  (Abnormal) Collected: 08/17/21 1148    Lab Status: Final result Specimen: Blood from Arm, Right Updated: 08/17/21 1227     WBC 3 81 Thousand/uL      RBC 1 95 Million/uL      Hemoglobin 6 2 g/dL      Hematocrit 20 0 %       fL      MCH 31 8 pg      MCHC 31 0 g/dL      RDW 18 8 %      MPV 9 6 fL      Platelets 992 Thousands/uL      nRBC 0 /100 WBCs     Manual Differential(PHLEBS Do Not Order) [[id number]]  (Abnormal) Collected: 08/17/21 1148    Lab Status: Final result Specimen: Blood from Arm, Right Updated: 08/17/21 1227     Segmented % 65 %      Bands % 2 %      Lymphocytes % 16 %      Monocytes % 15 %      Eosinophils, % 1 %      Basophils % 0 %      Metamyelocytes% 1 %      Absolute Neutrophils 2 55 Thousand/uL      Lymphocytes Absolute 0 61 Thousand/uL      Monocytes Absolute 0 57 Thousand/uL      Eosinophils Absolute 0 04 Thousand/uL      Basophils Absolute 0 00 Thousand/uL      Total Counted 100     RBC Morphology Normal     Platelet Estimate Adequate                 No orders to display              Procedures  ECG 12 Lead Documentation Only    Date/Time: 8/17/2021 12:07 PM  Performed by: Olga Dooley DO  Authorized by: Olga Dooley DO     ECG reviewed by me, the ED Provider: yes    Patient location:  ED  Previous ECG:     Previous ECG:  Compared to current    Comparison ECG info:  New T-wave inversion in 2, AVF, otherwise unchanged  Interpretation:     Interpretation: abnormal    Rate:     ECG rate assessment: normal    Rhythm:     Rhythm: paced    Pacing:     Capture:  Complete  Ectopy:     Ectopy: none    QRS:     QRS axis:  Normal    QRS intervals:   Wide  Conduction:     Conduction: normal    ST segments:     ST segments:  Normal  T waves:     T waves: inverted      Inverted:  II, aVF, III, V5 and V6    CriticalCare Time  Performed by: Olga Dooley DO  Authorized by: Olga Dooley DO     Critical care provider statement:     Critical care time (minutes):  30    Critical care was necessary to treat or prevent imminent or life-threatening deterioration of the following conditions:  Shock    Critical care was time spent personally by me on the following activities:  Obtaining history from patient or surrogate, development of treatment plan with patient or surrogate, evaluation of patient's response to treatment, examination of patient, interpretation of cardiac output measurements, ordering and performing treatments and interventions, ordering and review of laboratory studies, re-evaluation of patient's condition and review of old charts             ED Course  ED Course as of Aug 17 1924   Tue Aug 17, 2021   1227 Hemoglobin 6 2, will transfuse  0484 31 29 02 Patient with elevated troponin and new T-wave inversions, however appears chronically elevated, will discuss disposition with patient though I feel that admission for troponin trending in the setting of new T-wave inversions and workup for GI bleed is more appropriate  1255 Patient with guaiac-positive stool, have discussed with family and patient who are in agreement with admission  SBIRT 22yo+      Most Recent Value   SBIRT (24 yo +)   In order to provide better care to our patients, we are screening all of our patients for alcohol and drug use  Would it be okay to ask you these screening questions? Unable to answer at this time Filed at: 08/17/2021 1159                    MDM  Number of Diagnoses or Management Options  Diagnosis management comments: Patient is an 62-year-old female past medical history diabetes, hypertension, hyperlipidemia, atrial fibrillation, CHF, peptic ulcer disease, depression, GI bleed, anemia presenting with low hemoglobin  Patient is well-appearing at bedside with stable vitals and in no acute distress  She has no abdominal tenderness, pale conjunctiva but no other significant physical exam findings as mentating appropriately    Will recheck CBC as well as coagulation studies, type and screen, CMP and troponin in the setting of multiple other comorbidities transfuse as needed  Patient has heme positive stools will recommend admission for further management however if negative will discuss disposition with family and patient  Disposition  Final diagnoses:   Anemia   GI bleed     Time reflects when diagnosis was documented in both MDM as applicable and the Disposition within this note     Time User Action Codes Description Comment    8/17/2021  1:27 PM Eduardo Gonzalez Add [D64 9] Anemia     8/17/2021  1:27 PM Eduardo Gonzalez Add [K92 2] GI bleed       ED Disposition     ED Disposition Condition Date/Time Comment    Admit Stable Tue Aug 17, 2021  1:27 PM Case was discussed with Dr Francisco Javier Hawkins and the patient's admission status was agreed to be Admission Status: observation status to the service of Dr Francisco Javier Hawkins   Follow-up Information    None         Patient's Medications   Discharge Prescriptions    No medications on file     No discharge procedures on file      PDMP Review     None          ED Provider  Electronically Signed by           Octavia Ballesteros DO  08/17/21 8732

## 2021-08-17 NOTE — ASSESSMENT & PLAN NOTE
Present on admission history of CAD, history of CABG  Currently not on aspirin due to GI bleed  Continue Lopressor, statin    Hold antihypertensives for now

## 2021-08-17 NOTE — Clinical Note
Case was discussed with Dr Marie Price and the patient's admission status was agreed to be Admission Status: observation status to the service of Dr Marie Price

## 2021-08-17 NOTE — H&P
[facility]  H&P- Altagracia Avila 1940, 79 y o  female MRN: [id number]  Unit/Bed#: ED 15 Encounter: [id number]  Primary Care Provider: Hanny Ross MD   Date and time admitted to hospital: 8/17/2021 10:59 AM    * Acute on chronic blood loss anemia  Assessment & Plan  79year old female with recent hospitalization due to GI bleed had normal EGD and colonoscopy on 08/13/21 and plan was outpatient capsule endoscopy and patient was discharged back to [facility]  Again presented to [facility] Emergency room with anemia of 6 6  ER requested and ordered 2 units of PRBC transfusion  Currently on my encounter patient is hemodynamically stable  Clear nontoxic appearing  Denies melena, hematuria, hematemesis, any other active overt bleeding noted  Plan is to monitor CBC and transfuse to keep hemoglobin more than 8  NPO for now  Follow-up with GI consult  Continue with supportive care  Patient's significant other present at bedside  Elevated troponin  Assessment & Plan  Noted down trending from previous admissions  Currently patient is chest pain-free  CAD (coronary artery disease) s/p CABG  Assessment & Plan  Present on admission history of CAD, history of CABG  Currently not on aspirin due to GI bleed  Continue Lopressor, statin  Hold antihypertensives for now    Chronic atrial fibrillation [facility])  Assessment & Plan  Present admission history of chronic AFib  Not on anticoagulation  Hold beta-blocker dressings of acute on chronic blood loss, soft BP at times  Essential hypertension  Assessment & Plan  Hold antihypertensive agents  Diabetes mellitus type 2, uncontrolled ([address] )  Assessment & Plan  Lab Results   Component Value Date    HGBA1C 5 9 (H) 04/26/2021       Recent Labs     08/14/21  1626   POCGLU 70       Blood Sugar Average: Last 72 hrs:    Present on admission history of diabetes  Patient reports that she takes 13-14 use of Lantus b i d    Currently Accu-Cheks in 200s  Will hold long-acting insulin for now since patient is going to be NPO  Continue Accu-Cheks monitoring every 4 hours and adjust insulin as needed  VTE Pharmacologic Prophylaxis: VTE Score: 3 Low Risk (Score 0-2) - Encourage Ambulation  Code Status: Level 3 - DNAR and DNI   Discussion with family: Updated  (significant other) at bedside  Anticipated Length of Stay: Patient will be admitted on an observation basis with an anticipated length of stay of less than 2 midnights secondary to Anemia  Total Time for Visit, including Counseling / Coordination of Care: 60 minutes Greater than 50% of this total time spent on direct patient counseling and coordination of care  Chief Complaint:  Anemia    History of Present Illness:  Cely Nguyễn is a 79 y o  female with a PMH of AFib not on anticoagulation at present, COPD, history of CAD, CABG, pacemaker, mesenteric artery stenosis, diabetes, anxiety, CHF, hyperlipidemia, hypertension, peripheral vascular disease, recent hospitalization due to GI bleed and had normal EGD and colonoscopy on 08/13/2021 and patient was discharged back to SNF with plan to follow-up outpatient for capsule endoscopy who presents with anemia  Patient was sent back to [address] [place] [address] Emergency room due to found to be anemic with hemoglobin 6 6 on routine labs  Currently patient is getting 1/2 unit PRBC transfusion on my encounter  She is awake, alert, oriented and stable at her baseline  She denies chest pain, dyspnea, fever, chills, nausea vomiting, abdominal pain, dysuria, diarrhea, hematuria, melena, hematochezia, any new complaints  No other events reported  Level 3 DNR DNI  Review of Systems:  Review of Systems   Constitutional: Negative for chills, diaphoresis, fatigue and fever  HENT: Negative for congestion  Eyes: Negative for photophobia and visual disturbance  Respiratory: Negative for cough, choking and shortness of breath  Gastrointestinal: Negative for abdominal distention, abdominal pain, diarrhea, nausea and vomiting  Endocrine: Negative for polyuria  Genitourinary: Negative for dysuria and pelvic pain  Skin: Negative for color change  Neurological: Negative for seizures  Psychiatric/Behavioral: Negative for agitation  Past Medical and Surgical History:   Past Medical History:   Diagnosis Date    Anxiety     Chronic atrial fibrillation (HCC)     Colonic polyp     COPD (chronic obstructive pulmonary disease) ([facility] )     Diabetes mellitus type 2 in nonobese (HCC)     Diverticulosis     large intestine without hemmorhage    Gastric ulcer     Heart failure (HCC)     Hyperlipidemia     Hypertension     Iron deficiency anemia     Major depressive disorder     Peripheral vascular disease (HCC)     T12 compression fracture ([facility] ) 3/1/2019       Past Surgical History:   Procedure Laterality Date    BREAST SURGERY      CAROTID ENDARTERECTOMY      CORONARY ANGIOPLASTY WITH STENT PLACEMENT      leg and chest    CORONARY ANGIOPLASTY WITH STENT PLACEMENT      adominal stent    CORONARY ARTERY BYPASS GRAFT      triple artery    TONSILLECTOMY      VASCULAR SURGERY         Meds/Allergies:  Prior to Admission medications    Medication Sig Start Date End Date Taking? Authorizing Provider   albuterol (2 5 mg/3 mL) 0 083 % nebulizer solution Inhale 2 5 mg every 4 (four) hours as needed 5/20/21   Historical Provider, MD   albuterol (2 5 mg/3 mL) 0 083 % nebulizer solution USE 1 AMPULE (3ML(2 5 MG TOTAL) BY NEBULIZATION EVERY 4 (FOUR) HOURS AS NEEDED FOR WHEEZING (J44 9)   7/6/21   Historical Provider, MD   Alcohol Swabs (Pharmacist Choice Alcohol) PADS 2 (two) times a day Use to test 6/9/21   Historical Provider, MD   BD Pen Needle Prabha U/F 32G X 4 MM MISC USE ONCE DAILY AS DIRECTED BY DOCTOR 10/21/20   Jes Ovalles MD   Blood Glucose Monitoring Suppl (ONE TOUCH ULTRA 2) w/Device KIT 2 (two) times a day Use to test 6/9/21   Historical Provider, MD   Continuous Blood Gluc  (FREESTYLE RIAZ READER) MAURY 1 Units by Does not apply route 4 (four) times a day 12/17/18   Jhonatan Katz MD   Docusate Sodium (DSS) 100 MG CAPS Take 100 mg by mouth 2 (two) times a day 6/18/21 6/18/22  Historical Provider, MD   DULoxetine (CYMBALTA) 60 mg delayed release capsule take 1 capsule by mouth twice a day 7/26/21   Stephanie Jerez MD   escitalopram (LEXAPRO) 5 mg tablet Take 5 mg by mouth daily 6/19/21 6/19/22  Historical Provider, MD   ferrous sulfate 325 (65 Fe) mg tablet Take 1 tablet (325 mg total) by mouth daily with breakfast 5/13/21   Jhonatan Katz MD   gabapentin (NEURONTIN) 100 mg capsule TAKE ONE(1) CAPSULE BY MOUTH THREE TIMES A DAY 6/1/21   Sosa Barreto PA-C   insulin glargine (LANTUS) 100 units/mL subcutaneous injection Inject 13 Units under the skin 6/18/21   Historical Provider, MD   insulin lispro (HumaLOG) 100 units/mL injection pen Inject 5 Units under the skin 4 (four) times a day    Historical Provider, MD   Lancet Devices (Easy Mini Eject Lancing Device) MISC 2 (two) times a day Use to test 6/9/21   Historical Provider, MD   Lancets Sherdeja Juniper ULTRASOFT) lancets Check sugar twice a day 2/22/19   Jhonatan Katz MD   Levemir FlexTouch 100 units/mL injection pen inject 15 units subcutaneously once daily 4/26/21   Jhonatan Katz MD   lisinopril (ZESTRIL) 2 5 mg tablet Take 1 tablet (2 5 mg total) by mouth daily 5/13/21   Jhonatan Katz MD   metFORMIN (GLUCOPHAGE) 1000 MG tablet TAKE ONE TABLET BY MOUTH TWICE DAILY WITH MEALS 6/1/21   Jhonatan Katz MD   metoprolol tartrate (LOPRESSOR) 25 mg tablet TAKE 1/2 TABLET (12 5 MG TOTAL) BY MOUTH 2 (TWO) TIMES A DAY 6/28/21   Jhonatan Katz MD   nicotine (NICODERM CQ) 14 mg/24hr TD 24 hr patch Place 1 patch on the skin every 24 hours    Historical Provider, MD   nitroglycerin (NITROSTAT) 0 4 mg SL tablet 1 tab(s)    Historical Provider, MD   OneTouch Ultra test strip use 1 TEST STRIP to TEST BLOOD SUGAR twice a day 2/24/21   Brian oGode MD   pantoprazole (PROTONIX) 40 mg tablet Take 1 tablet (40 mg total) by mouth 2 (two) times a day 8/14/21   Heath Oliver MD   potassium chloride (Klor-Con) 10 mEq tablet Take 10 mEq by mouth 2 (two) times a day 7/2/21   Historical Provider, MD   Santyl ointment APPLY TOPICALLY DAILY FOR 10 DAYS  6/18/21   Historical Provider, MD   simvastatin (ZOCOR) 40 mg tablet TAKE 1 TABLET (40 MG TOTAL) BY MOUTH DAILY 6/28/21   Brian Goode MD   solifenacin (VESICARE) 5 mg tablet take 1 tablet by mouth once daily 1/26/21   Brian Goode MD   Stiolto Respimat 2 5-2 5 MCG/ACT inhaler INHALE 2 PUFFS INTO THE LUNGS DAILY  6/12/21   Historical Provider, MD   torsemide (DEMADEX) 20 mg tablet Take 20 mg by mouth daily 6/19/21 6/19/22  Historical Provider, MD   traMADol (ULTRAM) 50 mg tablet take 1 tablet by mouth every 6 hours if needed for MODERATE PAIN ( PAIN SCALE 4-6 ) 5/3/21   Historical Provider, MD     I have reviewed home medications with a medical source (PCP, Pharmacy, other)  Allergies: Allergies   Allergen Reactions    Calcipotriene Hives    Lactose - Food Allergy GI Intolerance    Penicillin V Hives     Category: Allergy;     Propoxyphene Rash       Social History:  Marital Status:     Patient Pre-hospital Living Situation: [facility]  Patient Pre-hospital Level of Mobility: unable to be assessed at time of evaluation  Patient Pre-hospital Diet Restrictions:  None reported  Substance Use History:   Social History     Substance and Sexual Activity   Alcohol Use Yes     Social History     Tobacco Use   Smoking Status Former Smoker   Smokeless Tobacco Never Used     Social History     Substance and Sexual Activity   Drug Use No       Family History:  Family History   Problem Relation Age of Onset    Lung cancer Mother     Migraines Mother     Stroke Brother        Physical Exam:     Vitals:   Blood Pressure: 127/60 (08/17/21 1515)  Pulse: 60 (08/17/21 1515)  Temperature: 98 7 °F (37 1 °C) (08/17/21 1540)  Temp Source: Oral (08/17/21 1540)  Respirations: 20 (08/17/21 1515)  Weight - Scale: 56 9 kg (125 lb 7 1 oz) (08/17/21 1114)  SpO2: 97 % (08/17/21 1515)    Physical Exam  Constitutional:       General: She is not in acute distress  Appearance: Normal appearance  She is not ill-appearing  Comments: Elderly female patient, chronically ill-appearing, acutely nontoxic appearing  HENT:      Head: Normocephalic and atraumatic  Eyes:      Pupils: Pupils are equal, round, and reactive to light  Cardiovascular:      Rate and Rhythm: Normal rate and regular rhythm  Pulses: Normal pulses  Heart sounds: Normal heart sounds  Pulmonary:      Effort: Pulmonary effort is normal  No respiratory distress  Breath sounds: Normal breath sounds  No stridor  Abdominal:      General: Bowel sounds are normal  There is no distension  Palpations: Abdomen is soft  Tenderness: There is no abdominal tenderness  Musculoskeletal:      Cervical back: Normal range of motion and neck supple  Skin:     Coloration: Skin is pale  Neurological:      General: No focal deficit present  Mental Status: She is alert and oriented to person, place, and time  Mental status is at baseline     Psychiatric:         Behavior: Behavior normal        Additional Data:     Lab Results:  Results from last 7 days   Lab Units 08/17/21  1148   WBC Thousand/uL 3 81*   HEMOGLOBIN g/dL 6 2*   HEMATOCRIT % 20 0*   PLATELETS Thousands/uL 130*   BANDS PCT % 2   LYMPHO PCT % 16   MONO PCT % 15*   EOS PCT % 1     Results from last 7 days   Lab Units 08/17/21  1148   SODIUM mmol/L 135*   POTASSIUM mmol/L 4 4   CHLORIDE mmol/L 100   CO2 mmol/L 28   BUN mg/dL 39*   CREATININE mg/dL 0 99   ANION GAP mmol/L 7   CALCIUM mg/dL 7 9*   ALBUMIN g/dL 2 6*   TOTAL BILIRUBIN mg/dL 0 61   ALK PHOS U/L 54   ALT U/L 15   AST U/L 16   GLUCOSE RANDOM mg/dL 270*     Results from last 7 days Lab Units 08/17/21  1148   INR  1 13     Results from last 7 days   Lab Units 08/14/21  1626 08/14/21  1047 08/14/21  0544 08/13/21  2133 08/13/21  1613 08/13/21  1050 08/13/21  0542 08/12/21  1730 08/12/21  0852   POC GLUCOSE mg/dl 70 199* 184* 177* 175* 214* 240* 235* 125         Results from last 7 days   Lab Units 08/12/21  0738   LACTIC ACID mmol/L 1 6       EKG and Other Studies Reviewed on Admission:   · EKG:  Not available if done    ** Please Note: This note has been constructed using a voice recognition system   **

## 2021-08-17 NOTE — ASSESSMENT & PLAN NOTE
79year old female with recent hospitalization due to GI bleed had normal EGD and colonoscopy on 08/13/21 and plan was outpatient capsule endoscopy and patient was discharged back to SNF  Again presented to [address] [place] [address] Emergency room with anemia of 6 6  ER requested and ordered 2 units of PRBC transfusion  Currently on my encounter patient is hemodynamically stable  Clear nontoxic appearing  Denies melena, hematuria, hematemesis, any other active overt bleeding noted  Plan is to monitor CBC and transfuse to keep hemoglobin more than 8  NPO for now  Follow-up with GI consult  Continue with supportive care  Patient's significant other present at bedside

## 2021-08-17 NOTE — ASSESSMENT & PLAN NOTE
Present admission history of chronic AFib  Not on anticoagulation  Hold beta-blocker dressings of acute on chronic blood loss, soft BP at times

## 2021-08-17 NOTE — ASSESSMENT & PLAN NOTE
Lab Results   Component Value Date    HGBA1C 5 9 (H) 04/26/2021       Recent Labs     08/14/21  1626   POCGLU 70       Blood Sugar Average: Last 72 hrs:    Present on admission history of diabetes  Patient reports that she takes 13-14 use of Lantus b i d  Currently Accu-Cheks in 200s  Will hold long-acting insulin for now since patient is going to be NPO  Continue Accu-Cheks monitoring every 4 hours and adjust insulin as needed

## 2021-08-17 NOTE — ED NOTES
Pt is aaox4, resps unlabored, skin warm/dry, nsr on cm   no complaints at this time  Blood infusing via iv RAC site patent  Pending room assignment        Milton Nevarez RN  08/17/21 5553

## 2021-08-17 NOTE — TELEPHONE ENCOUNTER
Spoke with Tonya Bryan at [facility] and advised her the patient should be sent to ER for blood transfusion  She understood and will do so immediately

## 2021-08-17 NOTE — CASE MANAGEMENT
CM responded to Code R alert      RECENT ADMISSION: 8/12/2021 - 8/14/2021 (2 days)  [address] [facility]    AGE:81  SEX: female  DX: acute blood loss anemia  OUTCOME: Discharged to [facility]  RESOURCES ON D/C (previous admission):   Patient has GG insurance  Patient has a PCP Yoana Potter  Patient is currently in STR at [facility]    CURRENT F/U APPTS:   None: patient discharged on Saturday    REASON FOR READMISSION:   Was scheduled for a pill cam today, HGB 6 7 GI sent to ED for blood for transfusion  INTERVENTIONS: patient admitted under OBS

## 2021-08-18 NOTE — TELEPHONE ENCOUNTER
Mimi Browne patient - called to confirm pill cam on Thursday 8/19, patient is admitted but  did not want me to cancel  appt as of yet   wanted me to be sure to let Dr Mimi Browne know patient was in ED and had been tranfused   Thx

## 2021-08-18 NOTE — CASE MANAGEMENT
Case Management Assessment & Discharge Planning Note    Patient name Nita Garcia  Location ED 15/ED 13 MRN [id number]  : 1940 Date 2021       Current Admission Date: 2021  Current Admission Diagnosis:  Acute on chronic blood loss anemia   Patient Active Problem List   Diagnosis    Allergic rhinitis    Atherosclerosis of native artery of both lower extremities with intermittent claudication (HCC)    Carotid stenosis, bilateral    Carpal tunnel syndrome on both sides    Depression    Diabetes mellitus type 2, uncontrolled ([facility]    Diabetic peripheral neuropathy ([facility] )    Hyperlipidemia    Essential hypertension    Mesenteric artery stenosis (HCC)    Seborrheic keratosis    Skin lesion    Vitamin D deficiency    Cerebral infarction ([facility]    Peripheral neuropathy    Trigger little finger of right hand    Urinary incontinence in female    Vertigo    Degenerative joint disease (DJD) of lumbar spine    Pain of right hip joint    Gastrointestinal hemorrhage associated with gastric ulcer    Acute on chronic blood loss anemia    Chronic atrial fibrillation (HCC)    CAD (coronary artery disease) s/p CABG    Pacemaker    Elevated troponin    Abnormal CT of the chest    Previous Admission - Discharge Date:21   LOS (days): 0  Geometric Mean LOS (GMLOS) (days):   Days to GMLOS: Previous Discharge Diagnosis:  There are no discharge diagnoses documented for the most recent discharge  Risk of Unplanned Readmission Score  Predictive Model Details   No score data available for Risk of Unplanned Readmission        BUNDLE: Bundled Patient Payment:  (no)    Reason for Referral:    OBJECTIVE:  Pt is a 79y o  year old , white or  [1], female with Sabianist preference of [address] admitted on  10:59 AM  Pt is admitted to ED 15 at [address] [place] [address] with complaints of Acute on chronic blood loss anemia     Current admission status: Observation  Referral Reason: Rehab (return to [facility])    Preferred Pharmacy:   [facility] [address] [facility], [address]  [address] [facility]  [address] [facility]  Phone: [phone] Fax: [phone]    Primary Care Provider: Macarena Sharp MD    Primary Insurance: [facility] REP  Secondary Insurance: [address]    ASSESSMENT:  [address] [facility]   Primary Phone: [phone] (Home)               Advance Directives  Does patient have a [address]?: Yes  Does patient have Advance Directives?: Yes  Advance Directives: Living will, Power of  for health care, Power of  for finance    Obs Notice Signed: 08/18/21    Tiffany Dupont [address] of Residence: [place]    Readmission Root Cause  30 Day Readmission: Yes  Who directed you to return to the hospital?: Other (comment) (SNF medical director)  Did you understand whom to contact if you had questions or problems?: Yes  Did you get your prescriptions before you left the hospital?: No  Reason[de-identified] Not preferred pharmacy (SNF)  Were you able to get your prescriptions filled when you left the hospital?: Yes  Did you take your medications as prescribed?: Yes  Were you able to get to your follow-up appointments?: Yes  Patient was readmitted due to: rectal bleed, hgb dropped > 1 gram  Action Plan: obv and transfuse    Patient Information  Mental Status: Alert  During Assessment patient was accompanied by: Friend  Assessment information provided by[de-identified] Patient, Son, Friend  Primary Caregiver: Child  Caregiver's Name[de-identified] Libia Garcia Relationship to Patient[de-identified] Family Member  Caregiver's Telephone Number[de-identified] [phone]  Support Systems: Self, Son, Friend  What city do you live in?: [address] entry access options   Select all that apply : Other access (Comment) (SNF)  Type of Current Residence: Facility  Upon entering residence, is there a bedroom on the main floor (no further steps)?: Yes  Upon entering residence, is there a bathroom on the main floor (no further steps)?: Yes  Living Arrangements: Other (Comment) ([facility])  Is patient a ?: No    Activities of Daily Living Prior to Admission  Functional Status: Assistance  Completes ADLs independently?: No  Level of ADL dependence: Total Dependent  Ambulates independently?: No  Level of ambulatory dependence:  Total Dependent  Does patient use assisted devices?: Yes  Assisted Devices (DME) used: Audery [place], Wheelchair, Hospital Bed  Does patient currently own DME?: Yes  What DME does the patient currently own?: [employer] [address]  Does patient have a history of Outpatient Therapy (PT/OT)?: No  Does the patient have a history of Short-Term Rehab?: Yes (current at [facility])  Does patient currently have [facility]?: No         Patient Information Continued  Income Source: Pension/shelter  Does patient have prescription coverage?: Yes  Does patient receive dialysis treatments?: No  Does patient have a history of substance abuse?: No  Does patient have a history of Mental Health Diagnosis?: No         Means of Transportation  Means of Transport to Appts[de-identified] Family transport  In the past 12 months, has lack of transportation kept you from medical appointments or from getting medications?: No  In the past 12 months, has lack of transportation kept you from meetings, work, or from getting things needed for daily living?: No  Was application for public transport provided?: No (NA)    DISCHARGE DETAILS:    Discharge planning discussed with[de-identified] patient friend and son Tony Whittington by phone  Freedom of Choice: Yes    Contacts  Patient Contacts: raven Sutton to Patient[de-identified] Family  Contact Method: Phone  Phone Number: [phone]  Reason/Outcome: Discharge [address]         Is the patient interested in [facility] at discharge?: No    DME Referral Provided  Referral made for DME?: No    Other Referral/Resources/Interventions Provided:  Referrals Provided[de-identified] Short Term Rehab (PVM confirmed to return by [facility] admissions and CM called and spoke to [facility]  nursing aware of ongoing bowel prep)         Discharge Destination Plan[de-identified] Short Term Rehab  Transportation at Discharge?: Yes  Type of Transport: S Ambulance  Dispatcher Called: Yes  Number/Name of Dispatcher: [phone]  Transported by [employer] and Unit #):  [facility] (requested next available transport and submitted auth to [employer] via fax)                            Accepting Facility Name, [facility] State : [place] [address]  Receiving Facility/Agency Phone Number: [phone]  Facility/Agency Fax Number: [phone]

## 2021-08-18 NOTE — PLAN OF CARE
Problem: Potential for Falls  Goal: Patient will remain free of falls  Description: INTERVENTIONS:  - Educate patient/family on patient safety including physical limitations  - Instruct patient to call for assistance with activity   - Consult OT/PT to assist with strengthening/mobility   - Keep Call bell within reach  - Keep bed low and locked with side rails adjusted as appropriate  - Keep care items and personal belongings within reach  - Initiate and maintain comfort rounds  - Make Fall Risk Sign visible to staff  - Offer Toileting every  Hours, in advance of need  - Initiate/Maintain alarm  - Obtain necessary fall risk management equipment:   - Apply yellow socks and bracelet for high fall risk patients  - Consider moving patient to room near nurses station  Outcome: Progressing     Problem: PAIN - ADULT  Goal: Verbalizes/displays adequate comfort level or baseline comfort level  Description: Interventions:  - Encourage patient to monitor pain and request assistance  - Assess pain using appropriate pain scale  - Administer analgesics based on type and severity of pain and evaluate response  - Implement non-pharmacological measures as appropriate and evaluate response  - Consider cultural and social influences on pain and pain management  - Notify physician/advanced practitioner if interventions unsuccessful or patient reports new pain  Outcome: Progressing     Problem: INFECTION - ADULT  Goal: Absence or prevention of progression during hospitalization  Description: INTERVENTIONS:  - Assess and monitor for signs and symptoms of infection  - Monitor lab/diagnostic results  - Monitor all insertion sites, i e  indwelling lines, tubes, and drains  - Monitor endotracheal if appropriate and nasal secretions for changes in amount and color  - Antoine appropriate cooling/warming therapies per order  - Administer medications as ordered  - Instruct and encourage patient and family to use good hand hygiene technique  - Identify and instruct in appropriate isolation precautions for identified infection/condition  Outcome: Progressing  Goal: Absence of fever/infection during neutropenic period  Description: INTERVENTIONS:  - Monitor WBC    Outcome: Progressing     Problem: SAFETY ADULT  Goal: Patient will remain free of falls  Description: INTERVENTIONS:  - Educate patient/family on patient safety including physical limitations  - Instruct patient to call for assistance with activity   - Consult OT/PT to assist with strengthening/mobility   - Keep Call bell within reach  - Keep bed low and locked with side rails adjusted as appropriate  - Keep care items and personal belongings within reach  - Initiate and maintain comfort rounds  - Make Fall Risk Sign visible to staff  - Offer Toileting every Hours, in advance of need  - Initiate/Maintain alarm  - Obtain necessary fall risk management equipment:   - Apply yellow socks and bracelet for high fall risk patients  - Consider moving patient to room near nurses station  Outcome: Progressing  Goal: Maintain or return to baseline ADL function  Description: INTERVENTIONS:  -  Assess patient's ability to carry out ADLs; assess patient's baseline for ADL function and identify physical deficits which impact ability to perform ADLs (bathing, care of mouth/teeth, toileting, grooming, dressing, etc )  - Assess/evaluate cause of self-care deficits   - Assess range of motion  - Assess patient's mobility; develop plan if impaired  - Assess patient's need for assistive devices and provide as appropriate  - Encourage maximum independence but intervene and supervise when necessary  - Involve family in performance of ADLs  - Assess for home care needs following discharge   - Consider OT consult to assist with ADL evaluation and planning for discharge  - Provide patient education as appropriate  Outcome: Progressing  Goal: Maintains/Returns to pre admission functional level  Description: INTERVENTIONS:  - Perform BMAT or MOVE assessment daily    - Set and communicate daily mobility goal to care team and patient/family/caregiver  - Collaborate with rehabilitation services on mobility goals if consulted  - Perform Range of Motion times a day  - Reposition patient every  hours  - Dangle patient  times a day  - Stand patient times a day  - Ambulate patient times a day  - Out of bed to chair  times a day   - Out of bed for meals times a day  - Out of bed for toileting  - Record patient progress and toleration of activity level   Outcome: Progressing     Problem: DISCHARGE PLANNING  Goal: Discharge to home or other facility with appropriate resources  Description: INTERVENTIONS:  - Identify barriers to discharge w/patient and caregiver  - Arrange for needed discharge resources and transportation as appropriate  - Identify discharge learning needs (meds, wound care, etc )  - Arrange for interpretive services to assist at discharge as needed  - Refer to Case Management Department for coordinating discharge planning if the patient needs post-hospital services based on physician/advanced practitioner order or complex needs related to functional status, cognitive ability, or social support system  Outcome: Progressing     Problem: Knowledge Deficit  Goal: Patient/family/caregiver demonstrates understanding of disease process, treatment plan, medications, and discharge instructions  Description: Complete learning assessment and assess knowledge base    Interventions:  - Provide teaching at level of understanding  - Provide teaching via preferred learning methods  Outcome: Progressing     Problem: GASTROINTESTINAL - ADULT  Goal: Minimal or absence of nausea and/or vomiting  Description: INTERVENTIONS:  - Administer IV fluids if ordered to ensure adequate hydration  - Maintain NPO status until nausea and vomiting are resolved  - Nasogastric tube if ordered  - Administer ordered antiemetic medications as needed  - Provide nonpharmacologic comfort measures as appropriate  - Advance diet as tolerated, if ordered  - Consider nutrition services referral to assist patient with adequate nutrition and appropriate food choices  Outcome: Progressing     Problem: HEMATOLOGIC - ADULT  Goal: Maintains hematologic stability  Description: INTERVENTIONS  - Assess for signs and symptoms of bleeding or hemorrhage  - Monitor labs  - Administer supportive blood products/factors as ordered and appropriate  Outcome: Progressing

## 2021-08-18 NOTE — ED NOTES
Pt sleeping at this time with resps unlabored  Skin warm/dry   nsr on cm     Valencia Sigala RN  08/18/21 5736

## 2021-08-18 NOTE — ASSESSMENT & PLAN NOTE
79year old female with recent hospitalization due to GI bleed had normal EGD and colonoscopy on 08/13/21 and plan was outpatient capsule endoscopy and patient was discharged back to SNF  Again presented to [facility] Emergency room with anemia of 6 6  ER requested and ordered 2 units of PRBC transfusion  Currently on my encounter patient is hemodynamically stable  Clear nontoxic appearing  Denies melena, hematuria, hematemesis, any other active overt bleeding noted  Plan is to monitor CBC and transfuse to keep hemoglobin more than 8  NPO for now  Follow-up with GI consult  Continue with supportive care  Patient's significant other present at bedside

## 2021-08-18 NOTE — TRANSPORTATION MEDICAL NECESSITY
Section I - General Information    Name of Patient: Odette Weldon                 : 1940    Medicare #: [id number]  Transport Date: 21 (PCS is valid for round trips on this date and for all repetitive trips in the 60-day range as noted below )  Origin: [address] [place] [address] Road: [facility]  Is the pt's stay covered under Medicare Part A (PPS/DRG)   [x]     Closest appropriate facility? If no, why is transport to more distant facility required? Yes  If hospice pt, is this transport related to pt's terminal illness? NA       Section II - Medical Necessity Questionnaire  Ambulance transportation is medically necessary only if other means of transport are contraindicated or would be potentially harmful to the patient  To meet this requirement, the patient must either be "bed confined" or suffer from a condition such that transport by means other than ambulance is contraindicated by the patient's condition  The following questions must be answered by the medical professional signing below for this form to be valid:    1)  Describe the MEDICAL CONDITION (physical and/or mental) of this patient AT [address] [place] [address] that requires the patient to be transported in an ambulance and why transport by other means is contraindicated by the patient's condition: blood loss anemia, CAD, increased Trops, Afib, carotid stenosis with CVA    2) Is the patient "bed confined" as defined below? Yes  To be "be confined" the patient must satisfy all three of the following conditions: (1) unable to get up from bed without Assistance; AND (2) unable to ambulate; AND (3) unable to sit in a chair or wheelchair  3) Can this patient safely be transported by car or wheelchair van (i e , seated during transport without a medical attendant or monitoring)?    No    4) In addition to completing questions 1-3 above, please check any of the following conditions that apply*:   *Note: supporting documentation for any boxes checked must be maintained in the patient's medical records  If hosp-hosp transfer, describe services needed at 2nd facility not available at 1st facility? Moderate/severe pain on movement   Unable to tolerate seated position for time needed to transport       Section III - Signature of Physician or Healthcare Professional  I certify that the above information is true and correct based on my evaluation of this patient, and represent that the patient requires transport by ambulance and that other forms of transport are contraindicated  I understand that this information will be used by the Centers for Medicare and Medicaid Services (CMS) to support the determination of medical necessity for ambulance services, and I represent that I have personal knowledge of the patient's condition at time of transport  [x]  If this box is checked, I also certify that the patient is physically or mentally incapable of signing the ambulance service's claim and that the institution with which I am affiliated has furnished care, services, or assistance to the patient  My signature below is made on behalf of the patient pursuant to 42 CFR §424 36(b)(4)  In accordance with 42 CFR §424 37, the specific reason(s) that the patient is physically or mentally incapable of signing the claim form is as follows:NA      Signature of Physician* or Healthcare Professional______________________________________________________________  Signature Date 08/18/21 (For scheduled repetitive transports, this form is not valid for transports performed more than 60 days after this date)    Printed Name & Credentials of Physician or Healthcare Professional (MD, DO, RN, etc )________________Anai Kay RN BSN________________  *Form must be signed by patient's attending physician for scheduled, repetitive transports   For non-repetitive, unscheduled ambulance transports, if unable to obtain the signature of the attending physician, any of the following may sign (choose appropriate option below)  [] Physician Assistant []  Clinical Nurse Specialist [x]  Registered Nurse  []  Nurse Practitioner  [] Discharge Planner

## 2021-08-18 NOTE — UTILIZATION REVIEW
Initial Clinical Review    Admission: Date/Time/Statement:   Admission Orders (From admission, onward)     Ordered        08/17/21 1327  Place in Observation  Once                   Orders Placed This Encounter   Procedures    Place in Observation     Standing Status:   Standing     Number of Occurrences:   1     Order Specific Question:   Level of Care     Answer:   Med Surg [16]     ED Arrival Information     Expected Arrival Acuity    - 8/17/2021 10:58 Emergent         Means of arrival Escorted by Service Admission type    Ambulance [facility]  [facility] Hospitalist Emergency         Arrival complaint    ABNORMAL LABS        Chief Complaint   Patient presents with    Abnormal Lab     Patient hemoglobin 6 7  Per patient family member at bedside, "the doctor thinks shes loosing blood from lower intestine and she was just in here, left with a hemoglobin of 8  "        Initial Presentation: 78 yo female to ED from home w/ hgb 6 6 on routine labs   recent hospitalization due to GI bleed and had normal EGD and colonoscopy on 08/13/2021 and patient was discharged back to SNF with plan to follow-up outpatient for capsule endoscopy   Admitted OBS status plan to monitor cbc , transfuse to keep hgb >8 , NPO , GI consult , supportive care   Elevated trop down trending from previous admission , pt pain free  CAD cont lopressor , statin   DM pt NPO , monitor accu checks q4hr        ED Triage Vitals   Temperature Pulse Respirations Blood Pressure SpO2   08/17/21 1255 08/17/21 1114 08/17/21 1114 08/17/21 1114 08/17/21 1114   97 8 °F (36 6 °C) 60 18 140/67 96 %      Temp Source Heart Rate Source Patient Position - Orthostatic VS BP Location FiO2 (%)   08/17/21 1255 08/17/21 1114 08/17/21 1114 08/17/21 1114 --   Oral Monitor Lying Right arm       Pain Score       --                 Wt Readings from Last 1 Encounters:   08/17/21 56 9 kg (125 lb 7 1 oz)     Additional Vital Signs:   08/18/21 0800  --  60  17  151/63  91  100 % --  --  --  --   08/18/21 0600  --  60  19  153/57  82  100 %  --  --  --  --   08/18/21 0400  --  62  16  150/63  90  100 %  --  --  --  --   08/18/21 0215  --  60  17  149/64  --  100 %  --  --  --  --   08/17/21 2215  98 4 °F (36 9 °C)  60  22  121/52  --  100 %  --  --  --  --   08/17/21 2030  98 5 °F (36 9 °C)  60  16  115/56  80  100 %  --  --  --  --   08/17/21 1926  98 3 °F (36 8 °C)  60  16  113/55  --  100 %  28  2 L/min  Nasal cannula  --   08/17/21 1845  --  60  18  111/55  79  96 %  --  --  --  --   08/17/21 1830  --  60  14  112/55  79  100 %  --  --  --  --   08/17/21 1828  98 8 °F (37 1 °C)  60  22  111/56  80  100 %  --  --  --  --   08/17/21 1808  98 8 °F (37 1 °C)  60  21  115/56  --  --  --  --  --  --   08/17/21 1540  98 7 °F (37 1 °C)  --  --  --  --  --  --  --  --  --   08/17/21 1515  98 9 °F (37 2 °C)  60  20  127/60  --  97 %  --  --  None (Room air)  --   08/17/21 1400  --  60  32Abnormal   127/58  83  70 %Abnormal   --  --  --  --   08/17/21 1345  98 9 °F (37 2 °C)  60  18  131/57  82  96 %  --  --  None (Room air)  --   08/17/21 1339  98 9 °F (37 2 °C)  60  12  131/57  --  --  --  --  --  --   08/17/21 1255  97 8 °F (36 6 °C)  60  18  101/45Abnormal   --  96 %               Pertinent Labs/Diagnostic Test Results:  8/17 EKG   ECG rate assessment: normal     Rhythm:     Rhythm: paced     Pacing:     Capture:  Complete   Ectopy:     Ectopy: none     QRS:     QRS axis:  Normal     QRS intervals:  Wide   Conduction:     Conduction: normal     ST segments:     ST segments:  Normal   T waves:     T waves: inverted       Inverted:  II, aVF, III, V5 and V6  Results from last 7 days   Lab Units 08/12/21  0829   SARS-COV-2  Negative     Results from last 7 days   Lab Units 08/18/21  0419 08/17/21  2139 08/17/21  1148 08/14/21  0554 08/13/21  1724 08/12/21  1335 08/12/21  0738   WBC Thousand/uL 4 24* 5 11 3 81* 5 98  --    < > 5 32   HEMOGLOBIN g/dL 8 6* 8 7* 6 2* 8 2* 8 3*  --  5 7*   HEMATOCRIT % 26 0* 26 9* 20 0* 25 8* 25 5*   < > 18 5*   PLATELETS Thousands/uL 105* 105* 130* 130*  --    < > 138*   BANDS PCT % 1  --  2  --   --   --  4    < > = values in this interval not displayed       Results from last 7 days   Lab Units 08/18/21  0419 08/17/21  1148 08/14/21  0554 08/13/21  0615 08/12/21  0738   SODIUM mmol/L 138 135* 137 136 137   POTASSIUM mmol/L 4 3 4 4 4 6 4 7 4 1   CHLORIDE mmol/L 102 100 103 103 99*   CO2 mmol/L 29 28 25 27 31   ANION GAP mmol/L 7 7 9 6 7   BUN mg/dL 36* 39* 25 30* 41*   CREATININE mg/dL 0 88 0 99 0 74 0 78 0 98   EGFR ml/min/1 73sq m 61 53 76 71 54   CALCIUM mg/dL 7 8* 7 9* 8 2* 8 0* 8 1*   MAGNESIUM mg/dL  --   --   --   --  2 2     Results from last 7 days   Lab Units 08/17/21  1148 08/13/21  0615 08/12/21  0738   AST U/L 16 13 14   ALT U/L 15 12 17   ALK PHOS U/L 54 52 65   TOTAL PROTEIN g/dL 5 8* 5 7* 6 2*   ALBUMIN g/dL 2 6* 2 5* 2 7*   TOTAL BILIRUBIN mg/dL 0 61 1 00 0 48   BILIRUBIN DIRECT mg/dL  --   --  0 18     Results from last 7 days   Lab Units 08/18/21  0413 08/18/21  0052 08/17/21 2005 08/17/21  1712 08/14/21  1626 08/14/21  1047 08/14/21  0544 08/13/21  2133 08/13/21  1613 08/13/21  1050 08/13/21  0542 08/12/21  1730   POC GLUCOSE mg/dl 118 113 121 138 70 199* 184* 177* 175* 214* 240* 235*     Results from last 7 days   Lab Units 08/18/21  0419 08/17/21  1148 08/14/21  0554 08/13/21  0615 08/12/21  0738   GLUCOSE RANDOM mg/dL 107 270* 186* 221* 149*     Results from last 7 days   Lab Units 08/17/21  1148 08/13/21  0327 08/12/21  2340 08/12/21  1335 08/12/21  0738   TROPONIN I ng/mL 0 11* 0 26* 0 24* 0 21* 0 16*     Results from last 7 days   Lab Units 08/17/21  1148 08/12/21  0738   PROTIME seconds 14 0 14 3   INR  1 13 1 16   PTT seconds 30 27     Results from last 7 days   Lab Units 08/12/21  0738   LACTIC ACID mmol/L 1 6     Results from last 7 days   Lab Units 08/12/21  0738   NT-PRO BNP pg/mL 2,258*     Results from last 7 days   Lab Units 08/18/21  0419 08/17/21  1148 08/12/21  0738   TOTAL COUNTED  100 100 100     ED Treatment:   Medication Administration from 08/17/2021 1058 to 08/18/2021 0841       Date/Time Order Dose Route Action     08/18/2021 0839 DULoxetine (CYMBALTA) delayed release capsule 60 mg 60 mg Oral Given     08/17/2021 1714 DULoxetine (CYMBALTA) delayed release capsule 60 mg 60 mg Oral Given     08/18/2021 0839 escitalopram (LEXAPRO) tablet 5 mg 5 mg Oral Given     08/17/2021 1714 escitalopram (LEXAPRO) tablet 5 mg 5 mg Oral Given     08/18/2021 0839 gabapentin (NEURONTIN) capsule 100 mg 100 mg Oral Given     08/17/2021 2137 gabapentin (NEURONTIN) capsule 100 mg 100 mg Oral Given     08/17/2021 1717 gabapentin (NEURONTIN) capsule 100 mg 100 mg Oral Given     08/17/2021 1714 pravastatin (PRAVACHOL) tablet 80 mg 80 mg Oral Given        Past Medical History:   Diagnosis Date    Anxiety     Chronic atrial fibrillation (HCC)     Colonic polyp     COPD (chronic obstructive pulmonary disease) ([facility] )     Diabetes mellitus type 2 in nonobese (HCC)     Diverticulosis     large intestine without hemmorhage    Gastric ulcer     Heart failure (HCC)     Hyperlipidemia     Hypertension     Iron deficiency anemia     Major depressive disorder     Peripheral vascular disease ([facility] )     T12 compression fracture ([facility] ) 3/1/2019     Present on Admission:   Acute on chronic blood loss anemia   CAD (coronary artery disease) s/p CABG   Chronic atrial fibrillation (HCC)   Diabetes mellitus type 2, uncontrolled ([facility] )   Elevated troponin   Essential hypertension      Admitting Diagnosis: Abnormal laboratory test result [R89 9]  Age/Sex: 79 y o  female  Admission Orders:  Scheduled Medications:  DULoxetine, 60 mg, Oral, BID  escitalopram, 5 mg, Oral, Daily  gabapentin, 100 mg, Oral, TID  insulin lispro, 1-5 Units, Subcutaneous, TID AC  pravastatin, 80 mg, Oral, Daily With Dinner      Continuous IV Infusions:     PRN Meds:  traMADol, 50 mg, Oral, Q6H PRN      Fingerstick q4hr   Transfuse 2 u pRBC   NPO     Network Utilization Review Department  ATTENTION: Please call with any questions or concerns to [phone] and carefully listen to the prompts so that you are directed to the right person  All voicemails are confidential   She Posey all requests for admission clinical reviews, approved or denied determinations and any other requests to dedicated fax number below belonging to the campus where the patient is receiving treatment   List of dedicated fax numbers for the Facilities:  [address] DENIALS (Administrative/Medical Necessity) [phone]   [address] (Maternity/NICU/Pediatrics) [phone]   [address] Timo Mathias [address] [id number] [address] [facility] [address] [facility] [facility] [phone]

## 2021-08-18 NOTE — ASSESSMENT & PLAN NOTE
Lab Results   Component Value Date    HGBA1C 5 9 (H) 04/26/2021       Recent Labs     08/18/21  0413 08/18/21  0838 08/18/21  1108 08/18/21  1611   POCGLU 118 93 129 279*       Blood Sugar Average: Last 72 hrs:    Present on admission history of diabetes  Patient reports that she takes 13-14 use of Lantus b i d  Currently Accu-Cheks in 200s  Will hold long-acting insulin for now since patient is going to be NPO  Continue Accu-Cheks monitoring every 4 hours and adjust insulin as needed      [phone]

## 2021-08-18 NOTE — DISCHARGE INSTR - AVS FIRST PAGE
Recommended close follow-up with primary care provider in 1 week of discharge  Recommended repeat CBC within 1 week to monitor hemoglobin level  Recommended follow-up with Gastroenterology office tomorrow for capsule endoscopy  Start taking Mag-citrate  bowel prep at 5:00 pm tonight, clear liquids for now, nothing by mouth ( NPO ) after midnight  Can give Lantus after does 5 units tonight ( if she has not already received at ER) since patient to be NPO after midnight tonight

## 2021-08-18 NOTE — DISCHARGE SUMMARY
[address]  Discharge- Nicky  1940, 79 y o  female MRN: [id number]  Unit/Bed#: ED 15 Encounter: [id number]  Primary Care Provider: Varinder Humphrey MD   Date and time admitted to hospital: 8/17/2021 10:59 AM    * Acute on chronic blood loss anemia  Assessment & Plan  79year old female with recent hospitalization due to GI bleed had normal EGD and colonoscopy on 08/13/21 and plan was outpatient capsule endoscopy and patient was discharged back to [facility]  Again presented to [facility] Emergency room with anemia of 6 6  Currently hemoglobin stable at 8 6 x 2 after 2 units PRBC transfusion  Currently on my encounter patient is hemodynamically stable  Clear nontoxic appearing  Denies melena, hematuria, hematemesis, any other active overt bleeding noted  Clear liquids for now  Patient has outpatient capsule endoscopy scheduled for tomorrow  Will start on magnesium citrate bowel prep tonight  NPO after midnight tonight  CM has confirmed that patient has transport from nursing home to outpatient GI tomorrow for capsule endoscopy  She is in home dynamically stable for discharge back to nursing home for now  Patient and son at bedside as well as significant other present at bedside all are in agreement  Elevated troponin  Assessment & Plan  Noted down trending from previous admissions  Currently patient is chest pain-free  CAD (coronary artery disease) s/p CABG  Assessment & Plan  Present on admission history of CAD, history of CABG  Currently not on aspirin due to GI bleed  Continue Lopressor, statin  Resume antihypertensive agents  Chronic atrial fibrillation [facility])  Assessment & Plan  Present admission history of chronic AFib  Not on anticoagulation  Resume beta-blocker  Essential hypertension  Assessment & Plan  Resume antihypertensive agents        Diabetes mellitus type 2, uncontrolled ([facility] [address] )  Assessment & Plan  Lab Results   Component Value Date HGBA1C 5 9 (H) 04/26/2021       Recent Labs     08/18/21  0413 08/18/21  0838 08/18/21  1108 08/18/21  1611   POCGLU 118 93 129 279*       Blood Sugar Average: Last 72 hrs:    Present on admission history of diabetes  Patient reports that she takes 13-14 use of Lantus b i d  Currently Accu-Cheks in 200s  Can give of dose of Lantus tonight approximately 5-6 units tonight since she is going to be NPO after midnight  Continue Accu-Cheks monitoring every 4 hours and adjust insulin as needed  Continue Accu-Cheks monitoring and adjust insulin as needed  (P) [phone] [id number]      Medical Problems     Resolved Problems  Date Reviewed: 8/18/2021    None              Discharging Physician / Practitioner: Sukh Ceron MD  PCP: Zakiya Melo MD  Admission Date:   Admission Orders (From admission, onward)     Ordered        08/17/21 1327  Place in Observation  Once                   Discharge Date: 08/18/21      Reason for Admission:  Acute on chronic anemia    Hospital Course:   Ray Vasquez is a 79 y o  female patient PMH of AFib not on anticoagulation at present, COPD, history of CAD, CABG, pacemaker, mesenteric artery stenosis, diabetes, anxiety, CHF, hyperlipidemia, hypertension, peripheral vascular disease, recent hospitalization due to GI bleed and had normal EGD and colonoscopy on 08/13/2021 and patient was discharged back to SNF with plan to follow-up outpatient for capsule endoscopy who presents with anemia  Hemoglobin noted around 6 6 on presentation which improved to 8 6 after urine needs PRBC transfusion  Remained stable at 8 6  No episode of hematuria, melena, hematochezia noted  Overall patient reports feeling well and hemodynamically stable  Requesting for diet  She has outpatient capsule endoscopy scheduled tomorrow a m  Since hemoglobin has remained stable and episode of overt bleeding noted she is hemodynamically stable to be discharge with outpatient follow-up as scheduled    Cm as confirmed that she does have transport set up from nursing home O2 tomorrow a m  To go to outpatient capsule endoscopy  Currently on clear liquids, NPO after midnight  Start on Mag citrate bowel prep from 4-5pm tonight, NPO after midnight  Can give half dose of Lantus 5-6 units tonight since she is going to be NPO after midnight  And resume antihypertensives at nursing home  Currently she is hemodynamically stable for discharge  Cm to arrange transfer  Recommended to have repeat CBC with primary care provider in 1 week to monitor hemoglobin  No other events reported  Patient, her son and her significant other present at bedside are in agreement with above plan  Refer to earlier notes for further clarification  Please see above list of diagnoses and related plan for additional information  Condition at Discharge: good    Discharge Day Visit / Exam:   Subjective:  Reports feeling much better  Hemoglobin 8 6 and stable  Patient denies melena, hematochezia, hematemesis, hematuria, any other new complaints  Denies chest pain, fever, chills  Reports feeling well and requesting for food  Vitals: Blood Pressure: 158/67 (08/18/21 1600)  Pulse: 66 (08/18/21 1600)  Temperature: 98 4 °F (36 9 °C) (08/17/21 2215)  Temp Source: Oral (08/17/21 1808)  Respirations: 18 (08/18/21 1600)  Height: 4' 11" (149 9 cm) (08/18/21 0800)  Weight - Scale: 56 9 kg (125 lb 7 1 oz) (08/18/21 0800)  SpO2: 95 % (08/18/21 1600)  Exam:   Physical Exam  Constitutional:       General: She is not in acute distress  Appearance: Normal appearance  She is not ill-appearing  Comments: Elderly female patient, acutely nontoxic appearing  HENT:      Head: Normocephalic and atraumatic  Cardiovascular:      Rate and Rhythm: Normal rate and regular rhythm  Pulses: Normal pulses  Heart sounds: Normal heart sounds  Pulmonary:      Effort: Pulmonary effort is normal  No respiratory distress        Breath sounds: Normal breath sounds  No stridor  No wheezing or rhonchi  Abdominal:      General: Bowel sounds are normal  There is no distension  Palpations: Abdomen is soft  Tenderness: There is no abdominal tenderness  Musculoskeletal:      Cervical back: Normal range of motion and neck supple  No rigidity  Neurological:      General: No focal deficit present  Mental Status: She is alert and oriented to person, place, and time  Mental status is at baseline  Psychiatric:         Behavior: Behavior normal          Discussion with Family: Updated  (son and significant other) at bedside  Discharge instructions/Information to patient and family:   See after visit summary for information provided to patient and family  Provisions for Follow-Up Care:  See after visit summary for information related to follow-up care and any pertinent home health orders  Disposition:   Other: SNF    Planned Readmission: At high risk     Discharge Statement:  I spent 40 minutes discharging the patient  This time was spent on the day of discharge  I had direct contact with the patient on the day of discharge  Greater than 50% of the total time was spent examining patient, answering all patient questions, arranging and discussing plan of care with patient as well as directly providing post-discharge instructions  Additional time then spent on discharge activities  Discharge Medications:  See after visit summary for reconciled discharge medications provided to patient and/or family        **Please Note: This note may have been constructed using a voice recognition system**

## 2021-08-20 PROBLEM — K92.2 GI BLEED: Status: ACTIVE | Noted: 2021-01-01

## 2021-08-21 NOTE — ANESTHESIA PREPROCEDURE EVALUATION
Procedure:  EGD WITH PUSH ENTEROSCOPY    Relevant Problems   CARDIO   (+) CAD (coronary artery disease) s/p CABG   (+) Chronic atrial fibrillation (HCC)   (+) Essential hypertension   (+) Hyperlipidemia   (+) Mesenteric artery stenosis (HCC)      GI/HEPATIC   (+) GI bleed      HEMATOLOGY   (+) Acute on chronic blood loss anemia      MUSCULOSKELETAL   (+) Degenerative joint disease (DJD) of lumbar spine      NEURO/PSYCH   (+) Atherosclerosis of native artery of both lower extremities with intermittent claudication (HCC)   (+) Depression   (+) Diabetic peripheral neuropathy (HCC)        Physical Exam    Airway    Mallampati score: II  TM Distance: >3 FB  Neck ROM: full     Dental   Comment: edentulous,     Cardiovascular      Pulmonary      Other Findings        Anesthesia Plan  ASA Score- 3     Anesthesia Type- IV sedation with anesthesia with ASA Monitors  Additional Monitors:   Airway Plan:     Comment: I discussed the risks and benefits of IV sedation anesthesia including the possibility of the need to convert to general anesthesia and the potential risk of awareness  Plan Factors-    Chart reviewed  EKG reviewed  Existing labs reviewed  Patient summary reviewed  Patient is not a current smoker (Quit 4 months ago)  Patient did not smoke on day of surgery  Induction- intravenous  Postoperative Plan-     Informed Consent- Anesthetic plan and risks discussed with patient and son              Patient provided verbal consent, but asked son to sign consent form

## 2021-08-21 NOTE — ANESTHESIA POSTPROCEDURE EVALUATION
Post-Op Assessment Note      Pain management: adequate     Mental Status:  Awake   Hydration Status:  Euvolemic   PONV Controlled:  Controlled   Airway Patency:  Patent and adequate      Post Op Vitals Reviewed: Yes      Staff: CRNA         No complications documented      BP   147/67   Temp      Pulse  60   Resp   15   SpO2   98% RA

## 2021-09-22 NOTE — PROGRESS NOTES
Telephone call placed to patient' son, Carmen Alvarado and left a message on his VM to contact me if he needs any assistance  Telephone call placed to [id number] and I spoke to [employer] in Admissions who informed me that patient is still at [facility]  She was not able to provide me a discharge date at this time  I am closing this socially complex episode at this time but will be available if patient's son reaches out to me

## 2021-09-23 NOTE — PROGRESS NOTES
Telephone call received from patient's son Haseeb Dickey and he left a message on my VM  He informed met that patient is still at [facility] but is making progress and reports that she is doing very well  He informed me that he completed all the paperwork for the waiver and is just waiting to hear back      He thanked me for my call
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
## 2021-09-28 PROBLEM — J44.9 COPD (CHRONIC OBSTRUCTIVE PULMONARY DISEASE) (HCC): Status: ACTIVE | Noted: 2021-01-01

## 2021-09-28 PROBLEM — S72.142A CLOSED INTERTROCHANTERIC FRACTURE OF LEFT FEMUR (HCC): Status: ACTIVE | Noted: 2021-01-01

## 2021-09-29 PROBLEM — K92.2 GI BLEED: Chronic | Status: ACTIVE | Noted: 2021-01-01

## 2021-09-29 NOTE — ANESTHESIA PREPROCEDURE EVALUATION
Procedure:  INSERTION NAIL IM FEMUR ANTEGRADE (TROCHANTERIC)- LEFT (Left Leg Upper)    Relevant Problems   CARDIO   (+) CAD (coronary artery disease) s/p CABG   (+) Chronic atrial fibrillation (HCC)   (+) Essential hypertension   (+) Hyperlipidemia      GI/HEPATIC   (+) GI bleed   (+) Gastrointestinal hemorrhage associated with gastric ulcer      HEMATOLOGY   (+) Acute on chronic blood loss anemia      MUSCULOSKELETAL   (+) Degenerative joint disease (DJD) of lumbar spine      NEURO/PSYCH   (+) Atherosclerosis of native artery of both lower extremities with intermittent claudication (HCC)   (+) Depression   (+) Diabetic peripheral neuropathy (HCC)      PULMONARY   (+) COPD (chronic obstructive pulmonary disease) (HCC)        Physical Exam    Airway    Mallampati score: III  TM Distance: >3 FB  Neck ROM: full     Dental       Cardiovascular  Cardiovascular exam normal    Pulmonary  Pulmonary exam normal     Other Findings        Principal Problem:    Closed intertrochanteric fracture of left femur (HCC)  Active Problems:    Fall    Diabetes mellitus type 2, uncontrolled ([address] )    Hyperlipidemia    Essential hypertension    Chronic atrial fibrillation (HCC)    Elevated troponin    COPD (chronic obstructive pulmonary disease) (HCC)  Atherosclerosis of native artery of both lower extremities with intermittent claudication (HCC)   Carotid stenosis, bilateral   Carpal tunnel syndrome on both sides   Depression   Diabetes mellitus type 2, uncontrolled (HCC)   Diabetic peripheral neuropathy (HCC)   Hyperlipidemia   Essential hypertension   Mesenteric artery stenosis (HCC)   Seborrheic keratosis   Skin lesion   Vitamin D deficiency   Cerebral infarction (HCC)   Peripheral neuropathy   Trigger little finger of right hand   Urinary incontinence in female   Vertigo   Degenerative joint disease (DJD) of lumbar spine   Pain of right hip joint   Gastrointestinal hemorrhage associated with gastric ulcer   Acute on chronic blood loss anemia   Chronic atrial fibrillation (HCC)   CAD (coronary artery disease) s/p CABG   Pacemaker   Elevated troponin   Abnormal CT of the chest   GI bleed   Closed intertrochanteric fracture of left femur (HCC)   Fall   COPD (chronic obstructive pulmonary disease) [facility])           Fall  Assessment & Plan  Presyncope  Presents from the nursing home after witnessed fall   Patient tried to get up from wheelchair and legs gave up and she hit the floor  Associated severe pain and deformity  No LOC per bf  She could not recall if she was dizzy  Denied CP, shortness of breath  Currently not on AC  Hip Xray- Comminuted intertrochanteric fracture of the left proximal femur  CTH and cervical spine w/o contrast- no intracranial abnormalities or cervical #  EKG showed LBBB, Sinus rhythm with 1st degree AV block  Fall precautions  Monitor telemetry  Interrogate PPM   Trend troponin  Check orthostatic vitals -sitting and laying down- after procedure  PT/OT                    * Closed intertrochanteric fracture of left femur [facility])  Assessment & Plan  Presents from the nursing home after witnessed fall   Patient tried to get up from wheelchair and legs gave up and she hit the floor  Associated severe pain and deformity  No LOC per bf  She could not recall if she was dizzy  Currently not on AC  Hip Xray- Comminuted intertrochanteric fracture of the left proximal femur  CTH and cervical spine w/o contrast- no intracranial abnormalities or cervical #  Pain regimen - Dilaudid prn for breakthrough and severe pain  Oxycodone prn for moderate pain and tylenol for mild pain  Consult Orthopedic surgery  NPO for possible orthopedic procedure  Patient  Wishes to have procedure  Fall precautions               COPD (chronic obstructive pulmonary disease) ([facility] [address] )  Assessment & Plan  Not in exacerbation  Continue albuterol  Neb prn    Continue Spiriva+ salmeterol      Elevated troponin  Assessment & Plan  Denied cp, shortness of breath  Presyncope ? Due to orthostasis  Trop 0 11  EKG LBBB  Trend troponin  Telemetry monitoring  Serial ekg if cp     Chronic atrial fibrillation (HCC)  Assessment & Plan  Continue lopressor 12 5mg BID  Not on AC due to recent GIB  Telemetry      Essential hypertension  Assessment & Plan  Continue lopressor 12 5mg bid  Continue lisinopril 2 5mg daily  Hold torsemide for now      Hyperlipidemia  Assessment & Plan  Continue statin      Diabetes mellitus type 2, uncontrolled ([facility] [address] )  Assessment & Plan        Lab Results   Component Value Date     HGBA1C 5 9 (H) 04/26/2021           Cardiology Maykel  Anesthesia Plan  ASA Score- 4     Anesthesia Type- general with ASA Monitors  Additional Monitors:   Airway Plan: ETT  Plan Factors-Exercise tolerance (METS): <4 METS  Chart reviewed  EKG reviewed  Imaging results reviewed  Existing labs reviewed  Patient summary reviewed  Patient is not a current smoker  Induction- intravenous  Postoperative Plan- Plan for postoperative opioid use  Planned trial extubation    Informed Consent- Anesthetic plan and risks discussed with patient  I personally reviewed this patient with the CRNA  Discussed and agreed on the Anesthesia Plan with the CRNA  Torres Fontanez Assessment/Plan      Assessment:  1  Hip fracture  2  S/P Fall  3  S/P GI bleed  4  Atrial fibrillation  5  CAD, S/P CABG     Plan:  1  Stable cardiovascular status  2  Cleared for surgery     History of Present Illness         Physician Requesting Consult: Kishore Payan MD  Reason for Consult / Principal Problem: Hip fx  HPI: Parth Gonzalez is a 79y o  year old female who presents status post fall from wheelchair  The patient is unable to recall events of the fall, but states that she felt immediate pain in her left hip with inability to bear weight  She was taken to the ED where x-ray reveled a left intertrochanteric femur fracture

## 2021-09-29 NOTE — ANESTHESIA POSTPROCEDURE EVALUATION
Post-Op Assessment Note    CV Status:  Stable    Pain management: adequate     Mental Status:  Awake   Hydration Status:  Euvolemic   PONV Controlled:  Controlled   Airway Patency:  Patent and adequate   Two or more mitigation strategies used for obstructive sleep apnea   Post Op Vitals Reviewed: Yes      Staff: CRNA         No complications documented      BP   158/62   Temp      Pulse  68   Resp   15   SpO2   98% 6l

## 2021-09-30 NOTE — PROGRESS NOTES
Telephone call received from patient's son Augusta Magallon  He informed me that patient fell at [address] and broke her hip  Patient is presently at [facility] and had surgery on her hip yesterday  He does not want patient to go back to [facility] for rehab  Augustakt Magallon also informed me that patient was approved for waiver services and I advised him to speak to patient's  about increasing patient's approved hours due to her recent fall  I also advised him to work with patient in patient CM regarding discharge planning and discussed possible Acute Rehab placement  I informed him that patient has [employer]vantage and acute rehab will need to be approved by insurance first     Augusta Magallon  informed me that he is willing to place patient out of the area if she gets the appropriate care  Augusta Naun reports that he is meeting with the in patient Grey KHAN today to discuss STR

## 2021-10-01 PROBLEM — N17.9 ACUTE KIDNEY INJURY (HCC): Status: ACTIVE | Noted: 2021-01-01

## 2021-10-01 PROBLEM — E87.1 HYPONATREMIA: Status: ACTIVE | Noted: 2021-01-01

## 2021-10-01 PROBLEM — T82.898A FEMORAL-POPLITEAL BYPASS GRAFT OCCLUSION, RIGHT, INITIAL ENCOUNTER (HCC): Status: ACTIVE | Noted: 2021-01-01

## 2021-10-01 PROBLEM — E87.5 HYPERKALEMIA: Status: ACTIVE | Noted: 2021-01-01

## 2021-10-03 PROBLEM — G93.40 ACUTE ENCEPHALOPATHY: Status: ACTIVE | Noted: 2021-01-01

## 2021-10-03 PROBLEM — I73.9 PAD (PERIPHERAL ARTERY DISEASE) (HCC): Status: ACTIVE | Noted: 2021-01-01

## 2021-10-04 PROBLEM — N39.0 URINARY TRACT INFECTION: Status: ACTIVE | Noted: 2021-01-01

## 2021-10-06 PROBLEM — L89.621 PRESSURE INJURY OF LEFT HEEL, STAGE 1: Status: ACTIVE | Noted: 2021-01-01

## 2021-10-06 PROBLEM — L89.301 PRESSURE INJURY OF BUTTOCK, STAGE 1: Status: ACTIVE | Noted: 2021-01-01

## 2021-10-11 PROBLEM — R26.2 AMBULATORY DYSFUNCTION: Status: ACTIVE | Noted: 2021-01-01

## 2021-10-12 PROBLEM — R68.89 ACTIVITY INTOLERANCE: Status: ACTIVE | Noted: 2021-01-01

## 2021-10-12 PROBLEM — T82.898D: Status: ACTIVE | Noted: 2021-01-01

## 2021-10-13 PROBLEM — T14.8XXA DEEP TISSUE INJURY: Status: ACTIVE | Noted: 2021-01-01

## 2021-10-13 PROBLEM — L98.491 TRAUMATIC SKIN ULCER, LIMITED TO BREAKDOWN OF SKIN (HCC): Status: ACTIVE | Noted: 2021-01-01

## 2021-10-13 PROBLEM — T14.8XXA SURGICAL WOUND PRESENT: Status: ACTIVE | Noted: 2021-01-01

## 2021-10-13 PROBLEM — L89.152 PRESSURE INJURY OF SACRAL REGION, STAGE 2 (HCC): Status: ACTIVE | Noted: 2021-01-01

## 2021-10-13 PROBLEM — L89.302 PRESSURE INJURY OF BUTTOCK, STAGE 2 (HCC): Status: ACTIVE | Noted: 2021-01-01

## 2021-10-18 PROBLEM — R53.81 DEBILITY: Status: ACTIVE | Noted: 2021-01-01

## 2022-01-01 ENCOUNTER — APPOINTMENT (OUTPATIENT)
Dept: LAB | Facility: HOSPITAL | Age: 82
End: 2022-01-01
Payer: COMMERCIAL

## 2022-01-01 ENCOUNTER — TELEPHONE (OUTPATIENT)
Dept: INTERNAL MEDICINE CLINIC | Facility: CLINIC | Age: 82
End: 2022-01-01

## 2022-01-01 ENCOUNTER — OFFICE VISIT (OUTPATIENT)
Dept: OBGYN CLINIC | Facility: CLINIC | Age: 82
End: 2022-01-01
Payer: COMMERCIAL

## 2022-01-01 ENCOUNTER — PATIENT OUTREACH (OUTPATIENT)
Dept: INTERNAL MEDICINE CLINIC | Facility: CLINIC | Age: 82
End: 2022-01-01

## 2022-01-01 ENCOUNTER — OFFICE VISIT (OUTPATIENT)
Dept: INTERNAL MEDICINE CLINIC | Facility: CLINIC | Age: 82
End: 2022-01-01
Payer: COMMERCIAL

## 2022-01-01 ENCOUNTER — TELEPHONE (OUTPATIENT)
Dept: OBGYN CLINIC | Facility: HOSPITAL | Age: 82
End: 2022-01-01

## 2022-01-01 ENCOUNTER — RA CDI HCC (OUTPATIENT)
Dept: OTHER | Facility: HOSPITAL | Age: 82
End: 2022-01-01

## 2022-01-01 ENCOUNTER — TELEPHONE (OUTPATIENT)
Dept: SURGERY | Facility: CLINIC | Age: 82
End: 2022-01-01

## 2022-01-01 ENCOUNTER — CLINICAL SUPPORT (OUTPATIENT)
Dept: INTERNAL MEDICINE CLINIC | Facility: CLINIC | Age: 82
End: 2022-01-01

## 2022-01-01 ENCOUNTER — HOSPITAL ENCOUNTER (OUTPATIENT)
Dept: RADIOLOGY | Facility: HOSPITAL | Age: 82
Discharge: HOME | End: 2022-05-20
Attending: INTERNAL MEDICINE
Payer: COMMERCIAL

## 2022-01-01 ENCOUNTER — APPOINTMENT (OUTPATIENT)
Dept: RADIOLOGY | Facility: CLINIC | Age: 82
End: 2022-01-01
Payer: COMMERCIAL

## 2022-01-01 VITALS
TEMPERATURE: 98.1 F | DIASTOLIC BLOOD PRESSURE: 76 MMHG | BODY MASS INDEX: 26.86 KG/M2 | HEART RATE: 60 BPM | OXYGEN SATURATION: 95 % | SYSTOLIC BLOOD PRESSURE: 132 MMHG | RESPIRATION RATE: 18 BRPM | HEIGHT: 59 IN

## 2022-01-01 VITALS
WEIGHT: 130.4 LBS | BODY MASS INDEX: 26.29 KG/M2 | DIASTOLIC BLOOD PRESSURE: 66 MMHG | OXYGEN SATURATION: 96 % | HEIGHT: 59 IN | SYSTOLIC BLOOD PRESSURE: 142 MMHG | TEMPERATURE: 97.5 F | HEART RATE: 76 BPM

## 2022-01-01 VITALS
DIASTOLIC BLOOD PRESSURE: 80 MMHG | HEIGHT: 59 IN | TEMPERATURE: 97.2 F | OXYGEN SATURATION: 92 % | RESPIRATION RATE: 14 BRPM | SYSTOLIC BLOOD PRESSURE: 130 MMHG | BODY MASS INDEX: 26.86 KG/M2 | HEART RATE: 60 BPM

## 2022-01-01 VITALS
HEIGHT: 59 IN | WEIGHT: 133 LBS | RESPIRATION RATE: 20 BRPM | DIASTOLIC BLOOD PRESSURE: 62 MMHG | OXYGEN SATURATION: 90 % | HEART RATE: 80 BPM | BODY MASS INDEX: 26.81 KG/M2 | SYSTOLIC BLOOD PRESSURE: 130 MMHG

## 2022-01-01 VITALS
RESPIRATION RATE: 14 BRPM | OXYGEN SATURATION: 93 % | DIASTOLIC BLOOD PRESSURE: 70 MMHG | BODY MASS INDEX: 26.86 KG/M2 | SYSTOLIC BLOOD PRESSURE: 168 MMHG | HEIGHT: 59 IN | HEART RATE: 91 BPM | TEMPERATURE: 98.6 F

## 2022-01-01 VITALS
TEMPERATURE: 98.7 F | DIASTOLIC BLOOD PRESSURE: 66 MMHG | BODY MASS INDEX: 26.97 KG/M2 | RESPIRATION RATE: 16 BRPM | WEIGHT: 133.8 LBS | OXYGEN SATURATION: 99 % | HEIGHT: 59 IN | SYSTOLIC BLOOD PRESSURE: 189 MMHG | HEART RATE: 63 BPM

## 2022-01-01 VITALS
BODY MASS INDEX: 26.81 KG/M2 | WEIGHT: 133 LBS | SYSTOLIC BLOOD PRESSURE: 182 MMHG | HEIGHT: 59 IN | HEART RATE: 60 BPM | DIASTOLIC BLOOD PRESSURE: 70 MMHG

## 2022-01-01 VITALS — OXYGEN SATURATION: 96 % | HEART RATE: 72 BPM

## 2022-01-01 VITALS
HEART RATE: 59 BPM | DIASTOLIC BLOOD PRESSURE: 83 MMHG | SYSTOLIC BLOOD PRESSURE: 168 MMHG | BODY MASS INDEX: 27.02 KG/M2 | HEIGHT: 59 IN

## 2022-01-01 DIAGNOSIS — I10 ESSENTIAL HYPERTENSION: ICD-10-CM

## 2022-01-01 DIAGNOSIS — K55.1 MESENTERIC ARTERY STENOSIS (HCC): Primary | ICD-10-CM

## 2022-01-01 DIAGNOSIS — E11.69 TYPE 2 DIABETES MELLITUS WITH OTHER SPECIFIED COMPLICATION, WITH LONG-TERM CURRENT USE OF INSULIN (HCC): ICD-10-CM

## 2022-01-01 DIAGNOSIS — E11.69 TYPE 2 DIABETES MELLITUS WITH OTHER SPECIFIED COMPLICATION, WITH LONG-TERM CURRENT USE OF INSULIN (HCC): Primary | ICD-10-CM

## 2022-01-01 DIAGNOSIS — Z79.4 TYPE 2 DIABETES MELLITUS WITH OTHER SPECIFIED COMPLICATION, WITH LONG-TERM CURRENT USE OF INSULIN (HCC): ICD-10-CM

## 2022-01-01 DIAGNOSIS — I25.10 CORONARY ARTERY DISEASE WITHOUT ANGINA PECTORIS, UNSPECIFIED VESSEL OR LESION TYPE, UNSPECIFIED WHETHER NATIVE OR TRANSPLANTED HEART: ICD-10-CM

## 2022-01-01 DIAGNOSIS — S72.145D CLOSED NONDISPLACED INTERTROCHANTERIC FRACTURE OF LEFT FEMUR WITH ROUTINE HEALING, SUBSEQUENT ENCOUNTER: ICD-10-CM

## 2022-01-01 DIAGNOSIS — E11.42 DIABETIC PERIPHERAL NEUROPATHY (HCC): ICD-10-CM

## 2022-01-01 DIAGNOSIS — F41.9 ANXIETY AND DEPRESSION: ICD-10-CM

## 2022-01-01 DIAGNOSIS — R26.81 UNSTEADY GAIT: ICD-10-CM

## 2022-01-01 DIAGNOSIS — R91.8 MASS OF RIGHT LUNG: ICD-10-CM

## 2022-01-01 DIAGNOSIS — Z87.81 S/P ORIF (OPEN REDUCTION INTERNAL FIXATION) FRACTURE: ICD-10-CM

## 2022-01-01 DIAGNOSIS — T82.898D FEMORAL-POPLITEAL BYPASS GRAFT OCCLUSION, RIGHT, SUBSEQUENT ENCOUNTER: ICD-10-CM

## 2022-01-01 DIAGNOSIS — R09.89 BILATERAL RALES: ICD-10-CM

## 2022-01-01 DIAGNOSIS — Z87.81 S/P ORIF (OPEN REDUCTION INTERNAL FIXATION) FRACTURE: Primary | ICD-10-CM

## 2022-01-01 DIAGNOSIS — Z00.00 MEDICARE ANNUAL WELLNESS VISIT, SUBSEQUENT: Primary | ICD-10-CM

## 2022-01-01 DIAGNOSIS — R68.89 ACTIVITY INTOLERANCE: Primary | ICD-10-CM

## 2022-01-01 DIAGNOSIS — R06.02 SHORTNESS OF BREATH: ICD-10-CM

## 2022-01-01 DIAGNOSIS — E55.9 VITAMIN D DEFICIENCY: ICD-10-CM

## 2022-01-01 DIAGNOSIS — R32 URINARY INCONTINENCE IN FEMALE: ICD-10-CM

## 2022-01-01 DIAGNOSIS — R91.8 LUNG MASS: ICD-10-CM

## 2022-01-01 DIAGNOSIS — Z78.0 POSTMENOPAUSAL: Primary | ICD-10-CM

## 2022-01-01 DIAGNOSIS — M25.552 CHRONIC LEFT HIP PAIN: ICD-10-CM

## 2022-01-01 DIAGNOSIS — M54.6 ACUTE RIGHT-SIDED THORACIC BACK PAIN: ICD-10-CM

## 2022-01-01 DIAGNOSIS — R06.02 SHORTNESS OF BREATH: Primary | ICD-10-CM

## 2022-01-01 DIAGNOSIS — J44.9 CHRONIC OBSTRUCTIVE PULMONARY DISEASE, UNSPECIFIED COPD TYPE (HCC): Primary | ICD-10-CM

## 2022-01-01 DIAGNOSIS — Z98.890 S/P ORIF (OPEN REDUCTION INTERNAL FIXATION) FRACTURE: Primary | ICD-10-CM

## 2022-01-01 DIAGNOSIS — J06.9 UPPER RESPIRATORY TRACT INFECTION, UNSPECIFIED TYPE: ICD-10-CM

## 2022-01-01 DIAGNOSIS — G89.29 CHRONIC LEFT HIP PAIN: ICD-10-CM

## 2022-01-01 DIAGNOSIS — R60.0 BILATERAL EDEMA OF LOWER EXTREMITY: ICD-10-CM

## 2022-01-01 DIAGNOSIS — R05.9 COUGH: ICD-10-CM

## 2022-01-01 DIAGNOSIS — J44.9 CHRONIC OBSTRUCTIVE PULMONARY DISEASE, UNSPECIFIED COPD TYPE (HCC): ICD-10-CM

## 2022-01-01 DIAGNOSIS — R26.2 AMBULATORY DYSFUNCTION: ICD-10-CM

## 2022-01-01 DIAGNOSIS — E11.649 UNCONTROLLED TYPE 2 DIABETES MELLITUS WITH HYPOGLYCEMIA WITHOUT COMA (HCC): ICD-10-CM

## 2022-01-01 DIAGNOSIS — R04.0 EPISTAXIS: Primary | ICD-10-CM

## 2022-01-01 DIAGNOSIS — R04.0 EPISTAXIS: ICD-10-CM

## 2022-01-01 DIAGNOSIS — F32.A ANXIETY AND DEPRESSION: ICD-10-CM

## 2022-01-01 DIAGNOSIS — Z11.52 ENCOUNTER FOR SCREENING FOR COVID-19: ICD-10-CM

## 2022-01-01 DIAGNOSIS — I10 ESSENTIAL HYPERTENSION: Primary | ICD-10-CM

## 2022-01-01 DIAGNOSIS — Z98.890 S/P ORIF (OPEN REDUCTION INTERNAL FIXATION) FRACTURE: ICD-10-CM

## 2022-01-01 DIAGNOSIS — Z48.89 AFTERCARE FOLLOWING SURGERY: ICD-10-CM

## 2022-01-01 DIAGNOSIS — Z79.4 TYPE 2 DIABETES MELLITUS WITH OTHER SPECIFIED COMPLICATION, WITH LONG-TERM CURRENT USE OF INSULIN (HCC): Primary | ICD-10-CM

## 2022-01-01 DIAGNOSIS — M47.26 OSTEOARTHRITIS OF SPINE WITH RADICULOPATHY, LUMBAR REGION: ICD-10-CM

## 2022-01-01 LAB
ALBUMIN SERPL BCP-MCNC: 2.3 G/DL (ref 3.5–5)
ALP SERPL-CCNC: 62 U/L (ref 46–116)
ALT SERPL W P-5'-P-CCNC: 7 U/L (ref 12–78)
ANION GAP SERPL CALCULATED.3IONS-SCNC: 9 MMOL/L (ref 4–13)
AST SERPL W P-5'-P-CCNC: 10 U/L (ref 5–45)
BASOPHILS # BLD AUTO: 0.02 THOUSANDS/ΜL (ref 0–0.1)
BASOPHILS # BLD AUTO: 0.04 THOUSANDS/ΜL (ref 0–0.1)
BASOPHILS NFR BLD AUTO: 0 % (ref 0–1)
BASOPHILS NFR BLD AUTO: 1 % (ref 0–1)
BILIRUB SERPL-MCNC: 0.96 MG/DL (ref 0.2–1)
BUN SERPL-MCNC: 17 MG/DL (ref 5–25)
CALCIUM ALBUM COR SERPL-MCNC: 10.2 MG/DL (ref 8.3–10.1)
CALCIUM SERPL-MCNC: 8.8 MG/DL (ref 8.3–10.1)
CHLORIDE SERPL-SCNC: 97 MMOL/L (ref 100–108)
CO2 SERPL-SCNC: 26 MMOL/L (ref 21–32)
CREAT SERPL-MCNC: 0.88 MG/DL (ref 0.6–1.3)
EOSINOPHIL # BLD AUTO: 0.03 THOUSAND/ΜL (ref 0–0.61)
EOSINOPHIL # BLD AUTO: 0.03 THOUSAND/ΜL (ref 0–0.61)
EOSINOPHIL NFR BLD AUTO: 0 % (ref 0–6)
EOSINOPHIL NFR BLD AUTO: 0 % (ref 0–6)
ERYTHROCYTE [DISTWIDTH] IN BLOOD BY AUTOMATED COUNT: 14.5 % (ref 11.6–15.1)
ERYTHROCYTE [DISTWIDTH] IN BLOOD BY AUTOMATED COUNT: 15.1 % (ref 11.6–15.1)
GFR SERPL CREATININE-BSD FRML MDRD: 60 ML/MIN/1.73SQ M
GLUCOSE SERPL-MCNC: 243 MG/DL (ref 65–140)
HCT VFR BLD AUTO: 30.2 % (ref 34.8–46.1)
HCT VFR BLD AUTO: 32.8 % (ref 34.8–46.1)
HGB BLD-MCNC: 10.5 G/DL (ref 11.5–15.4)
HGB BLD-MCNC: 9.3 G/DL (ref 11.5–15.4)
IMM GRANULOCYTES # BLD AUTO: 0.14 THOUSAND/UL (ref 0–0.2)
IMM GRANULOCYTES # BLD AUTO: 0.16 THOUSAND/UL (ref 0–0.2)
IMM GRANULOCYTES NFR BLD AUTO: 2 % (ref 0–2)
IMM GRANULOCYTES NFR BLD AUTO: 2 % (ref 0–2)
LYMPHOCYTES # BLD AUTO: 0.95 THOUSANDS/ΜL (ref 0.6–4.47)
LYMPHOCYTES # BLD AUTO: 1.16 THOUSANDS/ΜL (ref 0.6–4.47)
LYMPHOCYTES NFR BLD AUTO: 12 % (ref 14–44)
LYMPHOCYTES NFR BLD AUTO: 17 % (ref 14–44)
MCH RBC QN AUTO: 29.3 PG (ref 26.8–34.3)
MCH RBC QN AUTO: 30.6 PG (ref 26.8–34.3)
MCHC RBC AUTO-ENTMCNC: 30.8 G/DL (ref 31.4–37.4)
MCHC RBC AUTO-ENTMCNC: 32 G/DL (ref 31.4–37.4)
MCV RBC AUTO: 95 FL (ref 82–98)
MCV RBC AUTO: 96 FL (ref 82–98)
MONOCYTES # BLD AUTO: 1.13 THOUSAND/ΜL (ref 0.17–1.22)
MONOCYTES # BLD AUTO: 1.27 THOUSAND/ΜL (ref 0.17–1.22)
MONOCYTES NFR BLD AUTO: 14 % (ref 4–12)
MONOCYTES NFR BLD AUTO: 19 % (ref 4–12)
NEUTROPHILS # BLD AUTO: 4.05 THOUSANDS/ΜL (ref 1.85–7.62)
NEUTROPHILS # BLD AUTO: 5.76 THOUSANDS/ΜL (ref 1.85–7.62)
NEUTS SEG NFR BLD AUTO: 61 % (ref 43–75)
NEUTS SEG NFR BLD AUTO: 72 % (ref 43–75)
NRBC BLD AUTO-RTO: 0 /100 WBCS
NRBC BLD AUTO-RTO: 0 /100 WBCS
PLATELET # BLD AUTO: 208 THOUSANDS/UL (ref 149–390)
PLATELET # BLD AUTO: 211 THOUSANDS/UL (ref 149–390)
PMV BLD AUTO: 8.9 FL (ref 8.9–12.7)
PMV BLD AUTO: 9.1 FL (ref 8.9–12.7)
POTASSIUM SERPL-SCNC: 5.6 MMOL/L (ref 3.5–5.3)
PROT SERPL-MCNC: 7.1 G/DL (ref 6.4–8.2)
RBC # BLD AUTO: 3.17 MILLION/UL (ref 3.81–5.12)
RBC # BLD AUTO: 3.43 MILLION/UL (ref 3.81–5.12)
SARS-COV-2 RNA RESP QL NAA+PROBE: NEGATIVE
SODIUM SERPL-SCNC: 132 MMOL/L (ref 136–145)
WBC # BLD AUTO: 6.71 THOUSAND/UL (ref 4.31–10.16)
WBC # BLD AUTO: 8.03 THOUSAND/UL (ref 4.31–10.16)

## 2022-01-01 PROCEDURE — 73552 X-RAY EXAM OF FEMUR 2/>: CPT

## 2022-01-01 PROCEDURE — 3077F SYST BP >= 140 MM HG: CPT | Performed by: INTERNAL MEDICINE

## 2022-01-01 PROCEDURE — 99213 OFFICE O/P EST LOW 20 MIN: CPT | Performed by: NURSE PRACTITIONER

## 2022-01-01 PROCEDURE — 3077F SYST BP >= 140 MM HG: CPT | Performed by: ORTHOPAEDIC SURGERY

## 2022-01-01 PROCEDURE — 1170F FXNL STATUS ASSESSED: CPT | Performed by: INTERNAL MEDICINE

## 2022-01-01 PROCEDURE — 1036F TOBACCO NON-USER: CPT | Performed by: INTERNAL MEDICINE

## 2022-01-01 PROCEDURE — 3078F DIAST BP <80 MM HG: CPT | Performed by: INTERNAL MEDICINE

## 2022-01-01 PROCEDURE — 1125F AMNT PAIN NOTED PAIN PRSNT: CPT | Performed by: INTERNAL MEDICINE

## 2022-01-01 PROCEDURE — 3288F FALL RISK ASSESSMENT DOCD: CPT | Performed by: INTERNAL MEDICINE

## 2022-01-01 PROCEDURE — 71046 X-RAY EXAM CHEST 2 VIEWS: CPT

## 2022-01-01 PROCEDURE — 99214 OFFICE O/P EST MOD 30 MIN: CPT | Performed by: INTERNAL MEDICINE

## 2022-01-01 PROCEDURE — 3078F DIAST BP <80 MM HG: CPT | Performed by: ORTHOPAEDIC SURGERY

## 2022-01-01 PROCEDURE — 1160F RVW MEDS BY RX/DR IN RCRD: CPT | Performed by: INTERNAL MEDICINE

## 2022-01-01 PROCEDURE — 1160F RVW MEDS BY RX/DR IN RCRD: CPT | Performed by: ORTHOPAEDIC SURGERY

## 2022-01-01 PROCEDURE — 3075F SYST BP GE 130 - 139MM HG: CPT | Performed by: INTERNAL MEDICINE

## 2022-01-01 PROCEDURE — U0003 INFECTIOUS AGENT DETECTION BY NUCLEIC ACID (DNA OR RNA); SEVERE ACUTE RESPIRATORY SYNDROME CORONAVIRUS 2 (SARS-COV-2) (CORONAVIRUS DISEASE [COVID-19]), AMPLIFIED PROBE TECHNIQUE, MAKING USE OF HIGH THROUGHPUT TECHNOLOGIES AS DESCRIBED BY CMS-2020-01-R: HCPCS | Performed by: INTERNAL MEDICINE

## 2022-01-01 PROCEDURE — 3079F DIAST BP 80-89 MM HG: CPT | Performed by: ORTHOPAEDIC SURGERY

## 2022-01-01 PROCEDURE — 3725F SCREEN DEPRESSION PERFORMED: CPT | Performed by: INTERNAL MEDICINE

## 2022-01-01 PROCEDURE — 85025 COMPLETE CBC W/AUTO DIFF WBC: CPT

## 2022-01-01 PROCEDURE — G0439 PPPS, SUBSEQ VISIT: HCPCS | Performed by: INTERNAL MEDICINE

## 2022-01-01 PROCEDURE — 1100F PTFALLS ASSESS-DOCD GE2>/YR: CPT | Performed by: NURSE PRACTITIONER

## 2022-01-01 PROCEDURE — U0005 INFEC AGEN DETEC AMPLI PROBE: HCPCS | Performed by: INTERNAL MEDICINE

## 2022-01-01 PROCEDURE — 1036F TOBACCO NON-USER: CPT | Performed by: ORTHOPAEDIC SURGERY

## 2022-01-01 PROCEDURE — 36415 COLL VENOUS BLD VENIPUNCTURE: CPT

## 2022-01-01 PROCEDURE — 99213 OFFICE O/P EST LOW 20 MIN: CPT | Performed by: ORTHOPAEDIC SURGERY

## 2022-01-01 PROCEDURE — 80053 COMPREHEN METABOLIC PANEL: CPT

## 2022-01-01 PROCEDURE — 3288F FALL RISK ASSESSMENT DOCD: CPT | Performed by: NURSE PRACTITIONER

## 2022-01-01 PROCEDURE — 3079F DIAST BP 80-89 MM HG: CPT | Performed by: INTERNAL MEDICINE

## 2022-01-01 RX ORDER — ISOSORBIDE MONONITRATE 30 MG/1
30 TABLET, EXTENDED RELEASE ORAL DAILY
COMMUNITY

## 2022-01-01 RX ORDER — INSULIN ASPART 100 [IU]/ML
5 INJECTION, SOLUTION INTRAVENOUS; SUBCUTANEOUS
Qty: 15 ML | Refills: 5 | Status: SHIPPED | OUTPATIENT
Start: 2022-01-01 | End: 2022-01-01 | Stop reason: SDUPTHER

## 2022-01-01 RX ORDER — INSULIN DETEMIR 100 [IU]/ML
25 INJECTION, SOLUTION SUBCUTANEOUS DAILY
Qty: 15 ML | Refills: 5 | Status: SHIPPED | OUTPATIENT
Start: 2022-01-01 | End: 2022-01-01 | Stop reason: SDUPTHER

## 2022-01-01 RX ORDER — INSULIN ASPART 100 [IU]/ML
8 INJECTION, SOLUTION INTRAVENOUS; SUBCUTANEOUS
Qty: 15 ML | Refills: 5
Start: 2022-01-01

## 2022-01-01 RX ORDER — DULOXETIN HYDROCHLORIDE 60 MG/1
60 CAPSULE, DELAYED RELEASE ORAL DAILY
Qty: 30 CAPSULE | Refills: 3
Start: 2022-01-01

## 2022-01-01 RX ORDER — LISINOPRIL 5 MG/1
5 TABLET ORAL 2 TIMES DAILY
Qty: 60 TABLET | Refills: 5 | Status: SHIPPED | OUTPATIENT
Start: 2022-01-01 | End: 2022-01-01

## 2022-01-01 RX ORDER — INSULIN DETEMIR 100 [IU]/ML
28 INJECTION, SOLUTION SUBCUTANEOUS DAILY
Qty: 15 ML | Refills: 5
Start: 2022-01-01

## 2022-01-01 RX ORDER — TIOTROPIUM BROMIDE AND OLODATEROL 3.124; 2.736 UG/1; UG/1
SPRAY, METERED RESPIRATORY (INHALATION)
Qty: 4 G | Refills: 3 | Status: SHIPPED | OUTPATIENT
Start: 2022-01-01

## 2022-01-01 RX ORDER — AZITHROMYCIN 250 MG/1
TABLET, FILM COATED ORAL
Qty: 6 TABLET | Refills: 0 | OUTPATIENT
Start: 2022-01-01

## 2022-01-01 RX ORDER — INSULIN DETEMIR 100 [IU]/ML
27 INJECTION, SOLUTION SUBCUTANEOUS DAILY
Qty: 15 ML | Refills: 5 | Status: SHIPPED | OUTPATIENT
Start: 2022-01-01 | End: 2022-01-01 | Stop reason: SDUPTHER

## 2022-01-01 RX ORDER — ESCITALOPRAM OXALATE 10 MG/1
10 TABLET ORAL DAILY
Qty: 30 TABLET | Refills: 3 | Status: SHIPPED | OUTPATIENT
Start: 2022-01-01 | End: 2022-01-01

## 2022-01-01 RX ORDER — AZITHROMYCIN 250 MG/1
TABLET, FILM COATED ORAL
Qty: 6 TABLET | Refills: 0 | Status: SHIPPED | OUTPATIENT
Start: 2022-01-01 | End: 2022-01-01

## 2022-01-01 RX ORDER — AMITRIPTYLINE HYDROCHLORIDE 50 MG/1
TABLET, FILM COATED ORAL
COMMUNITY
Start: 2022-01-01

## 2022-01-01 RX ORDER — GABAPENTIN 100 MG/1
CAPSULE ORAL
Qty: 270 CAPSULE | Refills: 0 | Status: SHIPPED | OUTPATIENT
Start: 2022-01-01

## 2022-01-01 RX ORDER — NITROGLYCERIN 0.4 MG/1
0.4 TABLET SUBLINGUAL ONCE AS NEEDED
Status: CANCELLED | OUTPATIENT
Start: 2022-01-01

## 2022-01-01 RX ORDER — FERROUS SULFATE 325(65) MG
1 TABLET ORAL
COMMUNITY
Start: 2022-01-01

## 2022-01-01 RX ORDER — LISINOPRIL 10 MG/1
10 TABLET ORAL DAILY
Qty: 30 TABLET | Refills: 0 | Status: SHIPPED | OUTPATIENT
Start: 2022-01-01 | End: 2022-07-06

## 2022-01-01 RX ORDER — INSULIN ASPART 100 [IU]/ML
12 INJECTION, SOLUTION INTRAVENOUS; SUBCUTANEOUS
Qty: 15 ML | Refills: 5 | Status: SHIPPED | OUTPATIENT
Start: 2022-01-01 | End: 2022-01-01 | Stop reason: SDUPTHER

## 2022-01-01 RX ORDER — TIOTROPIUM BROMIDE AND OLODATEROL 3.124; 2.736 UG/1; UG/1
SPRAY, METERED RESPIRATORY (INHALATION)
Qty: 4 G | Refills: 3 | Status: SHIPPED | OUTPATIENT
Start: 2022-01-01 | End: 2022-01-01

## 2022-01-01 RX ORDER — METOPROLOL TARTRATE 50 MG/1
50 TABLET, FILM COATED ORAL 2 TIMES DAILY
COMMUNITY
Start: 2022-01-01

## 2022-01-01 RX ORDER — OXYCODONE HYDROCHLORIDE 10 MG/1
TABLET ORAL
COMMUNITY
Start: 2022-01-01

## 2022-01-01 RX ORDER — NEOMYCIN SULFATE, POLYMYXIN B SULFATE, AND DEXAMETHASONE 3.5; 10000; 1 MG/G; [USP'U]/G; MG/G
OINTMENT OPHTHALMIC
COMMUNITY
Start: 2022-01-01

## 2022-01-01 RX ORDER — LIDOCAINE 50 MG/G
2 PATCH TOPICAL DAILY
Qty: 30 PATCH | Refills: 3 | Status: SHIPPED | OUTPATIENT
Start: 2022-01-01

## 2022-01-01 RX ORDER — FENTANYL 12 UG/H
PATCH TRANSDERMAL
COMMUNITY
Start: 2022-01-01 | End: 2022-01-01

## 2022-01-03 NOTE — TELEPHONE ENCOUNTER
Patient's son, Hugh Helm, called  The home nurse was there today  She took Severiano's blood pressure and the readings were: 181/65, 198/77, and 203/77  The patient claims to be asymptomatic aside from some fatigue  Her blood sugars were 99, 138, 205 (after insulin given), and 183  The patient's son also is inquiring about skilled nursing coming back in to the home on a weekly basis  He also is inquiring about a dietician coming out on a daily basis? I told him we could potentially put the referrals in, but no guarantee insurance will cover  Please advise on all fronts, and then I'll give the son a call back  Thanks!

## 2022-01-03 NOTE — TELEPHONE ENCOUNTER
We believe this form is on Dr Caicedo Panaca desk  Once we receive the signed form back, we will fax over to the incontinence supply company  Thanks!

## 2022-01-03 NOTE — TELEPHONE ENCOUNTER
Ban    Have been trying to get incontinence supplies and has sent a form once or twice    Please give to dr to sign when/if received

## 2022-01-04 NOTE — PROGRESS NOTES
In [employer] received from [place], [place] requesting assistance with home health services for patient  Patient's son called the office and is requesting that Skilled Nursing in home services be resumed  He also reports that PT and OT did not reach out to patient  Telephone call placed to patient's son Christian Robins and he informed me that patient was discharged from [facility] last week  He reports that patient's blood pressure has not been stable and her medication are being adjusted  He also reports that patient's blood sugars are also not good and he wants information regarding diet recommendations  Christianlisha Robins is requesting that a dietician come to the home and provide a written dietary plan that he can follow  Christian Robins informed me that patient is still getting 24/hr care through the Waiver Program     Telephone call placed to [facility] and I received the answering service and she took my information  I received a call back from Thalia and she requests that new orders, Demographics with insurance information and an H&P are faxed to Fax# [phone]    I sent an in Basket message to [facility] Internal Medicine Clinical Team with this information  I also requested that a referral be placed for Dietary  I called back patient's son, Doc Camacho and informed him of this  I informed Christian Robins that patient's "[employer]" insurance will need to authorize home health services for patient  Christianlisha Mitchellon denies having any other needs that I can assist him with at this time  Christian Robins agrees to contact me if he needs any further assistance

## 2022-01-04 NOTE — TELEPHONE ENCOUNTER
Patient son called back informed of Dr Greenberg message  He stated skilled nursing ended last week, he would like to know if you can contact him in regards to this  PT and OT hasn't reached out to them yet

## 2022-01-05 NOTE — TELEPHONE ENCOUNTER
----- Message from Elver Silva sent at 1/4/2022 10:42 AM EST -----  Hello,    Patient's son is requesting that home health be resumed  She was just closed with [facility]  I called them and they are requesting new orders for SN and PT/OT  They are also asking for demographics with insurance information, and H&P    Please fax to [phone]    Also, patient's son would like a referral to Dietary  Please put in a referral for this        Thank you,  Barrett Georges

## 2022-01-06 NOTE — TELEPHONE ENCOUNTER
Yes, easiest would be to hold the Novolog if sugar is below 150  For the blood pressure, unless her cardiologist has a different plan, would double the lisinopril to 10 mg daily

## 2022-01-06 NOTE — TELEPHONE ENCOUNTER
----- Message from Emerson Dayanna sent at 1/4/2022 10:42 AM EST -----  Hello,    Patient's son is requesting that home health be resumed  She was just closed with [facility]  I called them and they are requesting new orders for SN and PT/OT  They are also asking for demographics with insurance information, and H&P    Please fax to [phone]    Also, patient's son would like a referral to Dietary  Please put in a referral for this        Thank you,  Raul Osman

## 2022-01-06 NOTE — TELEPHONE ENCOUNTER
Noted  Updated referral was placed and faxed over to [facility]  Referral was also placed in the system for nutrition services  An ambulatory referral for PT will be placed  [facility] was looking for additional office visit notes indicating her issues with mobility  I faxed this over earlier today

## 2022-01-06 NOTE — TELEPHONE ENCOUNTER
I spoke with the patient's son, Karlee Crespo, and the one home health nurse, Meng Most  They advised me that the patient is currently on Novolog 12 units TID and Levemir 25 units QD (morning)  He is asking if we can change the parameters to hold the insulin from a blood sugar of 100-120 to a blood sugar of 100-150  He is scared of her "bottoming out"  Here is a list of readings I was given: 6PM last night (59), 7:30PM last night (124) with 12 units given, 10:30PM last night (167), 1AM this morning (142), 8:15AM this morning (125) no insulin given, 10:30AM this morning (184) gave 12 units of insulin, 11:15AM this morning (276), 12:55PM this afternoon (228) 12 units of insulin, and 3:00PM this afternoon (135) no insulin  For her blood pressure, they just started her on Metoprolol 50MG BID and the Lisinopril is still 5MG QD  Here are the following pressure readings: 192/70 (both yesterday AM and PM), 140/80 11PM yesterday, 130/74 2:30AM today, 152/70 8:15AM today, 186/63 10:30AM today, and 170/70 12:40PM today  Any recommendations? She does have an appointment scheduled with the dietician this upcoming Tuesday, 01/11/22

## 2022-01-06 NOTE — PROGRESS NOTES
Telephone call received from patient's son, Sudha Vasquez  He informed me that he was never contacted by Home Health  However, he prefers that patient go to out patient PT  I informed Saima Villanueva that I would request an order for out patient PT  He also reports that patient never received the w/c that was ordered and he is having a hard time getting through to the office  In addition, he informed me that patient needs a gel overlay for there hospital bed  I informed Saima Villanueva that I will check on his request with the office staff  I spoke to Cachorro Antonio and  the order was placed for out patient PT  The order was also sent for the w/c and a gel overlay to [facility] but they are asking for office notes  Office notes were faxed to [facility] as requested  Telephone call placed to Saima Villanueva and I informed him of above  Saima Villanueva informed me that patient has an appointment with Nutrition on Tuesday, 1/11  He also changed patient's appointment with Dr Verner Lute and new appointment is on 1/14  He was very appreciative of my assistance and denies having any additional needs at this time

## 2022-01-06 NOTE — TELEPHONE ENCOUNTER
Patients son is stating [facility] Fang did not receive order for the wheelchair and the gel bed overlay  [employer] is waiting for the script for diapers, bed pads, and gloves  Fax orders to [facility] at  #[phone]  He did not have [address] fax #  Son states the cardiologist did not recommend increasing the lisinopril from 5 to 10  But he did increase the metoprolol tartrate to 50  He will be calling back with sugar levels as they are still off       Call back #[phone]  Hang/son

## 2022-01-10 NOTE — TELEPHONE ENCOUNTER
Son left a voicemail saying a company [employer] has been faxing to us for her incontinence supplies,  They need it signed and faxed back soon please if anyone has seen this come through please

## 2022-01-12 NOTE — PROGRESS NOTES
[facility]  coding opportunities          Number of diagnosis code(s) already on the problem list added to FYI flag: 3     Chart Reviewed * (Number of) Inbasket suggestions sent to Provider: 2            Number of suggestions used: 2      Number of suggestions NOT actually used: 3     Patients insurance company: Merged with [facility]     Visit status: Patient arrived for their scheduled appointment        [place] [address]  coding opportunities    DX: E11 69, Z79 4 Type 2 diabetes mellitus with other specified complication, with long-term current use of insulin  Assessed on 11/8/2021    DX: J44 9 COPD (chronic obstructive pulmonary disease)- on problem list  DX: I48 0 Chronic atrial fibrillation- on problem list  DX: PAD (peripheral artery disease)- on problem list    If this is correct, please document and assess at your next visit on 1/13/2022          Number of diagnosis code(s) already on the problem list added to FYI flag: 3     Chart Reviewed * (Number of) Inbasket suggestions sent to Provider: 2                  Patients insurance company: Merged with [facility]

## 2022-01-13 NOTE — TELEPHONE ENCOUNTER
Called for clarification, Mary Kate Cardenas stated that she put her 02 on at 2 L and her oxygen increased to 90%  Patient is denying any SOB or chest pain  She did state that patient has an increase of mucus and is sneezing a lot, she is supposed to come into the off for an appointment tomorrow  Please advise

## 2022-01-13 NOTE — TELEPHONE ENCOUNTER
If she develops any shortness of breath or wheezing or chest pain, she needs to be seen somewhere  Also have them bring her oxygen sensor with her tomorrow so we can double check it

## 2022-01-13 NOTE — TELEPHONE ENCOUNTER
Pt has appt rody here fyi but    Oxygen level was at 82 today at 1:47  At rest     Not complaining of sob or anything,  Is sneezzing a bit w/ minor congestion      fyi Has pain in her feet is still ongoing,  Is taking gabapentin med as per vna    William [address]

## 2022-01-14 NOTE — TELEPHONE ENCOUNTER
If numbers stay that way after cold improves, we could have her take 5 mg of the lisinopril twice a day

## 2022-01-14 NOTE — PROGRESS NOTES
Assessment/Plan:      will adjust her long-acting insulin slightly  Monitor sugar and avoid low 1's  Continue current medications otherwise  Continue follow-up with Cardiology  For lingering URI symptoms over 2 weeks, will treat with antibiotics at this point given her comorbidities  But also will COVID test just to make sure  She is not considered contagious at this point if it were to be positive  She continues to require incontinence supplies because of her urinary incontinence  Quality Measures:       Return in about 6 weeks (around 2/25/2022) for Recheck  No problem-specific Assessment & Plan notes found for this encounter  Diagnoses and all orders for this visit:    Type 2 diabetes mellitus with other specified complication, with long-term current use of insulin ([facility])  -     insulin detemir (Levemir FlexTouch) 100 Units/mL injection pen; Inject 28 Units under the skin daily    Essential hypertension    Upper respiratory tract infection, unspecified type  -     azithromycin (ZITHROMAX) 250 mg tablet; 2 tabs today then 1 tab daily for 4 days    Urinary incontinence in female    Other orders  -     metoprolol tartrate (LOPRESSOR) 50 mg tablet; Take 50 mg by mouth 2 (two) times a day          Subjective:      Patient ID: Argelia Soni is a 79 y o  female  Patient comes in today for follow-up with her son  Sugars are doing a little better  There are now hold parameters for her short-acting  That has helped avoid any low sugars  Her blood pressure is doing better with the change in medicine  Today, she is complaining of lingering cold symptoms for over 2 weeks now  Head congestion  No chest congestion or shortness of breath  The aide had called yesterday that her O2 level was low but she felt okay  Her O2 sat here is much better  This is without oxygen        ALLERGIES:  Allergies   Allergen Reactions    Calcipotriene Hives    Lactose - Food Allergy GI Intolerance    Penicillin V Hives     Category:  Allergy;     Propoxyphene Rash       CURRENT MEDICATIONS:    Current Outpatient Medications:     acetaminophen (TYLENOL) 325 mg tablet, Take 975 mg by mouth every 8 (eight) hours, Disp: , Rfl:     albuterol (PROVENTIL HFA,VENTOLIN HFA) 90 mcg/act inhaler, Inhale 2 puffs every 4 (four) hours as needed, Disp: , Rfl:     Alcohol Swabs (Pharmacist Choice Alcohol) PADS, 2 (two) times a day Use to test , Disp: , Rfl:     ALPRAZolam (XANAX) 0 25 mg tablet, Take 1 tablet (0 25 mg total) by mouth daily as needed for anxiety, Disp: 7 tablet, Rfl: 0    BD Pen Needle Prabha U/F 32G X 4 MM MISC, USE ONCE DAILY AS DIRECTED BY DOCTOR, Disp: 100 each, Rfl: 5    Blood Glucose Monitoring Suppl (ONE TOUCH ULTRA 2) w/Device KIT, 2 (two) times a day Use to test , Disp: , Rfl:     Continuous Blood Gluc  (FreeStyle Afsaneh [place]) MAURY, Use 1 Units 4 (four) times a day, Disp: 1 each, Rfl: 6    Continuous Blood Gluc Sensor (FreeStyle Afsaneh 14 Day Sensor) MISC, Use 1 each 4 (four) times a day, Disp: 1 each, Rfl: 6    Docusate Sodium (DSS) 100 MG CAPS, Take 100 mg by mouth 2 (two) times a day  , Disp: , Rfl:     DULoxetine (CYMBALTA) 60 mg delayed release capsule, Take 1 capsule (60 mg total) by mouth 2 (two) times a day, Disp: 60 capsule, Rfl: 3    escitalopram (LEXAPRO) 5 mg tablet, Take 1 tablet (5 mg total) by mouth daily for 14 days, Disp: 14 tablet, Rfl: 0    gabapentin (NEURONTIN) 100 mg capsule, TAKE ONE(1) CAPSULE BY MOUTH THREE TIMES A DAY, Disp: 270 capsule, Rfl: 1    insulin aspart (NovoLOG FlexPen) 100 UNIT/ML injection pen, Inject 12 Units under the skin 3 (three) times a day with meals, Disp: 15 mL, Rfl: 5    insulin detemir (Levemir FlexTouch) 100 Units/mL injection pen, Inject 28 Units under the skin daily, Disp: 15 mL, Rfl: 5    insulin lispro (HumaLOG) 100 units/mL injection, Inject 5 Units under the skin 3 (three) times a day with meals, Disp: 15 mL, Rfl: 3    Lancet Devices (Easy Mini Eject Lancing Device) MISC, 2 (two) times a day Use to test, Disp: , Rfl:     Lancets (ONETOUCH ULTRASOFT) lancets, Check sugar twice a day, Disp: 100 each, Rfl: 5    lidocaine (LIDODERM) 5 %, Apply 2 patches topically daily Remove & Discard patch within 12 hours or as directed by MD  Apply to Left hip area and Right foot, Disp: 30 patch, Rfl: 0    lisinopril (ZESTRIL) 5 mg tablet, Take 1 tablet (5 mg total) by mouth daily, Disp: 30 tablet, Rfl: 5    Multiple Vitamins-Minerals (multivitamin with minerals) tablet, Take 1 tablet by mouth daily, Disp: , Rfl:     nicotine (NICODERM CQ) 14 mg/24hr TD 24 hr patch, Place 1 patch on the skin every 24 hours, Disp: , Rfl:     nitroglycerin (NITROSTAT) 0 4 mg SL tablet, Place 1 tablet under the tongue once as needed  = may repeat every 5 minutes x 2 doses, Disp: , Rfl:     OneTouch Ultra test strip, use 1 TEST STRIP to TEST BLOOD SUGAR twice a day, Disp: 100 each, Rfl: 3    pantoprazole (PROTONIX) 40 mg tablet, Take 1 tablet (40 mg total) by mouth 2 (two) times a day, Disp: 28 tablet, Rfl: 0    Protein (PROSOURCE NO CARB PO), Take 30 mL by mouth 2 (two) times a day, Disp: , Rfl:     simvastatin (ZOCOR) 40 mg tablet, TAKE 1 TABLET (40 MG TOTAL) BY MOUTH DAILY, Disp: 90 tablet, Rfl: 3    Stiolto Respimat 2 5-2 5 MCG/ACT inhaler, Inhale 2 puffs daily, Disp: 4 g, Rfl: 3    traMADol (ULTRAM) 50 mg tablet, Take 1 tablet (50 mg total) by mouth every 8 (eight) hours as needed for moderate pain or severe pain, Disp: 20 tablet, Rfl: 0    azithromycin (ZITHROMAX) 250 mg tablet, 2 tabs today then 1 tab daily for 4 days, Disp: 6 tablet, Rfl: 0    metoprolol tartrate (LOPRESSOR) 50 mg tablet, Take 50 mg by mouth 2 (two) times a day, Disp: , Rfl:     ACTIVE PROBLEM LIST:  Patient Active Problem List   Diagnosis    Allergic rhinitis    Atherosclerosis of native artery of both lower extremities with intermittent claudication (HCC)    Carotid stenosis, bilateral    Carpal tunnel syndrome on both sides    Anxiety and depression    Type 2 diabetes mellitus, with long-term current use of insulin (HCC)    Diabetic peripheral neuropathy (HCC)    Hyperlipidemia    Essential hypertension    Mesenteric artery stenosis (HCC)    Seborrheic keratosis    Skin lesion    Vitamin D deficiency    Cerebral infarction (HCC)    Peripheral neuropathy    Trigger little finger of right hand    Urinary incontinence in female    Vertigo    Degenerative joint disease (DJD) of lumbar spine    Pain of right hip joint    Gastrointestinal hemorrhage associated with gastric ulcer    Acute on chronic blood loss anemia    Chronic atrial fibrillation (HCC)    CAD (coronary artery disease) s/p CABG    Pacemaker    Elevated troponin    Abnormal CT of the chest    GI bleed    Closed intertrochanteric fracture of left femur (HCC)    Ambulatory dysfunction    COPD [facility]    Acute kidney injury (HCC)    Hyponatremia    Hyperkalemia    Femoral-popliteal bypass graft occlusion, right, subsequent encounter    Acute encephalopathy    PAD (peripheral artery disease) ([facility]    Urinary tract infection    Pressure injury of left heel, stage 1    Pressure injury of buttock, stage 1    Activity intolerance    Surgical wound present    Pressure injury of buttock, stage 2 (HCC)    Pressure injury of sacral region, stage 2 (HCC)    Deep tissue injury    Traumatic skin ulcer, limited to breakdown of skin ([facility] )    Debility       PAST MEDICAL HISTORY:  Past Medical History:   Diagnosis Date    Anxiety     Chronic atrial fibrillation (HCC)     Colonic polyp     COPD (chronic obstructive pulmonary disease) ([facility] )     Diabetes mellitus type 2 in nonobese ([facility] )     Diverticulosis     large intestine without hemmorhage    Gastric ulcer     Heart failure (HCC)     Hyperlipidemia     Hypertension     Iron deficiency anemia     Major depressive disorder     Peripheral vascular disease (HCC)     T12 compression fracture ([address] ) 3/1/2019       PAST SURGICAL HISTORY:  Past Surgical History:   Procedure Laterality Date    CAROTID ENDARTERECTOMY      CORONARY ANGIOPLASTY WITH STENT PLACEMENT      leg and chest    CORONARY ANGIOPLASTY WITH STENT PLACEMENT      adominal stent    CORONARY ARTERY BYPASS GRAFT      triple artery    HYSTERECTOMY      [place] OPEN RX FEMUR FX+INTRAMED DEMETRIO Left 2021    Procedure: INSERTION NAIL IM FEMUR ANTEGRADE (TROCHANTERIC)- LEFT;  Surgeon: Sarah Chavez DO;  Location: [facility];  Service: Orthopedics    TONSILLECTOMY      VASCULAR SURGERY         FAMILY HISTORY:  Family History   Problem Relation Age of Onset    Lung cancer Mother     Migraines Mother     Stroke Brother     No Known Problems Father     No Known Problems Maternal Grandmother     No Known Problems Paternal Grandmother     No Known Problems Maternal Aunt     No Known Problems Maternal Aunt     No Known Problems Maternal Aunt     No Known Problems Maternal Aunt     No Known Problems Maternal Aunt     No Known Problems Maternal Aunt     Breast cancer Neg Hx        SOCIAL HISTORY:  Social History     Socioeconomic History    Marital status:      Spouse name: Not on file    Number of children: Not on file    Years of education: Not on file    Highest education level: Not on file   Occupational History     Comment: retired   Tobacco Use    Smoking status: Former Smoker     Quit date: 2021     Years since quittin 6    Smokeless tobacco: Never Used   Vaping Use    Vaping Use: Never used   Substance and Sexual Activity    Alcohol use:  Yes     Alcohol/week: 2 0 standard drinks     Types: 2 Glasses of wine per week    Drug use: No    Sexual activity: Not Currently     Partners: Male   Other Topics Concern    Not on file   Social History Narrative    Not on file     Social Determinants of Health     Financial Resource Strain: Not on file   Food Insecurity: Not on file   Transportation Needs: No Transportation Needs    Lack of Transportation (Medical): No    Lack of Transportation (Non-Medical): No   Physical Activity: Not on file   Stress: Not on file   Social Connections: Not on file   Intimate Partner Violence: Not on file   Housing Stability: Not on file       Review of Systems   Respiratory: Negative for shortness of breath  Cardiovascular: Negative for chest pain  Gastrointestinal: Negative for abdominal pain  Objective:  Vitals:    01/14/22 1349   BP: 142/66   BP Location: Right arm   Patient Position: Sitting   Cuff Size: Adult   Pulse: 76   Temp: 97 5 °F (36 4 °C)   TempSrc: Tympanic   SpO2: 96%   Weight: 59 1 kg (130 lb 6 4 oz)   Height: 4' 11" (1 499 m)     Body mass index is 26 34 kg/m²  Physical Exam  Vitals and nursing note reviewed  Constitutional:       Appearance: She is well-developed  Cardiovascular:      Rate and Rhythm: Normal rate and regular rhythm  Heart sounds: Normal heart sounds  Pulmonary:      Effort: Pulmonary effort is normal       Breath sounds: Normal breath sounds  Abdominal:      Palpations: Abdomen is soft  Tenderness: There is no abdominal tenderness  Neurological:      Mental Status: She is alert and oriented to person, place, and time  RESULTS:    No results found for this or any previous visit (from the past 1008 hour(s))  This note was created with voice recognition software  Phonic, grammatical and spelling errors may be present within the note as a result

## 2022-01-14 NOTE — TELEPHONE ENCOUNTER
5 days of bp     1/10  /86    Pulse 59     Even 183/63    Pulse 59       1/11   EVENING  165/70   60     1/12   159/86     Pulse 60     1/13     AM   166/77    Pulse   60      NICK   132/68    Pulse   82        1/14    150/69    Pulse 62   12 am     153/65 pulse 61  10 am

## 2022-01-19 NOTE — TELEPHONE ENCOUNTER
Continue with the lisinopril at 5 mg twice a day  Call blood pressure readings on Friday  Some of those sugars are little low  Would go back to the 25 units of the long-acting insulin and follow the same plan with the short-acting insulin before meals, holding if sugar is below 150

## 2022-01-19 NOTE — TELEPHONE ENCOUNTER
Received a call from Saima Villanueva and the home health nurse  They wanted to inform you that she's been mild to moderate hypertensive -- Her blood pressure readings have been as followed: 150/65, 155/70, 173/73, 165/69  Yesterday her blood sugar readings were the following ranges: 289, 69, 77, 73, 280, 121, 60, 81, 83  They are still holding insulin if her readings are below 150  FYI; Thanks!

## 2022-01-31 NOTE — TELEPHONE ENCOUNTER
Patient's son Regina Monteiro called he states his mom has been having nose bleeds about 4x/week, started this past week  He stated he got a call this morning from the aide that Amaris Palomares woke up with a nose bleed, per Amaris Palomares she said it was a "glob" that came out, placed her head back and about 15-20 minutes later it stopped  Her sugar this morning was 287, bp was 199/71, heart rate 60, O2 93  She did not sleep with oxygen last night  She was scheduled to see you this week, her son is asking if there is anything they should do, he is trying to avoid going to the ER

## 2022-01-31 NOTE — TELEPHONE ENCOUNTER
Spoke to patient's son, Josh Crowe, he verbalized understanding regarding getting a saline nasal spray   He would like to keep appt for tomorrow and discuss further the ENT referral

## 2022-01-31 NOTE — TELEPHONE ENCOUNTER
She can try saline nasal spray  If not actively bleeding, can see an ENT specialist, Dr Belem Bejarano or Dr Alejandra Christianson, but if nosebleed is active, they will not see the patient and recommend ER

## 2022-02-01 NOTE — PROGRESS NOTES
Assessment/Plan:      place a referral for ENT  Continue the saline nasal spray  Will order a bubbler for her nighttime oxygen  Reviewed humidity in the apartment with her and her son  It looks like her home blood pressure machine runs very high  Will monitor  Quality Measures:       Return for Recheck  No problem-specific Assessment & Plan notes found for this encounter  Diagnoses and all orders for this visit:    Epistaxis  -     CBC and differential; Future  -     Ambulatory Referral to Otolaryngology; Future    Essential hypertension    Other orders  -     neomycin-polymyxin-dexamethasone (MAXITROL) 0 35%-10,000 units/g-0 1%; APPLY 1/2 RIBBIN TO AFFECTED EYES TWICE DAILY AS DIRECTED AFTER CLEANING LIDS          Subjective:      Patient ID: Vanessa Chan is a 79 y o  female  Patient comes in today with her son and her aide because she has been having frequent nose bleeds at home  She is not on a blood thinner  She does live in senior housing  Tends to sleep with the window open at night because she feels it is too hot in the apartment  She does wear her oxygen at night  They also brought in her home blood pressure machine to compare  ALLERGIES:  Allergies   Allergen Reactions    Calcipotriene Hives    Lactose - Food Allergy GI Intolerance    Penicillin V Hives     Category:  Allergy;     Propoxyphene Rash       CURRENT MEDICATIONS:    Current Outpatient Medications:     acetaminophen (TYLENOL) 325 mg tablet, Take 975 mg by mouth every 8 (eight) hours, Disp: , Rfl:     albuterol (PROVENTIL HFA,VENTOLIN HFA) 90 mcg/act inhaler, Inhale 2 puffs every 4 (four) hours as needed, Disp: , Rfl:     Alcohol Swabs (Pharmacist Choice Alcohol) PADS, 2 (two) times a day Use to test , Disp: , Rfl:     ALPRAZolam (XANAX) 0 25 mg tablet, Take 1 tablet (0 25 mg total) by mouth daily as needed for anxiety, Disp: 7 tablet, Rfl: 0    BD Pen Needle Prabha U/F 32G X 4 MM MISC, USE ONCE DAILY AS I called [facility] and left a message with Jess Aiken (the  says Jess Aiken does all refill requests) DIRECTED BY DOCTOR, Disp: 100 each, Rfl: 5    Blood Glucose Monitoring Suppl (ONE TOUCH ULTRA 2) w/Device KIT, 2 (two) times a day Use to test , Disp: , Rfl:     Continuous Blood Gluc  (FreeStyle Afsaneh [place]) [facility], Use 1 Units 4 (four) times a day, Disp: 1 each, Rfl: 6    Continuous Blood Gluc Sensor (FreeStyle Afsaneh 14 Day Sensor) MISC, Use 1 each 4 (four) times a day, Disp: 1 each, Rfl: 6    Docusate Sodium (DSS) 100 MG CAPS, Take 100 mg by mouth 2 (two) times a day  , Disp: , Rfl:     DULoxetine (CYMBALTA) 60 mg delayed release capsule, Take 1 capsule (60 mg total) by mouth daily, Disp: 30 capsule, Rfl: 3    escitalopram (LEXAPRO) 10 mg tablet, Take 1 tablet (10 mg total) by mouth daily for 14 days, Disp: 30 tablet, Rfl: 3    gabapentin (NEURONTIN) 100 mg capsule, TAKE ONE(1) CAPSULE BY MOUTH THREE TIMES A DAY, Disp: 270 capsule, Rfl: 1    insulin aspart (NovoLOG FlexPen) 100 UNIT/ML injection pen, Inject 12 Units under the skin 3 (three) times a day with meals, Disp: 15 mL, Rfl: 5    insulin detemir (Levemir FlexTouch) 100 Units/mL injection pen, Inject 28 Units under the skin daily, Disp: 15 mL, Rfl: 5    insulin lispro (HumaLOG) 100 units/mL injection, Inject 5 Units under the skin 3 (three) times a day with meals, Disp: 15 mL, Rfl: 3    Lancet Devices (Easy Mini Eject Lancing Device) MISC, 2 (two) times a day Use to test, Disp: , Rfl:     Lancets (ONETOUCH ULTRASOFT) lancets, Check sugar twice a day, Disp: 100 each, Rfl: 5    lidocaine (LIDODERM) 5 %, Apply 2 patches topically daily Remove & Discard patch within 12 hours or as directed by MD  Apply to Left hip area and Right foot, Disp: 30 patch, Rfl: 0    lisinopril (ZESTRIL) 5 mg tablet, Take 1 tablet (5 mg total) by mouth 2 (two) times a day, Disp: 60 tablet, Rfl: 5    metoprolol tartrate (LOPRESSOR) 50 mg tablet, Take 50 mg by mouth 2 (two) times a day, Disp: , Rfl:     Multiple Vitamins-Minerals (multivitamin with minerals) tablet, Take 1 tablet by mouth daily, Disp: , Rfl:     neomycin-polymyxin-dexamethasone (MAXITROL) 0 35%-10,000 units/g-0 1%, APPLY 1/2 RIBBIN TO AFFECTED EYES TWICE DAILY AS DIRECTED AFTER CLEANING LIDS, Disp: , Rfl:     nicotine (NICODERM CQ) 14 mg/24hr TD 24 hr patch, Place 1 patch on the skin every 24 hours, Disp: , Rfl:     nitroglycerin (NITROSTAT) 0 4 mg SL tablet, Place 1 tablet under the tongue once as needed  = may repeat every 5 minutes x 2 doses, Disp: , Rfl:     OneTouch Ultra test strip, use 1 TEST STRIP to TEST BLOOD SUGAR twice a day, Disp: 100 each, Rfl: 3    pantoprazole (PROTONIX) 40 mg tablet, Take 1 tablet (40 mg total) by mouth 2 (two) times a day, Disp: 28 tablet, Rfl: 0    Protein (PROSOURCE NO CARB PO), Take 30 mL by mouth 2 (two) times a day, Disp: , Rfl:     simvastatin (ZOCOR) 40 mg tablet, TAKE 1 TABLET (40 MG TOTAL) BY MOUTH DAILY, Disp: 90 tablet, Rfl: 3    Stiolto Respimat 2 5-2 5 MCG/ACT inhaler, INHALE 2 PUFFS INTO THE LUNGS DAILY, Disp: 4 g, Rfl: 3    traMADol (ULTRAM) 50 mg tablet, Take 1 tablet (50 mg total) by mouth every 8 (eight) hours as needed for moderate pain or severe pain, Disp: 20 tablet, Rfl: 0    ACTIVE PROBLEM LIST:  Patient Active Problem List   Diagnosis    Allergic rhinitis    Atherosclerosis of native artery of both lower extremities with intermittent claudication (HCC)    Carotid stenosis, bilateral    Carpal tunnel syndrome on both sides    Anxiety and depression    Type 2 diabetes mellitus, with long-term current use of insulin (HCC)    Diabetic peripheral neuropathy (HCC)    Hyperlipidemia    Essential hypertension    Mesenteric artery stenosis (HCC)    Seborrheic keratosis    Skin lesion    Vitamin D deficiency    Cerebral infarction (HCC)    Peripheral neuropathy    Trigger little finger of right hand    Urinary incontinence in female    Vertigo    Degenerative joint disease (DJD) of lumbar spine    Pain of right hip joint    Gastrointestinal hemorrhage associated with gastric ulcer    Acute on chronic blood loss anemia    Chronic atrial fibrillation (HCC)    CAD (coronary artery disease) s/p CABG    Pacemaker    Elevated troponin    Abnormal CT of the chest    GI bleed    Closed intertrochanteric fracture of left femur (HCC)    Ambulatory dysfunction    COPD (chronic obstructive pulmonary disease) (HCC)    Acute kidney injury (HCC)    Hyponatremia    Hyperkalemia    Femoral-popliteal bypass graft occlusion, right, subsequent encounter    Acute encephalopathy    PAD (peripheral artery disease) ([facility])    Urinary tract infection    Pressure injury of left heel, stage 1    Pressure injury of buttock, stage 1    Activity intolerance    Surgical wound present    Pressure injury of buttock, stage 2 (HCC)    Pressure injury of sacral region, stage 2 (HCC)    Deep tissue injury    Traumatic skin ulcer, limited to breakdown of skin ([address] )    Debility       PAST MEDICAL HISTORY:  Past Medical History:   Diagnosis Date    Anxiety     Chronic atrial fibrillation (HCC)     Colonic polyp     COPD (chronic obstructive pulmonary disease) ([address]     Diabetes mellitus type 2 in nonobese ([facility])     Diverticulosis     large intestine without hemmorhage    Gastric ulcer     Heart failure ([facility])     Hyperlipidemia     Hypertension     Iron deficiency anemia     Major depressive disorder     Peripheral vascular disease ([facility] [address] )     T12 compression fracture ([address] ) 3/1/2019       PAST SURGICAL HISTORY:  Past Surgical History:   Procedure Laterality Date    CAROTID ENDARTERECTOMY      CORONARY ANGIOPLASTY WITH STENT PLACEMENT      leg and chest    CORONARY ANGIOPLASTY WITH STENT PLACEMENT      adominal stent    CORONARY ARTERY BYPASS GRAFT      triple artery    HYSTERECTOMY      NM OPEN RX FEMUR FX+INTRAMED DEMETRIO Left 9/29/2021    Procedure: INSERTION NAIL IM FEMUR ANTEGRADE (TROCHANTERIC)- LEFT;  Surgeon: Dago Campos DO; Location: [facility];  Service: Orthopedics    TONSILLECTOMY      VASCULAR SURGERY         FAMILY HISTORY:  Family History   Problem Relation Age of Onset    Lung cancer Mother     Migraines Mother     Stroke Brother     No Known Problems Father     No Known Problems Maternal Grandmother     No Known Problems Paternal Grandmother     No Known Problems Maternal Aunt     No Known Problems Maternal Aunt     No Known Problems Maternal Aunt     No Known Problems Maternal Aunt     No Known Problems Maternal Aunt     No Known Problems Maternal Aunt     Breast cancer Neg Hx        SOCIAL HISTORY:  Social History     Socioeconomic History    Marital status:      Spouse name: Not on file    Number of children: Not on file    Years of education: Not on file    Highest education level: Not on file   Occupational History     Comment: retired   Tobacco Use    Smoking status: Former Smoker     Quit date: 2021     Years since quittin 7    Smokeless tobacco: Never Used   Vaping Use    Vaping Use: Never used   Substance and Sexual Activity    Alcohol use: Yes     Alcohol/week: 2 0 standard drinks     Types: 2 Glasses of wine per week    Drug use: No    Sexual activity: Not Currently     Partners: Male   Other Topics Concern    Not on file   Social History Narrative    Not on file     Social Determinants of Health     Financial Resource Strain: Not on file   Food Insecurity: Not on file   Transportation Needs: No Transportation Needs    Lack of Transportation (Medical): No    Lack of Transportation (Non-Medical): No   Physical Activity: Not on file   Stress: Not on file   Social Connections: Not on file   Intimate Partner Violence: Not on file   Housing Stability: Not on file       Review of Systems   Respiratory: Negative for shortness of breath  Cardiovascular: Negative for chest pain  Gastrointestinal: Negative for abdominal pain           Objective:  Vitals:    22 1516 02/01/22 1527   BP: 162/68 (!) 189/66   BP Location: Right arm Right arm   Patient Position: Sitting Sitting   Cuff Size: Adult Adult   Pulse: 63    Resp: 16    Temp: 98 7 °F (37 1 °C)    TempSrc: Tympanic    SpO2: 99%    Weight: 60 7 kg (133 lb 12 8 oz)    Height: 4' 11" (1 499 m)      Body mass index is 27 02 kg/m²  Physical Exam  Vitals and nursing note reviewed  Constitutional:       Appearance: Normal appearance  She is well-developed  Cardiovascular:      Rate and Rhythm: Normal rate and regular rhythm  Heart sounds: Normal heart sounds  Pulmonary:      Effort: Pulmonary effort is normal       Breath sounds: Normal breath sounds  Neurological:      Mental Status: She is alert  RESULTS:    Recent Results (from the past 1008 hour(s))   COVID Only- Office Collect    Collection Time: 01/14/22  2:40 PM    Specimen: Nose; Nares   Result Value Ref Range    SARS-CoV-2 Negative Negative       This note was created with voice recognition software  Phonic, grammatical and spelling errors may be present within the note as a result

## 2022-02-10 NOTE — TELEPHONE ENCOUNTER
cici pt to schedule appt from referral  Spoke to son, he will call back to schedule appt   I spoke to dr Trinh Clark pt was seen at the hospital by dr Kenna Howell and per dr Trinh Clark pt can follow up with julio sweeney and a bmp before the appt

## 2022-02-11 NOTE — TELEPHONE ENCOUNTER
Patient's son, Luis M Crockett, stated Irlanda Renee was in the ER yesterday due to chest pain  In the ER patient was very agitated while there and refused to stay, the Physician treating her did want to admit patient due to abnormal results  Irlanda Renee was very upset and signed herself out AMA  They did notify Luis M Crockett that the chest xray did show a mass that has grown in size  Sierravillemariah Crockett was advised to call Cardiologist to also see if they may want to see her due to the ER visit and due to findings of the chest xray  He did state she is scheduled for a catherization next Thursday  I advised Luis M Crockett I would let you know what had happened  ER records are available in patient's chart

## 2022-02-11 NOTE — TELEPHONE ENCOUNTER
Records reviewed  Agree she should see cardiology  Lung mass being followed by Dr Nisha Flores but patient apparently wants nothing done

## 2022-02-14 NOTE — PROGRESS NOTES
Patient Name:  William Leiva  MRN:  [id number]    Assessment & Plan     1  S/P ORIF (open reduction internal fixation) fracture  -     XR femur 2 vw left; Future; Expected date: 02/14/2022  -     Ambulatory Referral to Physical Therapy; Future    2  Aftercare following surgery  -     Ambulatory Referral to Physical Therapy; Future    Singh Chong is a pleasant 79year old who presents to the office today 4 5 months s/p left hip TFN  X-rays were reviewed with the patient at today's visit which demonstrates stable orthopedic hardware with interval signs of healing  At this time I would like to initiate outpatient physical therapy  A script for physical therapy was provided to the patient at today's visit  She has been receiving home physical therapy  She may weight bear as tolerated  I will follow up with her in 2 months for a clinical re-evaluation and repeat left femur x-rays  She understood and had no further questions  History of the Present Illness   William Leiva is a 79 y o  female who presents to the office today 4 5 months s/p left hip TFN  She has been receiving home physical therapy  She has been weight-bearing at times with the use of a walker  She states that she will experience pain about her hip  She states that she will use Tylenol as needed to help alleviate her pain  She is scheduled to undergo a catheterization on 02/17/2022  Review of Systems     Review of Systems   Constitutional: Negative for chills, fever and unexpected weight change  HENT: Negative for hearing loss, nosebleeds and sore throat  Eyes: Negative for pain, redness and visual disturbance  Respiratory: Negative for cough, shortness of breath and wheezing  Cardiovascular: Negative for chest pain, palpitations and leg swelling  Gastrointestinal: Negative for abdominal pain, nausea and vomiting  Endocrine: Negative for polyphagia and polyuria  Genitourinary: Negative for dysuria and hematuria  Musculoskeletal: Negative for arthralgias, joint swelling and myalgias  Skin: Negative for rash and wound  Neurological: Negative for dizziness, numbness and headaches  Psychiatric/Behavioral: Negative for confusion and suicidal ideas  The patient is not nervous/anxious  Physical Exam     /83   Pulse 59   Ht 4' 11" (1 499 m)   BMI 27 02 kg/m²     Left Hip:  Well healed incision  No erythema, warmth or signs of infection  Non tender to palpation over greater tuberosity   Actively flex hip to 90 degrees  4-/5 quadriceps strength  Neurovascularly intact    Data Review     I have personally reviewed pertinent films in PACS, and my interpretation follows  X-ray's performed in the office today of her left femur demonstrates stable orthopedic hardware with maintained alinement  There is interval signs of healing  Social History     Tobacco Use    Smoking status: Former Smoker     Quit date: 2021     Years since quittin 7    Smokeless tobacco: Never Used   Vaping Use    Vaping Use: Never used   Substance Use Topics    Alcohol use:  Yes     Alcohol/week: 2 0 standard drinks     Types: 2 Glasses of wine per week    Drug use: No         Scribe Attestation    I,:  Amanda Keenan am acting as a scribe while in the presence of the attending physician :       I,:  Eliceo Mejía, DO personally performed the services described in this documentation    as scribed in my presence :

## 2022-02-16 NOTE — TELEPHONE ENCOUNTER
Patient's son called and said that they never brought the gel overlay for patient's hospital bed  He said that they have been waiting for this for awhile now  Ibis Ruiz wants to know if we could send in another order for it? Please advise    Annie Hartley

## 2022-02-23 NOTE — PROGRESS NOTES
[facility] [[email or web address]  coding opportunities        DX: E11 [id number], E11 42     Chart Reviewed * (Number of) Inbasket suggestions sent to Provider: 2                  Patients insurance company: [employer]

## 2022-03-02 NOTE — PATIENT INSTRUCTIONS
Medicare Preventive Visit Patient Instructions  Thank you for completing your Welcome to Medicare Visit or Medicare Annual Wellness Visit today  Your next wellness visit will be due in one year (3/3/2023)  The screening/preventive services that you may require over the next 5-10 years are detailed below  Some tests may not apply to you based off risk factors and/or age  Screening tests ordered at today's visit but not completed yet may show as past due  Also, please note that scanned in results may not display below  Preventive Screenings:  Service Recommendations Previous Testing/Comments   Colorectal Cancer Screening  * Colonoscopy    * Fecal Occult Blood Test (FOBT)/Fecal Immunochemical Test (FIT)  * Fecal DNA/Cologuard Test  * Flexible Sigmoidoscopy Age: 54-65 years old   Colonoscopy: every 10 years (may be performed more frequently if at higher risk)  OR  FOBT/FIT: every 1 year  OR  Cologuard: every 3 years  OR  Sigmoidoscopy: every 5 years  Screening may be recommended earlier than age 48 if at higher risk for colorectal cancer  Also, an individualized decision between you and your healthcare provider will decide whether screening between the ages of 74-80 would be appropriate  Colonoscopy: 08/13/2021  FOBT/FIT: Not on file  Cologuard: Not on file  Sigmoidoscopy: Not on file          Breast Cancer Screening Age: 36 years old  Frequency: every 1-2 years  Not required if history of left and right mastectomy Mammogram: 12/29/2021    Screening Current   Cervical Cancer Screening Between the ages of 21-29, pap smear recommended once every 3 years  Between the ages of 33-67, can perform pap smear with HPV co-testing every 5 years     Recommendations may differ for women with a history of total hysterectomy, cervical cancer, or abnormal pap smears in past  Pap Smear: Not on file    Screening Not Indicated   Hepatitis C Screening Once for adults born between 1945 and 1965  More frequently in patients at high risk for Hepatitis C Hep C Antibody: Not on file        Diabetes Screening 1-2 times per year if you're at risk for diabetes or have pre-diabetes Fasting glucose: 113 mg/dL   A1C: 6 6 %    Screening Not Indicated  History Diabetes   Cholesterol Screening Once every 5 years if you don't have a lipid disorder  May order more often based on risk factors  Lipid panel: 05/17/2021    Screening Not Indicated  History Lipid Disorder     Other Preventive Screenings Covered by Medicare:  1  Abdominal Aortic Aneurysm (AAA) Screening: covered once if your at risk  You're considered to be at risk if you have a family history of AAA  2  Lung Cancer Screening: covers low dose CT scan once per year if you meet all of the following conditions: (1) Age 50-69; (2) No signs or symptoms of lung cancer; (3) Current smoker or have quit smoking within the last 15 years; (4) You have a tobacco smoking history of at least 30 pack years (packs per day multiplied by number of years you smoked); (5) You get a written order from a healthcare provider  3  Glaucoma Screening: covered annually if you're considered high risk: (1) You have diabetes OR (2) Family history of glaucoma OR (3)  aged 48 and older OR (3)  American aged 72 and older  3  Osteoporosis Screening: covered every 2 years if you meet one of the following conditions: (1) You're estrogen deficient and at risk for osteoporosis based off medical history and other findings; (2) Have a vertebral abnormality; (3) On glucocorticoid therapy for more than 3 months; (4) Have primary hyperparathyroidism; (5) On osteoporosis medications and need to assess response to drug therapy  · Last bone density test (DXA Scan): Not on file  5  HIV Screening: covered annually if you're between the age of 12-76  Also covered annually if you are younger than 13 and older than 72 with risk factors for HIV infection   For pregnant patients, it is covered up to 3 times per pregnancy  Immunizations:  Immunization Recommendations   Influenza Vaccine Annual influenza vaccination during flu season is recommended for all persons aged >= 6 months who do not have contraindications   Pneumococcal Vaccine (Prevnar and Pneumovax)  * Prevnar = PCV13  * Pneumovax = PPSV23   Adults 25-60 years old: 1-3 doses may be recommended based on certain risk factors  Adults 72 years old: Prevnar (PCV13) vaccine recommended followed by Pneumovax (PPSV23) vaccine  If already received PPSV23 since turning 65, then PCV13 recommended at least one year after PPSV23 dose  Hepatitis B Vaccine 3 dose series if at intermediate or high risk (ex: diabetes, end stage renal disease, liver disease)   Tetanus (Td) Vaccine - COST NOT COVERED BY MEDICARE PART B Following completion of primary series, a booster dose should be given every 10 years to maintain immunity against tetanus  Td may also be given as tetanus wound prophylaxis  Tdap Vaccine - COST NOT COVERED BY MEDICARE PART B Recommended at least once for all adults  For pregnant patients, recommended with each pregnancy  Shingles Vaccine (Shingrix) - COST NOT COVERED BY MEDICARE PART B  2 shot series recommended in those aged 48 and above     Health Maintenance Due:      Topic Date Due    Breast Cancer Screening: Mammogram  12/29/2022     Immunizations Due:      Topic Date Due    DTaP,Tdap,and Td Vaccines (1 - Tdap) Never done    COVID-19 Vaccine (3 - Booster for Layla Anikaulich series) 07/17/2021     Advance Directives   What are advance directives? Advance directives are legal documents that state your wishes and plans for medical care  These plans are made ahead of time in case you lose your ability to make decisions for yourself  Advance directives can apply to any medical decision, such as the treatments you want, and if you want to donate organs  What are the types of advance directives?   There are many types of advance directives, and each state has rules about how to use them  You may choose a combination of any of the following:  · Living will: This is a written record of the treatment you want  You can also choose which treatments you do not want, which to limit, and which to stop at a certain time  This includes surgery, medicine, IV fluid, and tube feedings  · Durable power of  for healthcare [place] [facility]): This is a written record that states who you want to make healthcare choices for you when you are unable to make them for yourself  This person, called a proxy, is usually a family member or a friend  You may choose more than 1 proxy  · Do not resuscitate (DNR) order:  A DNR order is used in case your heart stops beating or you stop breathing  It is a request not to have certain forms of treatment, such as CPR  A DNR order may be included in other types of advance directives  · Medical directive: This covers the care that you want if you are in a coma, near death, or unable to make decisions for yourself  You can list the treatments you want for each condition  Treatment may include pain medicine, surgery, blood transfusions, dialysis, IV or tube feedings, and a ventilator (breathing machine)  · Values history: This document has questions about your views, beliefs, and how you feel and think about life  This information can help others choose the care that you would choose  Why are advance directives important? An advance directive helps you control your care  Although spoken wishes may be used, it is better to have your wishes written down  Spoken wishes can be misunderstood, or not followed  Treatments may be given even if you do not want them  An advance directive may make it easier for your family to make difficult choices about your care  Urinary Incontinence   Urinary incontinence (UI)  is when you lose control of your bladder  UI develops because your bladder cannot store or empty urine properly   The 3 most common types of UI are stress incontinence, urge incontinence, or both  Medicines:   · May be given to help strengthen your bladder control  Report any side effects of medication to your healthcare provider  Do pelvic muscle exercises often:  Your pelvic muscles help you stop urinating  Squeeze these muscles tight for 5 seconds, then relax for 5 seconds  Gradually work up to squeezing for 10 seconds  Do 3 sets of 15 repetitions a day, or as directed  This will help strengthen your pelvic muscles and improve bladder control  Train your bladder:  Go to the bathroom at set times, such as every 2 hours, even if you do not feel the urge to go  You can also try to hold your urine when you feel the urge to go  For example, hold your urine for 5 minutes when you feel the urge to go  As that becomes easier, hold your urine for 10 minutes  Self-care:   · Keep a UI record  Write down how often you leak urine and how much you leak  Make a note of what you were doing when you leaked urine  · Drink liquids as directed  You may need to limit the amount of liquid you drink to help control your urine leakage  Do not drink any liquid right before you go to bed  Limit or do not have drinks that contain caffeine or alcohol  · Prevent constipation  Eat a variety of high-fiber foods  Good examples are high-fiber cereals, beans, vegetables, and whole-grain breads  Walking is the best way to trigger your intestines to have a bowel movement  · Exercise regularly and maintain a healthy weight  Weight loss and exercise will decrease pressure on your bladder and help you control your leakage  · Use a catheter as directed  to help empty your bladder  A catheter is a tiny, plastic tube that is put into your bladder to drain your urine  · Go to behavior therapy as directed  Behavior therapy may be used to help you learn to control your urge to urinate      Weight Management   Why it is important to manage your weight:  Being overweight increases your risk of health conditions such as heart disease, high blood pressure, type 2 diabetes, and certain types of cancer  It can also increase your risk for osteoarthritis, sleep apnea, and other respiratory problems  Aim for a slow, steady weight loss  Even a small amount of weight loss can lower your risk of health problems  How to lose weight safely:  A safe and healthy way to lose weight is to eat fewer calories and get regular exercise  You can lose up about 1 pound a week by decreasing the number of calories you eat by 500 calories each day  Healthy meal plan for weight management:  A healthy meal plan includes a variety of foods, contains fewer calories, and helps you stay healthy  A healthy meal plan includes the following:  · Eat whole-grain foods more often  A healthy meal plan should contain fiber  Fiber is the part of grains, fruits, and vegetables that is not broken down by your body  Whole-grain foods are healthy and provide extra fiber in your diet  Some examples of whole-grain foods are whole-wheat breads and pastas, oatmeal, brown rice, and bulgur  · Eat a variety of vegetables every day  Include dark, leafy greens such as spinach, kale, tamera greens, and mustard greens  Eat yellow and orange vegetables such as carrots, sweet potatoes, and winter squash  · Eat a variety of fruits every day  Choose fresh or canned fruit (canned in its own juice or light syrup) instead of juice  Fruit juice has very little or no fiber  · Eat low-fat dairy foods  Drink fat-free (skim) milk or 1% milk  Eat fat-free yogurt and low-fat cottage cheese  Try low-fat cheeses such as mozzarella and other reduced-fat cheeses  · Choose meat and other protein foods that are low in fat  Choose beans or other legumes such as split peas or lentils  Choose fish, skinless poultry (chicken or turkey), or lean cuts of red meat (beef or pork)  Before you cook meat or poultry, cut off any visible fat  · Use less fat and oil  Try baking foods instead of frying them  Add less fat, such as margarine, sour cream, regular salad dressing and mayonnaise to foods  Eat fewer high-fat foods  Some examples of high-fat foods include french fries, doughnuts, ice cream, and cakes  · Eat fewer sweets  Limit foods and drinks that are high in sugar  This includes candy, cookies, regular soda, and sweetened drinks  Exercise:  Exercise at least 30 minutes per day on most days of the week  Some examples of exercise include walking, biking, dancing, and swimming  You can also fit in more physical activity by taking the stairs instead of the elevator or parking farther away from stores  Ask your healthcare provider about the best exercise plan for you  Narcotic (Opioid) Safety    Use narcotics safely:  · Take prescribed narcotics exactly as directed  · Do not give narcotics to others or take narcotics that belong to someone else  · Do not mix narcotics without medicines or alcohol  · Do not drive or operate heavy machinery after you take the narcotic  · Monitor for side effects and notify your healthcare provider if you experienced side effects such as nausea, sleepiness, itching, or trouble thinking clearly  Manage constipation:    Constipation is the most common side effect of narcotic medicine  Constipation is when you have hard, dry bowel movements, or you go longer than usual between bowel movements  Tell your healthcare provider about all changes in your bowel movements while you are taking narcotics  He or she may recommend laxative medicine to help you have a bowel movement  He or she may also change the kind of narcotic you are taking, or change when you take it  The following are more ways you can prevent or relieve constipation:    · Drink liquids as directed  You may need to drink extra liquids to help soften and move your bowels  Ask how much liquid to drink each day and which liquids are best for you  · Eat high-fiber foods    This may help decrease constipation by adding bulk to your bowel movements  High-fiber foods include fruits, vegetables, whole-grain breads and cereals, and beans  Your healthcare provider or dietitian can help you create a high-fiber meal plan  Your provider may also recommend a fiber supplement if you cannot get enough fiber from food  · Exercise regularly  Regular physical activity can help stimulate your intestines  Walking is a good exercise to prevent or relieve constipation  Ask which exercises are best for you  · Schedule a time each day to have a bowel movement  This may help train your body to have regular bowel movements  Bend forward while you are on the toilet to help move the bowel movement out  Sit on the toilet for at least 10 minutes, even if you do not have a bowel movement  Store narcotics safely:   · Store narcotics where others cannot easily get them  Keep them in a locked cabinet or secure area  Do not  keep them in a purse or other bag you carry with you  A person may be looking for something else and find the narcotics  · Make sure narcotics are stored out of the reach of children  A child can easily overdose on narcotics  Narcotics may look like candy to a small child  The best way to dispose of narcotics: The laws vary by country and area  In the [place], the best way is to return the narcotics through a take-back program  This program is offered by the [employer]  The following are options for using the program:  · Take the narcotics to a [employer] collection site  The site is often a law enforcement center  Call your local law enforcement center for scheduled take-back days in your area  You will be given information on where to go if the collection site is in a different location  · Take the narcotics to an approved pharmacy or hospital   A pharmacy or hospital may be set up as a collection site   You will need to ask if it is a [employer] collection site if you were not directed there  A pharmacy or doctor's office may not be able to take back narcotics unless it is a MAXINE site  · Use a mail-back system  This means you are given containers to put the narcotics into  You will then mail them in the containers  · Use a take-back drop box  This is a place to leave the narcotics at any time  People and animals will not be able to get into the box  Your local law enforcement agency can tell you where to find a drop box in your area  Other ways to manage pain:   · Ask your healthcare provider about non-narcotic medicines to control pain  Nonprescription medicines include NSAIDs (such as ibuprofen) and acetaminophen  Prescription medicines include muscle relaxers, antidepressants, and steroids  · Pain may be managed without any medicines  Some ways to relieve pain include massage, aromatherapy, or meditation  Physical or occupational therapy may also help  For more information:   · Drug Enforcement Administration  [address] [place] [address] [address]  Phone: 8- [phone] - [phone]  Web Address: [facility]/drug_disposal/    · [address]  and Drug Administration  [facility] , [address] [place] [address]  Phone: 1- 890 - [phone]  Web Address: http://[employer]/     © Copyright [employer] 2018 Information is for End User's use only and may not be sold, redistributed or otherwise used for commercial purposes  All illustrations and images included in CareNotes® are the copyrighted property of [employer]  or [facility] Preventive Visit Patient Instructions  Thank you for completing your Welcome to Medicare Visit or Medicare Annual Wellness Visit today  Your next wellness visit will be due in one year (3/3/2023)  The screening/preventive services that you may require over the next 5-10 years are detailed below  Some tests may not apply to you based off risk factors and/or age   Screening tests ordered at today's visit but not completed yet may show as past due  Also, please note that scanned in results may not display below  Preventive Screenings:  Service Recommendations Previous Testing/Comments   Colorectal Cancer Screening  * Colonoscopy    * Fecal Occult Blood Test (FOBT)/Fecal Immunochemical Test (FIT)  * Fecal DNA/Cologuard Test  * Flexible Sigmoidoscopy Age: 54-65 years old   Colonoscopy: every 10 years (may be performed more frequently if at higher risk)  OR  FOBT/FIT: every 1 year  OR  Cologuard: every 3 years  OR  Sigmoidoscopy: every 5 years  Screening may be recommended earlier than age 48 if at higher risk for colorectal cancer  Also, an individualized decision between you and your healthcare provider will decide whether screening between the ages of 74-80 would be appropriate  Colonoscopy: 08/13/2021  FOBT/FIT: Not on file  Cologuard: Not on file  Sigmoidoscopy: Not on file    Screening Not Indicated     Breast Cancer Screening Age: 36 years old  Frequency: every 1-2 years  Not required if history of left and right mastectomy Mammogram: 12/29/2021    Screening Current   Cervical Cancer Screening Between the ages of 21-29, pap smear recommended once every 3 years  Between the ages of 33-67, can perform pap smear with HPV co-testing every 5 years  Recommendations may differ for women with a history of total hysterectomy, cervical cancer, or abnormal pap smears in past  Pap Smear: Not on file    Screening Not Indicated   Hepatitis C Screening Once for adults born between 1945 and 1965  More frequently in patients at high risk for Hepatitis C Hep C Antibody: Not on file    Screening Not Indicated   Diabetes Screening 1-2 times per year if you're at risk for diabetes or have pre-diabetes Fasting glucose: 113 mg/dL   A1C: 6 6 %    Screening Not Indicated  History Diabetes   Cholesterol Screening Once every 5 years if you don't have a lipid disorder  May order more often based on risk factors   Lipid panel: 05/17/2021    Screening Not Indicated  History Lipid Disorder     Other Preventive Screenings Covered by Medicare:  6  Abdominal Aortic Aneurysm (AAA) Screening: covered once if your at risk  You're considered to be at risk if you have a family history of AAA  7  Lung Cancer Screening: covers low dose CT scan once per year if you meet all of the following conditions: (1) Age 50-69; (2) No signs or symptoms of lung cancer; (3) Current smoker or have quit smoking within the last 15 years; (4) You have a tobacco smoking history of at least 30 pack years (packs per day multiplied by number of years you smoked); (5) You get a written order from a healthcare provider  8  Glaucoma Screening: covered annually if you're considered high risk: (1) You have diabetes OR (2) Family history of glaucoma OR (3)  aged 48 and older OR (3)  American aged 72 and older  5  Osteoporosis Screening: covered every 2 years if you meet one of the following conditions: (1) You're estrogen deficient and at risk for osteoporosis based off medical history and other findings; (2) Have a vertebral abnormality; (3) On glucocorticoid therapy for more than 3 months; (4) Have primary hyperparathyroidism; (5) On osteoporosis medications and need to assess response to drug therapy  · Last bone density test (DXA Scan): Not on file  10  HIV Screening: covered annually if you're between the age of 12-76  Also covered annually if you are younger than 13 and older than 72 with risk factors for HIV infection  For pregnant patients, it is covered up to 3 times per pregnancy      Immunizations:  Immunization Recommendations   Influenza Vaccine Annual influenza vaccination during flu season is recommended for all persons aged >= 6 months who do not have contraindications   Pneumococcal Vaccine (Prevnar and Pneumovax)  * Prevnar = PCV13  * Pneumovax = PPSV23   Adults 25-60 years old: 1-3 doses may be recommended based on certain risk factors  Adults 72 years old: Prevnar (PCV13) vaccine recommended followed by Pneumovax (PPSV23) vaccine  If already received PPSV23 since turning 65, then PCV13 recommended at least one year after PPSV23 dose  Hepatitis B Vaccine 3 dose series if at intermediate or high risk (ex: diabetes, end stage renal disease, liver disease)   Tetanus (Td) Vaccine - COST NOT COVERED BY MEDICARE PART B Following completion of primary series, a booster dose should be given every 10 years to maintain immunity against tetanus  Td may also be given as tetanus wound prophylaxis  Tdap Vaccine - COST NOT COVERED BY MEDICARE PART B Recommended at least once for all adults  For pregnant patients, recommended with each pregnancy  Shingles Vaccine (Shingrix) - COST NOT COVERED BY MEDICARE PART B  2 shot series recommended in those aged 48 and above     Health Maintenance Due:      Topic Date Due    Breast Cancer Screening: Mammogram  12/29/2022     Immunizations Due:      Topic Date Due    DTaP,Tdap,and Td Vaccines (1 - Tdap) Never done    COVID-19 Vaccine (3 - Booster for Curtis Scales series) 07/17/2021     Advance Directives   What are advance directives? Advance directives are legal documents that state your wishes and plans for medical care  These plans are made ahead of time in case you lose your ability to make decisions for yourself  Advance directives can apply to any medical decision, such as the treatments you want, and if you want to donate organs  What are the types of advance directives? There are many types of advance directives, and each state has rules about how to use them  You may choose a combination of any of the following:  · Living will: This is a written record of the treatment you want  You can also choose which treatments you do not want, which to limit, and which to stop at a certain time  This includes surgery, medicine, IV fluid, and tube feedings     · Durable power of  for healthcare [place] [facility]): This is a written record that states who you want to make healthcare choices for you when you are unable to make them for yourself  This person, called a proxy, is usually a family member or a friend  You may choose more than 1 proxy  · Do not resuscitate (DNR) order:  A DNR order is used in case your heart stops beating or you stop breathing  It is a request not to have certain forms of treatment, such as CPR  A DNR order may be included in other types of advance directives  · Medical directive: This covers the care that you want if you are in a coma, near death, or unable to make decisions for yourself  You can list the treatments you want for each condition  Treatment may include pain medicine, surgery, blood transfusions, dialysis, IV or tube feedings, and a ventilator (breathing machine)  · Values history: This document has questions about your views, beliefs, and how you feel and think about life  This information can help others choose the care that you would choose  Why are advance directives important? An advance directive helps you control your care  Although spoken wishes may be used, it is better to have your wishes written down  Spoken wishes can be misunderstood, or not followed  Treatments may be given even if you do not want them  An advance directive may make it easier for your family to make difficult choices about your care  Urinary Incontinence   Urinary incontinence (UI)  is when you lose control of your bladder  UI develops because your bladder cannot store or empty urine properly  The 3 most common types of UI are stress incontinence, urge incontinence, or both  Medicines:   · May be given to help strengthen your bladder control  Report any side effects of medication to your healthcare provider  Do pelvic muscle exercises often:  Your pelvic muscles help you stop urinating  Squeeze these muscles tight for 5 seconds, then relax for 5 seconds   Gradually work up to squeezing for 10 seconds  Do 3 sets of 15 repetitions a day, or as directed  This will help strengthen your pelvic muscles and improve bladder control  Train your bladder:  Go to the bathroom at set times, such as every 2 hours, even if you do not feel the urge to go  You can also try to hold your urine when you feel the urge to go  For example, hold your urine for 5 minutes when you feel the urge to go  As that becomes easier, hold your urine for 10 minutes  Self-care:   · Keep a UI record  Write down how often you leak urine and how much you leak  Make a note of what you were doing when you leaked urine  · Drink liquids as directed  You may need to limit the amount of liquid you drink to help control your urine leakage  Do not drink any liquid right before you go to bed  Limit or do not have drinks that contain caffeine or alcohol  · Prevent constipation  Eat a variety of high-fiber foods  Good examples are high-fiber cereals, beans, vegetables, and whole-grain breads  Walking is the best way to trigger your intestines to have a bowel movement  · Exercise regularly and maintain a healthy weight  Weight loss and exercise will decrease pressure on your bladder and help you control your leakage  · Use a catheter as directed  to help empty your bladder  A catheter is a tiny, plastic tube that is put into your bladder to drain your urine  · Go to behavior therapy as directed  Behavior therapy may be used to help you learn to control your urge to urinate  Weight Management   Why it is important to manage your weight:  Being overweight increases your risk of health conditions such as heart disease, high blood pressure, type 2 diabetes, and certain types of cancer  It can also increase your risk for osteoarthritis, sleep apnea, and other respiratory problems  Aim for a slow, steady weight loss  Even a small amount of weight loss can lower your risk of health problems    How to lose weight safely:  A safe and healthy way to lose weight is to eat fewer calories and get regular exercise  You can lose up about 1 pound a week by decreasing the number of calories you eat by 500 calories each day  Healthy meal plan for weight management:  A healthy meal plan includes a variety of foods, contains fewer calories, and helps you stay healthy  A healthy meal plan includes the following:  · Eat whole-grain foods more often  A healthy meal plan should contain fiber  Fiber is the part of grains, fruits, and vegetables that is not broken down by your body  Whole-grain foods are healthy and provide extra fiber in your diet  Some examples of whole-grain foods are whole-wheat breads and pastas, oatmeal, brown rice, and bulgur  · Eat a variety of vegetables every day  Include dark, leafy greens such as spinach, kale, tamera greens, and mustard greens  Eat yellow and orange vegetables such as carrots, sweet potatoes, and winter squash  · Eat a variety of fruits every day  Choose fresh or canned fruit (canned in its own juice or light syrup) instead of juice  Fruit juice has very little or no fiber  · Eat low-fat dairy foods  Drink fat-free (skim) milk or 1% milk  Eat fat-free yogurt and low-fat cottage cheese  Try low-fat cheeses such as mozzarella and other reduced-fat cheeses  · Choose meat and other protein foods that are low in fat  Choose beans or other legumes such as split peas or lentils  Choose fish, skinless poultry (chicken or turkey), or lean cuts of red meat (beef or pork)  Before you cook meat or poultry, cut off any visible fat  · Use less fat and oil  Try baking foods instead of frying them  Add less fat, such as margarine, sour cream, regular salad dressing and mayonnaise to foods  Eat fewer high-fat foods  Some examples of high-fat foods include french fries, doughnuts, ice cream, and cakes  · Eat fewer sweets  Limit foods and drinks that are high in sugar   This includes candy, cookies, regular soda, and sweetened drinks  Exercise:  Exercise at least 30 minutes per day on most days of the week  Some examples of exercise include walking, biking, dancing, and swimming  You can also fit in more physical activity by taking the stairs instead of the elevator or parking farther away from stores  Ask your healthcare provider about the best exercise plan for you  Narcotic (Opioid) Safety    Use narcotics safely:  · Take prescribed narcotics exactly as directed  · Do not give narcotics to others or take narcotics that belong to someone else  · Do not mix narcotics without medicines or alcohol  · Do not drive or operate heavy machinery after you take the narcotic  · Monitor for side effects and notify your healthcare provider if you experienced side effects such as nausea, sleepiness, itching, or trouble thinking clearly  Manage constipation:    Constipation is the most common side effect of narcotic medicine  Constipation is when you have hard, dry bowel movements, or you go longer than usual between bowel movements  Tell your healthcare provider about all changes in your bowel movements while you are taking narcotics  He or she may recommend laxative medicine to help you have a bowel movement  He or she may also change the kind of narcotic you are taking, or change when you take it  The following are more ways you can prevent or relieve constipation:    · Drink liquids as directed  You may need to drink extra liquids to help soften and move your bowels  Ask how much liquid to drink each day and which liquids are best for you  · Eat high-fiber foods  This may help decrease constipation by adding bulk to your bowel movements  High-fiber foods include fruits, vegetables, whole-grain breads and cereals, and beans  Your healthcare provider or dietitian can help you create a high-fiber meal plan  Your provider may also recommend a fiber supplement if you cannot get enough fiber from food  · Exercise regularly    Regular physical activity can help stimulate your intestines  Walking is a good exercise to prevent or relieve constipation  Ask which exercises are best for you  · Schedule a time each day to have a bowel movement  This may help train your body to have regular bowel movements  Bend forward while you are on the toilet to help move the bowel movement out  Sit on the toilet for at least 10 minutes, even if you do not have a bowel movement  Store narcotics safely:   · Store narcotics where others cannot easily get them  Keep them in a locked cabinet or secure area  Do not  keep them in a purse or other bag you carry with you  A person may be looking for something else and find the narcotics  · Make sure narcotics are stored out of the reach of children  A child can easily overdose on narcotics  Narcotics may look like candy to a small child  The best way to dispose of narcotics: The laws vary by country and area  In the [place], the best way is to return the narcotics through a take-back program  This program is offered by the [employer]  The following are options for using the program:  · Take the narcotics to a [employer] collection site  The site is often a law enforcement center  Call your local law enforcement center for scheduled take-back days in your area  You will be given information on where to go if the collection site is in a different location  · Take the narcotics to an approved pharmacy or hospital   A pharmacy or hospital may be set up as a collection site  You will need to ask if it is a [employer] collection site if you were not directed there  A pharmacy or doctor's office may not be able to take back narcotics unless it is a [employer] site  · Use a mail-back system  This means you are given containers to put the narcotics into  You will then mail them in the containers  · Use a take-back drop box  This is a place to leave the narcotics at any time   People and animals will not be able to get into the box  Your local law enforcement agency can tell you where to find a drop box in your area  Other ways to manage pain:   · Ask your healthcare provider about non-narcotic medicines to control pain  Nonprescription medicines include NSAIDs (such as ibuprofen) and acetaminophen  Prescription medicines include muscle relaxers, antidepressants, and steroids  · Pain may be managed without any medicines  Some ways to relieve pain include massage, aromatherapy, or meditation  Physical or occupational therapy may also help  For more information:   · Drug Enforcement Administration  [facility] [address]  Phone: 4- 028 - 779-5253  Web Address: [facility] gov/drug_disposal/    · [address]  and Drug Administration  [facility] , [address] [place] [address]  Phone: 5- 794 - 396-8056  Web Address: http://[employer]/     © Copyright [facility] [facility] 2018 Information is for End User's use only and may not be sold, redistributed or otherwise used for commercial purposes   All illustrations and images included in CareNotes® are the copyrighted property of [employer]  or [address] [place] [address] [facility] [facility]

## 2022-03-02 NOTE — TELEPHONE ENCOUNTER
Patient sees Dr Jennifer Stout  Patients son Jamaal Flores is calling in wanting to know when the surgery was with  for her left upper leg  He is wanting to obtain the records so trans caller over to [facility] \[facility] [place] [facility]\"" to assist further

## 2022-03-02 NOTE — PROGRESS NOTES
Assessment and Plan:     Problem List Items Addressed This Visit     None           Preventive health issues were discussed with patient, and age appropriate screening tests were ordered as noted in patient's After Visit Summary  Personalized health advice and appropriate referrals for health education or preventive services given if needed, as noted in patient's After Visit Summary  History of Present Illness:     Patient presents for Welcome to Medicare visit  Patient Care Team:  Germaine Wing MD as PCP - General (Internal Medicine)  Germaine Wing MD as PCP - [address] [place] [address] ([place])  [facility]  Lorn Ober, MD Jens Klinefelter, MD Onnie Belt, MD (Gastroenterology)     Review of Systems:     Review of Systems   Respiratory: Negative for shortness of breath  Cardiovascular: Positive for chest pain  Gastrointestinal: Negative for abdominal pain        Problem List:     Patient Active Problem List   Diagnosis    Allergic rhinitis    Atherosclerosis of native artery of both lower extremities with intermittent claudication (HCC)    Carotid stenosis, bilateral    Carpal tunnel syndrome on both sides    Anxiety and depression    Type 2 diabetes mellitus, with long-term current use of insulin ([address] )    Diabetic peripheral neuropathy ([address] )    Hyperlipidemia    Essential hypertension    Mesenteric artery stenosis (HCC)    Seborrheic keratosis    Skin lesion    Vitamin D deficiency    Cerebral infarction ([address] )    Peripheral neuropathy    Trigger little finger of right hand    Urinary incontinence in female    Vertigo    Degenerative joint disease (DJD) of lumbar spine    Pain of right hip joint    Gastrointestinal hemorrhage associated with gastric ulcer    Acute on chronic blood loss anemia    Chronic atrial fibrillation (HCC)    CAD (coronary artery disease) s/p CABG    Pacemaker    Elevated troponin    Abnormal CT of the chest    GI bleed    Closed intertrochanteric fracture of left femur ([facility])    Ambulatory dysfunction    COPD (chronic obstructive pulmonary disease) (HCC)    Acute kidney injury (HCC)    Hyponatremia    Hyperkalemia    Femoral-popliteal bypass graft occlusion, right, subsequent encounter    Acute encephalopathy    PAD (peripheral artery disease) ([facility])    Urinary tract infection    Pressure injury of left heel, stage 1    Pressure injury of buttock, stage 1    Activity intolerance    Surgical wound present    Pressure injury of buttock, stage 2 (HCC)    Pressure injury of sacral region, stage 2 (HCC)    Deep tissue injury    Traumatic skin ulcer, limited to breakdown of skin ([facility] )    Debility      Past Medical and Surgical History:     Past Medical History:   Diagnosis Date    Anxiety     Chronic atrial fibrillation (HCC)     Colonic polyp     COPD (chronic obstructive pulmonary disease) ([facility]     Diabetes mellitus type 2 in nonobese ([facility])     Diverticulosis     large intestine without hemmorhage    Gastric ulcer     Heart failure (HCC)     Hyperlipidemia     Hypertension     Iron deficiency anemia     Major depressive disorder     Peripheral vascular disease ([facility])     T12 compression fracture ([facility] ) 3/1/2019     Past Surgical History:   Procedure Laterality Date    CAROTID ENDARTERECTOMY      CORONARY ANGIOPLASTY WITH STENT PLACEMENT      leg and chest    CORONARY ANGIOPLASTY WITH STENT PLACEMENT      adominal stent    CORONARY ARTERY BYPASS GRAFT      triple artery    HYSTERECTOMY      NH OPEN RX FEMUR FX+INTRAMED DEMETRIO Left 9/29/2021    Procedure: INSERTION NAIL IM FEMUR ANTEGRADE (TROCHANTERIC)- LEFT;  Surgeon: Indy Nunez DO;  Location: [facility];  Service: Orthopedics    TONSILLECTOMY      VASCULAR SURGERY        Family History:     Family History   Problem Relation Age of Onset    Lung cancer Mother     Migraines Mother     Stroke Brother     No Known Problems Father     No Known Problems Maternal Grandmother     No Known Problems Paternal Grandmother     No Known Problems Maternal Aunt     No Known Problems Maternal Aunt     No Known Problems Maternal Aunt     No Known Problems Maternal Aunt     No Known Problems Maternal Aunt     No Known Problems Maternal Aunt     Breast cancer Neg Hx       Social History:     Social History     Socioeconomic History    Marital status:      Spouse name: None    Number of children: None    Years of education: None    Highest education level: None   Occupational History     Comment: retired   Tobacco Use    Smoking status: Former Smoker     Quit date: 2021     Years since quittin 7    Smokeless tobacco: Never Used   Vaping Use    Vaping Use: Never used   Substance and Sexual Activity    Alcohol use: Yes     Alcohol/week: 2 0 standard drinks     Types: 2 Glasses of wine per week    Drug use: No    Sexual activity: Not Currently     Partners: Male   Other Topics Concern    None   Social History Narrative    None     Social Determinants of Health     Financial Resource Strain: Not on file   Food Insecurity: Not on file   Transportation Needs: No Transportation Needs    Lack of Transportation (Medical): No    Lack of Transportation (Non-Medical):  No   Physical Activity: Not on file   Stress: Not on file   Social Connections: Not on file   Intimate Partner Violence: Not on file   Housing Stability: Not on file      Medications and Allergies:     Current Outpatient Medications   Medication Sig Dispense Refill    acetaminophen (TYLENOL) 325 mg tablet Take 975 mg by mouth every 8 (eight) hours      albuterol (PROVENTIL HFA,VENTOLIN HFA) 90 mcg/act inhaler Inhale 2 puffs every 4 (four) hours as needed      Alcohol Swabs (Pharmacist Choice Alcohol) PADS 2 (two) times a day Use to test       ALPRAZolam (XANAX) 0 25 mg tablet Take 1 tablet (0 25 mg total) by mouth daily as needed for anxiety 7 tablet 0    BD Pen Needle Prabha U/F 32G X 4 MM MISC USE ONCE DAILY AS DIRECTED BY DOCTOR 100 each 5    Blood Glucose Monitoring Suppl (ONE TOUCH ULTRA 2) w/Device KIT 2 (two) times a day Use to test       Continuous Blood Gluc  (FreeStyle Afsaneh [place]) MAURY Use 1 Units 4 (four) times a day 1 each 6    Continuous Blood Gluc Sensor (FreeStyle Afsaneh 14 Day Sensor) MISC Use 1 each 4 (four) times a day 1 each 6    Docusate Sodium (DSS) 100 MG CAPS Take 100 mg by mouth 2 (two) times a day        DULoxetine (CYMBALTA) 60 mg delayed release capsule Take 1 capsule (60 mg total) by mouth daily 30 capsule 3    escitalopram (LEXAPRO) 10 mg tablet Take 1 tablet (10 mg total) by mouth daily for 14 days 30 tablet 3    gabapentin (NEURONTIN) 100 mg capsule TAKE ONE(1) CAPSULE BY MOUTH THREE TIMES A  capsule 1    insulin aspart (NovoLOG FlexPen) 100 UNIT/ML injection pen Inject 12 Units under the skin 3 (three) times a day with meals 15 mL 5    insulin detemir (Levemir FlexTouch) 100 Units/mL injection pen Inject 28 Units under the skin daily 15 mL 5    insulin lispro (HumaLOG) 100 units/mL injection Inject 5 Units under the skin 3 (three) times a day with meals 15 mL 3    Lancet Devices (Easy Mini Eject Lancing Device) MISC 2 (two) times a day Use to test      Lancets (ONETOUCH ULTRASOFT) lancets Check sugar twice a day 100 each 5    lidocaine (LIDODERM) 5 % Apply 2 patches topically daily Remove & Discard patch within 12 hours or as directed by MD  Apply to Left hip area and Right foot 30 patch 0    lisinopril (ZESTRIL) 5 mg tablet Take 1 tablet (5 mg total) by mouth 2 (two) times a day 60 tablet 5    metoprolol tartrate (LOPRESSOR) 50 mg tablet Take 50 mg by mouth 2 (two) times a day      Multiple Vitamins-Minerals (multivitamin with minerals) tablet Take 1 tablet by mouth daily      neomycin-polymyxin-dexamethasone (MAXITROL) 0 35%-10,000 units/g-0 1% APPLY 1/2 RIBBIN TO AFFECTED EYES TWICE DAILY AS DIRECTED AFTER CLEANING LIDS      nicotine (NICODERM CQ) 14 mg/24hr TD 24 hr patch Place 1 patch on the skin every 24 hours      nitroglycerin (NITROSTAT) 0 4 mg SL tablet Place 1 tablet under the tongue once as needed  = may repeat every 5 minutes x 2 doses      OneTouch Ultra test strip use 1 TEST STRIP to TEST BLOOD SUGAR twice a day 100 each 3    pantoprazole (PROTONIX) 40 mg tablet Take 1 tablet (40 mg total) by mouth 2 (two) times a day 28 tablet 0    Protein (PROSOURCE NO CARB PO) Take 30 mL by mouth 2 (two) times a day      simvastatin (ZOCOR) 40 mg tablet TAKE 1 TABLET (40 MG TOTAL) BY MOUTH DAILY 90 tablet 3    Stiolto Respimat 2 5-2 5 MCG/ACT inhaler INHALE 2 PUFFS INTO THE LUNGS DAILY 4 g 3    traMADol (ULTRAM) 50 mg tablet Take 1 tablet (50 mg total) by mouth every 8 (eight) hours as needed for moderate pain or severe pain 20 tablet 0     No current facility-administered medications for this visit  Allergies   Allergen Reactions    Calcipotriene Hives    Lactose - Food Allergy GI Intolerance    Penicillin V Hives     Category:  Allergy;     Propoxyphene Rash      Immunizations:     Immunization History   Administered Date(s) Administered    COVID-19 MODERNA VACC 0 5 ML IM 01/20/2021, 02/17/2021    INFLUENZA 12/17/2018    Influenza Split High Dose Preservative Free IM 10/01/2016    Influenza, high dose seasonal 0 7 mL 12/17/2018, 10/02/2020, 10/07/2021    Influenza, seasonal, injectable 10/01/2015, 10/16/2017    Pneumococcal Conjugate 13-Valent 10/14/2015    Pneumococcal Polysaccharide PPV23 11/16/2017    Zoster 02/20/2014    influenza, trivalent, adjuvanted 09/11/2019      Health Maintenance:         Topic Date Due    Breast Cancer Screening: Mammogram  12/29/2022         Topic Date Due    DTaP,Tdap,and Td Vaccines (1 - Tdap) Never done    COVID-19 Vaccine (3 - Booster for Moderna series) 07/17/2021      Medicare Screening Tests and Risk Assessments:     Amy Dubon is here for her Subsequent Wellness visit  Health Risk Assessment:   Patient rates overall health as good  Patient feels that their physical health rating is same  Patient is satisfied with their life  Eyesight was rated as same  Hearing was rated as same  Patient feels that their emotional and mental health rating is same  Patients states they are never, rarely angry  Patient states they are never, rarely unusually tired/fatigued  Pain experienced in the last 7 days has been none  Patient states that she has experienced no weight loss or gain in last 6 months  Depression Screening:   PHQ-9 Score: 0      Fall Risk Screening: In the past year, patient has experienced: no history of falling in past year      Urinary Incontinence Screening:   Patient has not leaked urine accidently in the last six months  Home Safety:  Patient has trouble with stairs inside or outside of their home  Patient has working smoke alarms and has working carbon monoxide detector  Home safety hazards include: none  Nutrition:   Current diet is Regular  Medications:   Patient is currently taking over-the-counter supplements  OTC medications include: see medication list  Patient is not able to manage medications  Activities of Daily Living (ADLs)/Instrumental Activities of Daily Living (IADLs):   Walk and transfer into and out of bed and chair?: Yes  Dress and groom yourself?: Yes    Bathe or shower yourself?: No    Feed yourself? Yes  Do your laundry/housekeeping?: No  Manage your money, pay your bills and track your expenses?: No  Make your own meals?: No    Do your own shopping?: No    Previous Hospitalizations:   Any hospitalizations or ED visits within the last 12 months?: Yes    How many hospitalizations have you had in the last year?: 1-2    Advance Care Planning:   Living will: Yes    Durable POA for healthcare: Yes    Advanced directive: Yes    Advanced directive counseling given: Yes      Comments: Long discussion with patient and son  Since she declines treatment for the lung mass and her heart, suggested hospice evaluation  Her son's wife was on hospice so they are familiar  Cognitive Screening:   Provider or family/friend/caregiver concerned regarding cognition?: Yes    Cognition Comments: Becoming forgetful at times  Tired more  Since she is most likely going to be on hospice, do not feel further evaluation is warranted      PREVENTIVE SCREENINGS      Cardiovascular Screening:    General: Screening Not Indicated and History Lipid Disorder      Diabetes Screening:     General: Screening Not Indicated and History Diabetes      Colorectal Cancer Screening:     General: Screening Not Indicated      Breast Cancer Screening:     General: Screening Current      Cervical Cancer Screening:    General: Screening Not Indicated      Osteoporosis Screening:    General: Patient Declines      Abdominal Aortic Aneurysm (AAA) Screening:        General: Screening Not Indicated      Lung Cancer Screening:     General: Screening Not Indicated      Hepatitis C Screening:    General: Screening Not Indicated    Screening, Brief Intervention, and Referral to Treatment (SBIRT)    Screening      AUDIT-C Screenin) How often did you have a drink containing alcohol in the past year? never  2) How many drinks did you have on a typical day when you were drinking in the past year? 0  3) How often did you have 6 or more drinks on one occasion in the past year? never    AUDIT-C Score: 0  Interpretation: Score 0-2 (female): Negative screen for alcohol misuse    Single Item Drug Screening:  How often have you used an illegal drug (including marijuana) or a prescription medication for non-medical reasons in the past year? never    Single Item Drug Screen Score: 0  Interpretation: Negative screen for possible drug use disorder    Review of Current Opioid Use  Opioid Risk Tool (ORT) Score: 0  Opioid Risk Tool (ORT) Interpretation: Score 0-3: Low risk for opioid misuse    No exam data present     Physical Exam:     /62 (BP Location: Right arm, Patient Position: Sitting, Cuff Size: Standard)   Pulse 80   Resp 20   Ht 4' 11" (1 499 m)   Wt 60 3 kg (133 lb)   SpO2 90%   BMI 26 86 kg/m²     Physical Exam  Vitals and nursing note reviewed  Constitutional:       Appearance: She is well-developed  Neurological:      Mental Status: She is alert and oriented to person, place, and time  Psychiatric:         Behavior: Behavior normal          Thought Content:  Thought content normal          Judgment: Judgment normal           eRji Minaya MD

## 2022-03-02 NOTE — PROGRESS NOTES
Assessment/Plan:       With her refusal of further, more aggressive treatment, long discussion with the patient and her son regarding hospice  Will place a hospice evaluation  This is the best option for her at this point  She has been refusing further evaluation for the lung mass since last summer  Quality Measures:       Return in about 6 weeks (around 4/13/2022) for Recheck  No problem-specific Assessment & Plan notes found for this encounter  Diagnoses and all orders for this visit:    Coronary artery disease without angina pectoris, unspecified vessel or lesion type, unspecified whether native or transplanted heart    Type 2 diabetes mellitus with other specified complication, with long-term current use of insulin (HCC)    Essential hypertension    Lung mass    Other orders  -     isosorbide mononitrate (IMDUR) 30 mg 24 hr tablet; Take 30 mg by mouth daily          Subjective:      Patient ID: Judy Roth is a 79 y o  female  Patient comes in today for follow-up with her son and aid as well as Medicare wellness  Her blood pressure appears controlled after she takes her medicines  They tried to do a cardiac catheterization but had difficulty so she has declined further treatment  She is now on a long-acting nitro as well as p r n  sublingual nitro  She has been using the sublingual almost every day now  The lung mass has gotten bigger  She also wants nothing done with that  Her sugars are just below 200 but she has not had any low sugars since we made adjustments  Both her son and I agree that we would rather keep her with these numbers than risk any low sugars anymore  ALLERGIES:  Allergies   Allergen Reactions    Calcipotriene Hives    Lactose - Food Allergy GI Intolerance    Penicillin V Hives     Category:  Allergy;     Propoxyphene Rash       CURRENT MEDICATIONS:    Current Outpatient Medications:     acetaminophen (TYLENOL) 325 mg tablet, Take 975 mg by mouth every 8 (eight) hours, Disp: , Rfl:     albuterol (PROVENTIL HFA,VENTOLIN HFA) 90 mcg/act inhaler, Inhale 2 puffs every 4 (four) hours as needed, Disp: , Rfl:     Alcohol Swabs (Pharmacist Choice Alcohol) PADS, 2 (two) times a day Use to test , Disp: , Rfl:     ALPRAZolam (XANAX) 0 25 mg tablet, Take 1 tablet (0 25 mg total) by mouth daily as needed for anxiety, Disp: 7 tablet, Rfl: 0    BD Pen Needle Prabha U/F 32G X 4 MM MISC, USE ONCE DAILY AS DIRECTED BY DOCTOR, Disp: 100 each, Rfl: 5    Blood Glucose Monitoring Suppl (ONE TOUCH ULTRA 2) w/Device KIT, 2 (two) times a day Use to test , Disp: , Rfl:     Continuous Blood Gluc  (FreeStyle Afsaneh [place]) MAURY, Use 1 Units 4 (four) times a day, Disp: 1 each, Rfl: 6    Continuous Blood Gluc Sensor (FreeStyle Afsaneh 14 Day Sensor) MISC, Use 1 each 4 (four) times a day, Disp: 1 each, Rfl: 6    Docusate Sodium (DSS) 100 MG CAPS, Take 100 mg by mouth 2 (two) times a day  , Disp: , Rfl:     DULoxetine (CYMBALTA) 60 mg delayed release capsule, Take 1 capsule (60 mg total) by mouth daily, Disp: 30 capsule, Rfl: 3    escitalopram (LEXAPRO) 10 mg tablet, Take 1 tablet (10 mg total) by mouth daily for 14 days, Disp: 30 tablet, Rfl: 3    gabapentin (NEURONTIN) 100 mg capsule, TAKE ONE(1) CAPSULE BY MOUTH THREE TIMES A DAY, Disp: 270 capsule, Rfl: 1    insulin aspart (NovoLOG FlexPen) 100 UNIT/ML injection pen, Inject 12 Units under the skin 3 (three) times a day with meals, Disp: 15 mL, Rfl: 5    insulin detemir (Levemir FlexTouch) 100 Units/mL injection pen, Inject 28 Units under the skin daily, Disp: 15 mL, Rfl: 5    insulin lispro (HumaLOG) 100 units/mL injection, Inject 5 Units under the skin 3 (three) times a day with meals, Disp: 15 mL, Rfl: 3    isosorbide mononitrate (IMDUR) 30 mg 24 hr tablet, Take 30 mg by mouth daily, Disp: , Rfl:     Lancet Devices (Easy Mini Eject Lancing Device) MISC, 2 (two) times a day Use to test, Disp: , Rfl:     Lancets (ONETOUCH ULTRASOFT) lancets, Check sugar twice a day, Disp: 100 each, Rfl: 5    lidocaine (LIDODERM) 5 %, Apply 2 patches topically daily Remove & Discard patch within 12 hours or as directed by MD  Apply to Left hip area and Right foot, Disp: 30 patch, Rfl: 0    lisinopril (ZESTRIL) 5 mg tablet, Take 1 tablet (5 mg total) by mouth 2 (two) times a day, Disp: 60 tablet, Rfl: 5    metoprolol tartrate (LOPRESSOR) 50 mg tablet, Take 50 mg by mouth 2 (two) times a day, Disp: , Rfl:     Multiple Vitamins-Minerals (multivitamin with minerals) tablet, Take 1 tablet by mouth daily, Disp: , Rfl:     neomycin-polymyxin-dexamethasone (MAXITROL) 0 35%-10,000 units/g-0 1%, APPLY 1/2 RIBBIN TO AFFECTED EYES TWICE DAILY AS DIRECTED AFTER CLEANING LIDS, Disp: , Rfl:     nicotine (NICODERM CQ) 14 mg/24hr TD 24 hr patch, Place 1 patch on the skin every 24 hours, Disp: , Rfl:     nitroglycerin (NITROSTAT) 0 4 mg SL tablet, Place 1 tablet under the tongue once as needed  = may repeat every 5 minutes x 2 doses, Disp: , Rfl:     OneTouch Ultra test strip, use 1 TEST STRIP to TEST BLOOD SUGAR twice a day, Disp: 100 each, Rfl: 3    pantoprazole (PROTONIX) 40 mg tablet, Take 1 tablet (40 mg total) by mouth 2 (two) times a day, Disp: 28 tablet, Rfl: 0    Protein (PROSOURCE NO CARB PO), Take 30 mL by mouth 2 (two) times a day, Disp: , Rfl:     simvastatin (ZOCOR) 40 mg tablet, TAKE 1 TABLET (40 MG TOTAL) BY MOUTH DAILY, Disp: 90 tablet, Rfl: 3    Stiolto Respimat 2 5-2 5 MCG/ACT inhaler, INHALE 2 PUFFS INTO THE LUNGS DAILY, Disp: 4 g, Rfl: 3    traMADol (ULTRAM) 50 mg tablet, Take 1 tablet (50 mg total) by mouth every 8 (eight) hours as needed for moderate pain or severe pain, Disp: 20 tablet, Rfl: 0    ACTIVE PROBLEM LIST:  Patient Active Problem List   Diagnosis    Allergic rhinitis    Atherosclerosis of native artery of both lower extremities with intermittent claudication (HCC)    Carotid stenosis, bilateral    Carpal tunnel syndrome on both sides    Anxiety and depression    Type 2 diabetes mellitus, with long-term current use of insulin (HCC)    Diabetic peripheral neuropathy (HCC)    Hyperlipidemia    Essential hypertension    Mesenteric artery stenosis (HCC)    Seborrheic keratosis    Skin lesion    Vitamin D deficiency    Cerebral infarction (HCC)    Peripheral neuropathy    Trigger little finger of right hand    Urinary incontinence in female    Vertigo    Degenerative joint disease (DJD) of lumbar spine    Pain of right hip joint    Gastrointestinal hemorrhage associated with gastric ulcer    Acute on chronic blood loss anemia    Chronic atrial fibrillation (HCC)    CAD (coronary artery disease) s/p CABG    Pacemaker    Elevated troponin    Abnormal CT of the chest    GI bleed    Closed intertrochanteric fracture of left femur (HCC)    Ambulatory dysfunction    COPD (chronic obstructive pulmonary disease) (HCC)    Acute kidney injury (HCC)    Hyponatremia    Hyperkalemia    Femoral-popliteal bypass graft occlusion, right, subsequent encounter    Acute encephalopathy    [facility]peripheral artery disease) ([facility])    Urinary tract infection    Pressure injury of left heel, stage 1    Pressure injury of buttock, stage 1    Activity intolerance    Surgical wound present    Pressure injury of buttock, stage 2 (HCC)    Pressure injury of sacral region, stage 2 (HCC)    Deep tissue injury    Traumatic skin ulcer, limited to breakdown of skin ([facility] )    Debility       PAST MEDICAL HISTORY:  Past Medical History:   Diagnosis Date    Anxiety     Chronic atrial fibrillation (HCC)     Colonic polyp     COPD (chronic obstructive pulmonary disease) ([facility] [address] )     Diabetes mellitus type 2 in nonobese ([address] )     Diverticulosis     large intestine without hemmorhage    Gastric ulcer     Heart failure (HCC)     Hyperlipidemia     Hypertension     Iron deficiency anemia     Major depressive disorder     Peripheral vascular disease (HCC)     T12 compression fracture ([address] ) 3/1/2019       PAST SURGICAL HISTORY:  Past Surgical History:   Procedure Laterality Date    CAROTID ENDARTERECTOMY      CORONARY ANGIOPLASTY WITH STENT PLACEMENT      leg and chest    CORONARY ANGIOPLASTY WITH STENT PLACEMENT      adominal stent    CORONARY ARTERY BYPASS GRAFT      triple artery    HYSTERECTOMY      TX OPEN RX FEMUR FX+INTRAMED DEMETRIO Left 2021    Procedure: INSERTION NAIL IM FEMUR ANTEGRADE (TROCHANTERIC)- LEFT;  Surgeon: Sarah Chavez DO;  Location: [facility];  Service: Orthopedics    TONSILLECTOMY      VASCULAR SURGERY         FAMILY HISTORY:  Family History   Problem Relation Age of Onset    Lung cancer Mother     Migraines Mother     Stroke Brother     No Known Problems Father     No Known Problems Maternal Grandmother     No Known Problems Paternal Grandmother     No Known Problems Maternal Aunt     No Known Problems Maternal Aunt     No Known Problems Maternal Aunt     No Known Problems Maternal Aunt     No Known Problems Maternal Aunt     No Known Problems Maternal Aunt     Breast cancer Neg Hx        SOCIAL HISTORY:  Social History     Socioeconomic History    Marital status:      Spouse name: Not on file    Number of children: Not on file    Years of education: Not on file    Highest education level: Not on file   Occupational History     Comment: retired   Tobacco Use    Smoking status: Former Smoker     Quit date: 2021     Years since quittin 7    Smokeless tobacco: Never Used   Vaping Use    Vaping Use: Never used   Substance and Sexual Activity    Alcohol use:  Yes     Alcohol/week: 2 0 standard drinks     Types: 2 Glasses of wine per week    Drug use: No    Sexual activity: Not Currently     Partners: Male   Other Topics Concern    Not on file   Social History Narrative    Not on file     Social Determinants of Health     Financial Resource Strain: Not on file Food Insecurity: Not on file   Transportation Needs: No Transportation Needs    Lack of Transportation (Medical): No    Lack of Transportation (Non-Medical): No   Physical Activity: Not on file   Stress: Not on file   Social Connections: Not on file   Intimate Partner Violence: Not on file   Housing Stability: Not on file       Review of Systems   Respiratory: Negative for shortness of breath  Cardiovascular: Positive for chest pain  Gastrointestinal: Negative for abdominal pain  Objective:  Vitals:    03/02/22 1437   BP: 130/62   BP Location: Right arm   Patient Position: Sitting   Cuff Size: Standard   Pulse: 80   Resp: 20   SpO2: 90%   Weight: 60 3 kg (133 lb)   Height: 4' 11" (1 499 m)     Body mass index is 26 86 kg/m²  Physical Exam  Vitals and nursing note reviewed  Constitutional:       Appearance: Normal appearance  She is not ill-appearing  Comments:   Looks tired   Cardiovascular:      Rate and Rhythm: Normal rate and regular rhythm  Pulmonary:      Effort: Pulmonary effort is normal    Neurological:      Mental Status: Mental status is at baseline     Psychiatric:         Mood and Affect: Mood normal            RESULTS:    Recent Results (from the past 1008 hour(s))   CBC and differential    Collection Time: 02/01/22  4:22 PM   Result Value Ref Range    WBC 6 71 4 31 - 10 16 Thousand/uL    RBC 3 43 (L) 3 81 - 5 12 Million/uL    Hemoglobin 10 5 (L) 11 5 - 15 4 g/dL    Hematocrit 32 8 (L) 34 8 - 46 1 %    MCV 96 82 - 98 fL    MCH 30 6 26 8 - 34 3 pg    MCHC 32 0 31 4 - 37 4 g/dL    RDW 14 5 11 6 - 15 1 %    MPV 9 1 8 9 - 12 7 fL    Platelets 091 359 - 166 Thousands/uL    nRBC 0 /100 WBCs    Neutrophils Relative 61 43 - 75 %    Immat GRANS % 2 0 - 2 %    Lymphocytes Relative 17 14 - 44 %    Monocytes Relative 19 (H) 4 - 12 %    Eosinophils Relative 0 0 - 6 %    Basophils Relative 1 0 - 1 %    Neutrophils Absolute 4 05 1 85 - 7 62 Thousands/µL    Immature Grans Absolute 0 16 0 00 - 0  20 Thousand/uL    Lymphocytes Absolute 1 16 0 60 - 4 47 Thousands/µL    Monocytes Absolute 1 27 (H) 0 17 - 1 22 Thousand/µL    Eosinophils Absolute 0 03 0 00 - 0 61 Thousand/µL    Basophils Absolute 0 04 0 00 - 0 10 Thousands/µL       This note was created with voice recognition software  Phonic, grammatical and spelling errors may be present within the note as a result

## 2022-03-15 NOTE — TELEPHONE ENCOUNTER
Patient's son called and said that we should have received a fax on patient's pull ups  He is asking that this be done ASAP  He also said it should state on the fax that she needs the next size for the pull ups      Please advise

## 2022-04-11 NOTE — PROGRESS NOTES
Patient Name:  Anabel Burnett  MRN:  [id number]    Assessment & Plan     1  S/P ORIF (open reduction internal fixation) fracture  -     XR femur 2 vw left; Future; Expected date: 04/11/2022    2  Chronic left hip pain      34-year-old female now 6 months s/p left hip TFN performed 09/29/2021  The hip is doing well overall, x-rays reviewed and discussed in hte office today and show stable TFN with no signs of loosening or failure  Fracture is now healed  Patient does have continued pain and difficulty ambulating secondary to weakness  She should continue working on home exercises, WBAT  Recommended formal physical therapy but patient declined at this time  OTC analgesics as needed for pain  Ice/heat as directed  Will plan to see her back in 6 months for repeat x-rays of the left femur  History of the Present Illness   Anabel Burnett is a 79 y o  female 6 months s/p left hip TFN performed 9/29/2021  Patient is doing well overall in regards to the hip  She is using a wheelchair today and only occasionally gets up to use her walker at this point  Denies any real pain in the hip but does have some back pain that has been bothering her  Denies numbness and tingling  She is here today for repeat x-rays  Review of Systems     Review of Systems   Constitutional: Negative for appetite change and unexpected weight change  HENT: Negative for congestion and trouble swallowing  Eyes: Negative for visual disturbance  Respiratory: Negative for cough and shortness of breath  Cardiovascular: Negative for chest pain and palpitations  Gastrointestinal: Negative for nausea and vomiting  Endocrine: Negative for cold intolerance and heat intolerance  Skin: Negative for rash  Neurological: Negative for numbness         Physical Exam     BP (!) 182/70   Pulse 60   Ht 4' 11" (1 499 m)   Wt 60 3 kg (133 lb)   BMI 26 86 kg/m²     Left Hip:   Well-healed incision    No erythema warmth to palpation   Range of motion 100 degrees hip flexion passively   30 degrees external rotation passively   15 degrees internal rotation passively   No tenderness to palpation   able to perform straight leg raise   Able to actively flex and extend the knee   Able to actively dorsi and plantar flex the ankle   Sensation intact to L2-S1 dermatomes    Extremity warm and well perfused       Data Review     I have personally reviewed pertinent films in PACS, and my interpretation follows  X-rays of the left femur performed today demonstrate stable left TFN with no signs of loosening or failure  Healed intertrochanteric fracture  Social History     Tobacco Use    Smoking status: Former Smoker     Quit date: 2021     Years since quittin 8    Smokeless tobacco: Never Used   Vaping Use    Vaping Use: Never used   Substance Use Topics    Alcohol use: Yes     Alcohol/week: 2 0 standard drinks     Types: 2 Glasses of wine per week    Drug use: No     No procedures performed today      Scribe Attestation    I,:  Akanksha Ling am acting as a scribe while in the presence of the attending physician :       I,:  Russ Dee DO personally performed the services described in this documentation    as scribed in my presence :

## 2022-05-02 PROBLEM — R05.9 COUGH: Status: ACTIVE | Noted: 2022-01-01

## 2022-05-02 PROBLEM — M54.6 ACUTE RIGHT-SIDED THORACIC BACK PAIN: Status: ACTIVE | Noted: 2022-01-01

## 2022-05-02 NOTE — PATIENT INSTRUCTIONS
I am concerned that the back pain is a manifestation of her lung issue  She is in extreme pain today  I referred her to the emergency room and she should follow up with Dr Brenden Aj after discharge

## 2022-05-02 NOTE — TELEPHONE ENCOUNTER
pts son called, pt didn't want to wait long at [facility], so I spoke to [place],  I emphasized to pts son that she should really be in a hospital setting for this    To either try [facility], or theres [facility] e/[facility] down [address]

## 2022-05-02 NOTE — PROGRESS NOTES
BMI Counseling: Body mass index is 26 86 kg/m²  The BMI is above normal  Nutrition recommendations include decreasing portion sizes, encouraging healthy choices of fruits and vegetables, decreasing fast food intake, consuming healthier snacks, limiting drinks that contain sugar, moderation in carbohydrate intake, increasing intake of lean protein, reducing intake of saturated and trans fat and reducing intake of cholesterol  Exercise recommendations include exercising 3-5 times per week  No pharmacotherapy was ordered  Patient referred to PCP  Rationale for BMI follow-up plan is due to patient being overweight or obese  Assessment/Plan:    I am concerned that the back pain is a manifestation of her lung issue  She is in extreme pain today  I referred her to the emergency room and she should follow up with Dr Madina Ruff after discharge  Diagnoses and all orders for this visit:    Postmenopausal  -     DXA bone density spine hip and pelvis; Future    Cough  -     Cancel: XR chest pa & lateral; Future    Acute right-sided thoracic back pain    The patient was counseled regarding instructions for management, risk factor reductions, patient and family education,impressions, risks and benefits of treatment options, side effects of medications, importance of compliance with treatment  The treatment plan was reviewed with the patient/guardian and patient/guardian understands and agrees with the treatment plan          Current Outpatient Medications:     acetaminophen (TYLENOL) 325 mg tablet, Take 975 mg by mouth every 8 (eight) hours, Disp: , Rfl:     albuterol (PROVENTIL HFA,VENTOLIN HFA) 90 mcg/act inhaler, Inhale 2 puffs every 4 (four) hours as needed, Disp: , Rfl:     Alcohol Swabs (Pharmacist Choice Alcohol) PADS, 2 (two) times a day Use to test , Disp: , Rfl:     ALPRAZolam (XANAX) 0 25 mg tablet, Take 1 tablet (0 25 mg total) by mouth daily as needed for anxiety, Disp: 7 tablet, Rfl: 0    BD Pen Needle Prabha U/F 32G X 4 MM MISC, USE ONCE DAILY AS DIRECTED BY DOCTOR, Disp: 100 each, Rfl: 5    Blood Glucose Monitoring Suppl (ONE TOUCH ULTRA 2) w/Device KIT, 2 (two) times a day Use to test , Disp: , Rfl:     Continuous Blood Gluc  (FreeStyle Afsaneh [place]) MAURY, Use 1 Units 4 (four) times a day, Disp: 1 each, Rfl: 6    Continuous Blood Gluc Sensor (FreeStyle Afsaneh 14 Day Sensor) MISC, Use 1 each 4 (four) times a day, Disp: 1 each, Rfl: 6    Docusate Sodium (DSS) 100 MG CAPS, Take 100 mg by mouth 2 (two) times a day  , Disp: , Rfl:     DULoxetine (CYMBALTA) 60 mg delayed release capsule, Take 1 capsule (60 mg total) by mouth daily, Disp: 30 capsule, Rfl: 3    escitalopram (LEXAPRO) 10 mg tablet, TAKE 1 TABLET (10 MG TOTAL) BY MOUTH DAILY FOR 14 DAYS, Disp: 30 tablet, Rfl: 5    gabapentin (NEURONTIN) 100 mg capsule, TAKE ONE(1) CAPSULE BY MOUTH THREE TIMES A DAY, Disp: 270 capsule, Rfl: 0    insulin aspart (NovoLOG FlexPen) 100 UNIT/ML injection pen, Inject 12 Units under the skin 3 (three) times a day with meals, Disp: 15 mL, Rfl: 5    insulin detemir (Levemir FlexTouch) 100 Units/mL injection pen, Inject 28 Units under the skin daily, Disp: 15 mL, Rfl: 5    insulin lispro (HumaLOG) 100 units/mL injection, Inject 5 Units under the skin 3 (three) times a day with meals, Disp: 15 mL, Rfl: 3    isosorbide mononitrate (IMDUR) 30 mg 24 hr tablet, Take 30 mg by mouth daily, Disp: , Rfl:     Lancet Devices (Easy Mini Eject Lancing Device) MISC, 2 (two) times a day Use to test, Disp: , Rfl:     Lancets (ONETOUCH ULTRASOFT) lancets, Check sugar twice a day, Disp: 100 each, Rfl: 5    lidocaine (LIDODERM) 5 %, Apply 2 patches topically daily Remove & Discard patch within 12 hours or as directed by MD  Apply to Left hip area and Right foot, Disp: 30 patch, Rfl: 0    lisinopril (ZESTRIL) 5 mg tablet, Take 1 tablet (5 mg total) by mouth 2 (two) times a day, Disp: 60 tablet, Rfl: 5    metoprolol tartrate (LOPRESSOR) 50 mg tablet, Take 50 mg by mouth 2 (two) times a day, Disp: , Rfl:     Multiple Vitamins-Minerals (multivitamin with minerals) tablet, Take 1 tablet by mouth daily, Disp: , Rfl:     neomycin-polymyxin-dexamethasone (MAXITROL) 0 35%-10,000 units/g-0 1%, APPLY 1/2 RIBBIN TO AFFECTED EYES TWICE DAILY AS DIRECTED AFTER CLEANING LIDS, Disp: , Rfl:     nicotine (NICODERM CQ) 14 mg/24hr TD 24 hr patch, Place 1 patch on the skin every 24 hours, Disp: , Rfl:     nitroglycerin (NITROSTAT) 0 4 mg SL tablet, Place 1 tablet under the tongue once as needed  = may repeat every 5 minutes x 2 doses, Disp: , Rfl:     OneTouch Ultra test strip, use 1 TEST STRIP to TEST BLOOD SUGAR twice a day, Disp: 100 each, Rfl: 3    pantoprazole (PROTONIX) 40 mg tablet, Take 1 tablet (40 mg total) by mouth 2 (two) times a day, Disp: 28 tablet, Rfl: 0    Protein (PROSOURCE NO CARB PO), Take 30 mL by mouth 2 (two) times a day, Disp: , Rfl:     simvastatin (ZOCOR) 40 mg tablet, TAKE 1 TABLET (40 MG TOTAL) BY MOUTH DAILY, Disp: 90 tablet, Rfl: 3    Stiolto Respimat 2 5-2 5 MCG/ACT inhaler, INHALE 2 PUFFS INTO THE LUNGS DAILY, Disp: 4 g, Rfl: 3    traMADol (ULTRAM) 50 mg tablet, Take 1 tablet (50 mg total) by mouth every 8 (eight) hours as needed for moderate pain or severe pain, Disp: 20 tablet, Rfl: 0    Subjective:      Patient ID: Florestine Collet is a 79 y o  female  About two weeks of right upper back pain  Son states Tramadol is not effective  Upon chart review, she had a CT chest which did show a spiculated mass in the right upper lobe, for which she has been declining treatment  At her last office visit, documentation of a long discussion, culminating in a hospice referral was placed by her pcp        The following portions of the patient's history were reviewed and updated as appropriate:   She has a past medical history of Anxiety, Chronic atrial fibrillation ([address] ), Colonic polyp, COPD (chronic obstructive pulmonary disease) ([facility] ), Diabetes mellitus type 2 in nonobese [facility]), Diverticulosis, Gastric ulcer, Heart failure ([facility] ), Hyperlipidemia, Hypertension, Iron deficiency anemia, Major depressive disorder, Peripheral vascular disease ([facility] ), and T12 compression fracture ([facility] ) (3/1/2019)  ,  does not have any pertinent problems on file  ,   has a past surgical history that includes Coronary angioplasty with stent; Coronary angioplasty with stent; Coronary artery bypass graft; Carotid endarterectomy; Tonsillectomy; Vascular surgery; pr open rx femur fx+intramed gonzalo (Left, 9/29/2021); and Hysterectomy  ,  family history includes Lung cancer in her mother; Migraines in her mother; No Known Problems in her father, maternal aunt, maternal aunt, maternal aunt, maternal aunt, maternal aunt, maternal aunt, maternal grandmother, and paternal grandmother; Stroke in her brother  ,   reports that she quit smoking about a year ago  She has never used smokeless tobacco  She reports current alcohol use of about 2 0 standard drinks of alcohol per week  She reports that she does not use drugs  ,  is allergic to calcipotriene, lactose - food allergy, penicillin v, and propoxyphene       Review of Systems   Constitutional: Negative  Respiratory: Negative  Cardiovascular: Negative  Musculoskeletal: Positive for back pain  Psychiatric/Behavioral: Negative  Objective:  /76 (BP Location: Right arm, Patient Position: Sitting, Cuff Size: Adult)   Pulse 60   Temp 98 1 °F (36 7 °C) (Tympanic)   Resp 18   Ht 4' 11" (1 499 m)   SpO2 95%   BMI 26 86 kg/m²     Lab Review  No visits with results within 2 Month(s) from this visit     Latest known visit with results is:   Appointment on 02/01/2022   Component Date Value    WBC 02/01/2022 6 71     RBC 02/01/2022 3 43*    Hemoglobin 02/01/2022 10 5*    Hematocrit 02/01/2022 32 8*    MCV 02/01/2022 96     [address] 02/01/2022 30 6     MCHC 02/01/2022 32 0     RDW 02/01/2022 14 5     MPV 02/01/2022 9 1     Platelets 27/45/7224 211     nRBC 02/01/2022 0     Neutrophils Relative 02/01/2022 61     Immat GRANS % 02/01/2022 2     Lymphocytes Relative 02/01/2022 17     Monocytes Relative 02/01/2022 19*    Eosinophils Relative 02/01/2022 0     Basophils Relative 02/01/2022 1     Neutrophils Absolute 02/01/2022 4 05     Immature Grans Absolute 02/01/2022 0 16     Lymphocytes Absolute 02/01/2022 1 16     Monocytes Absolute 02/01/2022 1 27*    Eosinophils Absolute 02/01/2022 0 03     Basophils Absolute 02/01/2022 0 04         Imaging: XR femur 2 vw left    Result Date: 4/14/2022  Narrative: LEFT FEMUR INDICATION:   Z98 [id number]: Other specified postprocedural states Z87 81: Personal history of (healed) traumatic fracture  COMPARISON:  2/14/2022 VIEWS:  XR FEMUR 2 VW LEFT FINDINGS: Patient is again status post internal fixation of left proximal femoral fracture  Alignment is unchanged  Hardware appears intact  No significant degenerative changes  No lytic or blastic osseous lesion  Soft tissues are unremarkable  Impression: Stable appearance of healing left femoral fracture noted status post ORIF  Workstation performed: [id number]          Physical Exam  Constitutional:       General: She is in acute distress  Appearance: She is well-developed  She is ill-appearing  Comments: Moaning throughout exam    Cardiovascular:      Rate and Rhythm: Normal rate and regular rhythm  Heart sounds: Normal heart sounds  Pulmonary:      Effort: Pulmonary effort is normal       Breath sounds: Normal breath sounds  Musculoskeletal:      Comments: In wheel chair  Patient flinches when area of pain is lightly touched during exam    Neurological:      Mental Status: She is alert and oriented to person, place, and time  Deep Tendon Reflexes: Reflexes are normal and symmetric  Psychiatric:         Behavior: Behavior normal          Thought Content:  Thought content normal          Judgment: Judgment normal

## 2022-05-20 NOTE — TELEPHONE ENCOUNTER
Son called to see if she can have lidocaine patches for her pains in regards to appt today,   [facility] pharmacy if we can?

## 2022-05-20 NOTE — PROGRESS NOTES
Assessment/Plan:       Numerous possibilities  Her son is aware that the best place for her would be the ER but she will not go  I ordered labs and a chest x-ray if he can get her there  Also needs to get her oxygen machine fixed  May have to take her home 1st      Quality Measures:       No follow-ups on file  No problem-specific Assessment & Plan notes found for this encounter  Diagnoses and all orders for this visit:    Shortness of breath  -     CBC and differential; Future  -     Comprehensive metabolic panel; Future  -     XR chest pa & lateral; Future    Bilateral edema of lower extremity    Mass of right lung    Other orders  -     amitriptyline (ELAVIL) 50 mg tablet; TAKE 1 TABLET (50 MG TOTAL) BY MOUTH NIGHTLY FOR 14 DAYS  -     fentaNYL (DURAGESIC) 12 mcg/hr TD 72 hr patch; PLACE 1 PATCH ON THE SKIN EVERY THIRD DAY FOR 15 DAYS  MAX DAILY AMOUNT  1 PATCH  -     FeroSul 325 (65 Fe) MG tablet; Take 1 tablet by mouth daily with breakfast        Subjective:      Patient ID: Lyndon Kwan is a 79 y o  female  Patient comes in today with her son  They are complaining of increased swelling in her lower extremities  But while she was here her oxygen concentrator started over heating  They report her appetite has been poor for the last several days  Complaining of more right-sided chest pain  Her pulmonologist had started her on increased pain medicine but she could not tolerate that  Her son had to stop it  She had apparently agreed to the lung biopsy but only if it is done under general anesthesia  ALLERGIES:  Allergies   Allergen Reactions    Calcipotriene Hives    Lactose - Food Allergy GI Intolerance    Penicillin V Hives     Category:  Allergy;     Propoxyphene Rash       CURRENT MEDICATIONS:    Current Outpatient Medications:     acetaminophen (TYLENOL) 325 mg tablet, Take 975 mg by mouth every 8 (eight) hours, Disp: , Rfl:     albuterol (PROVENTIL HFA,VENTOLIN HFA) 90 mcg/act inhaler, Inhale 2 puffs every 4 (four) hours as needed, Disp: , Rfl:     Alcohol Swabs (Pharmacist Choice Alcohol) PADS, 2 (two) times a day Use to test , Disp: , Rfl:     ALPRAZolam (XANAX) 0 25 mg tablet, Take 1 tablet (0 25 mg total) by mouth daily as needed for anxiety, Disp: 7 tablet, Rfl: 0    amitriptyline (ELAVIL) 50 mg tablet, TAKE 1 TABLET (50 MG TOTAL) BY MOUTH NIGHTLY FOR 14 DAYS , Disp: , Rfl:     BD Pen Needle Prabha U/F 32G X 4 MM MISC, USE ONCE DAILY AS DIRECTED BY DOCTOR, Disp: 100 each, Rfl: 5    Blood Glucose Monitoring Suppl (ONE TOUCH ULTRA 2) w/Device KIT, 2 (two) times a day Use to test , Disp: , Rfl:     Continuous Blood Gluc  (FreeStyle Afsaneh [place]) MAURY, Use 1 Units 4 (four) times a day, Disp: 1 each, Rfl: 6    Continuous Blood Gluc Sensor (FreeStyle Afsaneh 14 Day Sensor) MISC, Use 1 each 4 (four) times a day, Disp: 1 each, Rfl: 6    Docusate Sodium (DSS) 100 MG CAPS, Take 100 mg by mouth 2 (two) times a day  , Disp: , Rfl:     DULoxetine (CYMBALTA) 60 mg delayed release capsule, Take 1 capsule (60 mg total) by mouth daily, Disp: 30 capsule, Rfl: 3    fentaNYL (DURAGESIC) 12 mcg/hr TD 72 hr patch, PLACE 1 PATCH ON THE SKIN EVERY THIRD DAY FOR 15 DAYS   MAX DAILY AMOUNT  1 PATCH, Disp: , Rfl:     FeroSul 325 (65 Fe) MG tablet, Take 1 tablet by mouth daily with breakfast, Disp: , Rfl:     gabapentin (NEURONTIN) 100 mg capsule, TAKE ONE(1) CAPSULE BY MOUTH THREE TIMES A DAY, Disp: 270 capsule, Rfl: 0    insulin aspart (NovoLOG FlexPen) 100 UNIT/ML injection pen, Inject 12 Units under the skin 3 (three) times a day with meals, Disp: 15 mL, Rfl: 5    insulin detemir (Levemir FlexTouch) 100 Units/mL injection pen, Inject 28 Units under the skin daily, Disp: 15 mL, Rfl: 5    insulin lispro (HumaLOG) 100 units/mL injection, Inject 5 Units under the skin 3 (three) times a day with meals, Disp: 15 mL, Rfl: 3    isosorbide mononitrate (IMDUR) 30 mg 24 hr tablet, Take 30 mg by mouth daily, Disp: , Rfl:     Lancet Devices (Easy Mini Eject Lancing Device) MISC, 2 (two) times a day Use to test, Disp: , Rfl:     Lancets (ONETOUCH ULTRASOFT) lancets, Check sugar twice a day, Disp: 100 each, Rfl: 5    lidocaine (LIDODERM) 5 %, Apply 2 patches topically daily Remove & Discard patch within 12 hours or as directed by MD  Apply to Left hip area and Right foot, Disp: 30 patch, Rfl: 0    lisinopril (ZESTRIL) 5 mg tablet, Take 1 tablet (5 mg total) by mouth 2 (two) times a day, Disp: 60 tablet, Rfl: 5    metoprolol tartrate (LOPRESSOR) 50 mg tablet, Take 50 mg by mouth 2 (two) times a day, Disp: , Rfl:     Multiple Vitamins-Minerals (multivitamin with minerals) tablet, Take 1 tablet by mouth daily, Disp: , Rfl:     neomycin-polymyxin-dexamethasone (MAXITROL) 0 35%-10,000 units/g-0 1%, APPLY 1/2 RIBBIN TO AFFECTED EYES TWICE DAILY AS DIRECTED AFTER CLEANING LIDS, Disp: , Rfl:     nicotine (NICODERM CQ) 14 mg/24hr TD 24 hr patch, Place 1 patch on the skin every 24 hours, Disp: , Rfl:     nitroglycerin (NITROSTAT) 0 4 mg SL tablet, Place 1 tablet under the tongue once as needed  = may repeat every 5 minutes x 2 doses, Disp: , Rfl:     OneTouch Ultra test strip, use 1 TEST STRIP to TEST BLOOD SUGAR twice a day, Disp: 100 each, Rfl: 3    pantoprazole (PROTONIX) 40 mg tablet, Take 1 tablet (40 mg total) by mouth 2 (two) times a day, Disp: 28 tablet, Rfl: 0    Protein (PROSOURCE NO CARB PO), Take 30 mL by mouth 2 (two) times a day, Disp: , Rfl:     simvastatin (ZOCOR) 40 mg tablet, TAKE 1 TABLET (40 MG TOTAL) BY MOUTH DAILY, Disp: 90 tablet, Rfl: 3    Stiolto Respimat 2 5-2 5 MCG/ACT inhaler, INHALE 2 PUFFS INTO THE LUNGS DAILY, Disp: 4 g, Rfl: 3    traMADol (ULTRAM) 50 mg tablet, Take 1 tablet (50 mg total) by mouth every 8 (eight) hours as needed for moderate pain or severe pain, Disp: 20 tablet, Rfl: 0    escitalopram (LEXAPRO) 10 mg tablet, TAKE 1 TABLET (10 MG TOTAL) BY MOUTH DAILY FOR 14 DAYS, Disp: 30 tablet, Rfl: 5    ACTIVE PROBLEM LIST:  Patient Active Problem List   Diagnosis    Allergic rhinitis    Atherosclerosis of native artery of both lower extremities with intermittent claudication ([facility]    Carotid stenosis, bilateral    Carpal tunnel syndrome on both sides    Anxiety and depression    Type 2 diabetes mellitus, with long-term current use of insulin ([facility] )    Diabetic peripheral neuropathy ([facility] )    Hyperlipidemia    Essential hypertension    Mesenteric artery stenosis (HCC)    Seborrheic keratosis    Skin lesion    Vitamin D deficiency    Cerebral infarction ([facility])    Peripheral neuropathy    Trigger little finger of right hand    Urinary incontinence in female    Vertigo    Degenerative joint disease (DJD) of lumbar spine    Pain of right hip joint    Gastrointestinal hemorrhage associated with gastric ulcer    Acute on chronic blood loss anemia    Chronic atrial fibrillation ([facility])    CAD (coronary artery disease) s/p CABG    Pacemaker    Elevated troponin    Abnormal CT of the chest    GI bleed    Closed intertrochanteric fracture of left femur ([facility])    Ambulatory dysfunction    COPD (chronic obstructive pulmonary disease) ([facility])    Acute kidney injury ([facility])    Hyponatremia    Hyperkalemia    Femoral-popliteal bypass graft occlusion, right, subsequent encounter    Acute encephalopathy    PAD (peripheral artery disease) ([facility])    Urinary tract infection    Pressure injury of left heel, stage 1    Pressure injury of buttock, stage 1    Activity intolerance    Surgical wound present    Pressure injury of buttock, stage 2 (HCC)    Pressure injury of sacral region, stage 2 (HCC)    Deep tissue injury    Traumatic skin ulcer, limited to breakdown of skin ([facility])    Debility    Cough    Acute right-sided thoracic back pain       PAST MEDICAL HISTORY:  Past Medical History:   Diagnosis Date    Anxiety     Chronic atrial fibrillation ([address] )     Colonic polyp     COPD (chronic obstructive pulmonary disease) (HCC)     Diabetes mellitus type 2 in nonobese (HCC)     Diverticulosis     large intestine without hemmorhage    Gastric ulcer     Heart failure (HCC)     Hyperlipidemia     Hypertension     Iron deficiency anemia     Major depressive disorder     Peripheral vascular disease (HCC)     T12 compression fracture (HCC) 3/1/2019       PAST SURGICAL HISTORY:  Past Surgical History:   Procedure Laterality Date    CAROTID ENDARTERECTOMY      CORONARY ANGIOPLASTY WITH STENT PLACEMENT      leg and chest    CORONARY ANGIOPLASTY WITH STENT PLACEMENT      adominal stent    CORONARY ARTERY BYPASS GRAFT      triple artery    HYSTERECTOMY      DC OPEN RX FEMUR FX+INTRAMED DEMETRIO Left 2021    Procedure: INSERTION NAIL IM FEMUR ANTEGRADE (TROCHANTERIC)- LEFT;  Surgeon: Adriana Mendes DO;  Location: [facility];  Service: Orthopedics    TONSILLECTOMY      VASCULAR SURGERY         FAMILY HISTORY:  Family History   Problem Relation Age of Onset    Lung cancer Mother     Migraines Mother     Stroke Brother     No Known Problems Father     No Known Problems Maternal Grandmother     No Known Problems Paternal Grandmother     No Known Problems Maternal Aunt     No Known Problems Maternal Aunt     No Known Problems Maternal Aunt     No Known Problems Maternal Aunt     No Known Problems Maternal Aunt     No Known Problems Maternal Aunt     Breast cancer Neg Hx        SOCIAL HISTORY:  Social History     Socioeconomic History    Marital status:       Spouse name: Not on file    Number of children: Not on file    Years of education: Not on file    Highest education level: Not on file   Occupational History     Comment: retired   Tobacco Use    Smoking status: Former Smoker     Quit date: 2021     Years since quittin 9    Smokeless tobacco: Never Used   Vaping Use    Vaping Use: Never used   Substance and Sexual Activity    Alcohol use: Yes     Alcohol/week: 2 0 standard drinks     Types: 2 Glasses of wine per week    Drug use: No    Sexual activity: Not Currently     Partners: Male   Other Topics Concern    Not on file   Social History Narrative    Not on file     Social Determinants of Health     Financial Resource Strain: Not on file   Food Insecurity: Not on file   Transportation Needs: No Transportation Needs    Lack of Transportation (Medical): No    Lack of Transportation (Non-Medical): No   Physical Activity: Not on file   Stress: Not on file   Social Connections: Not on file   Intimate Partner Violence: Not on file   Housing Stability: Not on file       Review of Systems   Respiratory: Negative for shortness of breath  Cardiovascular: Negative for chest pain  Gastrointestinal: Negative for abdominal pain  Objective:  Vitals:    05/20/22 1300   BP: 130/80   BP Location: Left arm   Patient Position: Sitting   Cuff Size: Adult   Pulse: 60   Resp: 14   Temp: (!) 97 2 °F (36 2 °C)   TempSrc: Tympanic   SpO2: 92%   Height: 4' 11" (1 499 m)     Body mass index is 26 86 kg/m²  Physical Exam  Vitals and nursing note reviewed  Constitutional:       Appearance: She is well-developed  She is ill-appearing  Cardiovascular:      Rate and Rhythm: Normal rate and regular rhythm  Heart sounds: Normal heart sounds  Pulmonary:      Comments:   Decreased breath sounds at both bases  Abdominal:      Palpations: Abdomen is soft  Tenderness: There is no abdominal tenderness  Musculoskeletal:      Right lower leg: Edema present  Left lower leg: Edema present  RESULTS:    No results found for this or any previous visit (from the past 1008 hour(s))  This note was created with voice recognition software  Phonic, grammatical and spelling errors may be present within the note as a result

## 2022-05-27 NOTE — TELEPHONE ENCOUNTER
Son called about the community pharmacy mentioning contacting us about the lidocain patches needing preauth  Did anyone start or can we, pt really would like them soon as they can get    He callbed back saying we rejected these with the pharmacy? I cant figure out which issue is actually true for these   Suzette Sruthi   anyone?

## 2022-05-27 NOTE — TELEPHONE ENCOUNTER
keke huerta left a voicemail about needing additional [facility]       They didn't say what info,  Said they faxed us request also, and said its time sensitive that they need the info by 10 am rody morning?     rossy hernandez     [phone] - [employer] phone  If we want to speak with them

## 2022-06-02 NOTE — TELEPHONE ENCOUNTER
denial for patches on file by keke,  Additional info came in and they are asking if we would like to appeal; the denial ?    I spoke to nursing and told them ,   Yep    please appeal with additional info    Lidocaine patches,  Was denied originally

## 2022-06-06 PROBLEM — D69.6 THROMBOCYTOPENIA (HCC): Status: ACTIVE | Noted: 2022-01-01

## 2022-06-06 PROBLEM — F11.20 CONTINUOUS OPIOID DEPENDENCE (HCC): Status: ACTIVE | Noted: 2022-01-01

## 2022-06-06 PROBLEM — Z93.6 OTHER ARTIFICIAL OPENINGS OF URINARY TRACT STATUS (HCC): Status: ACTIVE | Noted: 2022-01-01

## 2022-06-06 NOTE — PROGRESS NOTES
Assessment/Plan:      Annie Webster was at [facility] and scheduled for lung mass biopsy  Her BP was elevated at 444 systolic and her sugar was too high  The procedure was canceled and she was brought here for evaluation  They need to fix the oxygen concentrator  biopsy has been rescheduled for 6/20  Increase lisinopril to 10 mg  Discussed sugars  She bottoms out in the night time around 1-2 am  She does not eat well at night  We can decrease novolog to 8 units at night  Keep long acting insulin the same  Quality Measures:       Return for Next scheduled follow up  No problem-specific Assessment & Plan notes found for this encounter  Diagnoses and all orders for this visit:    Essential hypertension  -     lisinopril (ZESTRIL) 10 mg tablet; Take 1 tablet (10 mg total) by mouth daily    Type 2 diabetes mellitus with other specified complication, with long-term current use of insulin (HCC)  -     insulin aspart (NovoLOG FlexPen) 100 UNIT/ML injection pen; Inject 8 Units under the skin 3 (three) times a day with meals    Chronic obstructive pulmonary disease, unspecified COPD type ([facility])    Other orders  -     oxyCODONE (ROXICODONE) 10 MG TABS; TAKE 1 TABLET (10 MG TOTAL) BY MOUTH EVERY 6 (SIX) HOURS AS NEEDED FOR MODERATE PAIN (PAIN SCORE 4-6)  MAX DAILY AMOUNT  40 MG          Subjective:      Patient ID: Iman May is a 79 y o  female  Annie Webster is here with sick complaints  ALLERGIES:  Allergies   Allergen Reactions    Calcipotriene Hives    Lactose - Food Allergy GI Intolerance    Penicillin V Hives     Category:  Allergy;     Propoxyphene Rash       CURRENT MEDICATIONS:    Current Outpatient Medications:     acetaminophen (TYLENOL) 325 mg tablet, Take 975 mg by mouth every 8 (eight) hours, Disp: , Rfl:     albuterol (PROVENTIL HFA,VENTOLIN HFA) 90 mcg/act inhaler, Inhale 2 puffs every 4 (four) hours as needed, Disp: , Rfl:     Alcohol Swabs (Pharmacist Choice Alcohol) PADS, 2 (two) times a day Use to test , Disp: , Rfl:     BD Pen Needle Prabha U/F 32G X 4 MM MISC, USE ONCE DAILY AS DIRECTED BY DOCTOR, Disp: 100 each, Rfl: 5    Blood Glucose Monitoring Suppl (ONE TOUCH ULTRA 2) w/Device KIT, 2 (two) times a day Use to test , Disp: , Rfl:     Continuous Blood Gluc  (FreeStyle Afsaneh Whitmer) MAURY, Use 1 Units 4 (four) times a day, Disp: 1 each, Rfl: 6    Continuous Blood Gluc Sensor (FreeStyle Afsaneh 14 Day Sensor) MISC, Use 1 each 4 (four) times a day, Disp: 1 each, Rfl: 6    DULoxetine (CYMBALTA) 60 mg delayed release capsule, Take 1 capsule (60 mg total) by mouth daily, Disp: 30 capsule, Rfl: 3    escitalopram (LEXAPRO) 10 mg tablet, TAKE 1 TABLET (10 MG TOTAL) BY MOUTH DAILY FOR 14 DAYS, Disp: 30 tablet, Rfl: 5    FeroSul 325 (65 Fe) MG tablet, Take 1 tablet by mouth daily with breakfast, Disp: , Rfl:     gabapentin (NEURONTIN) 100 mg capsule, TAKE ONE(1) CAPSULE BY MOUTH THREE TIMES A DAY, Disp: 270 capsule, Rfl: 0    insulin aspart (NovoLOG FlexPen) 100 UNIT/ML injection pen, Inject 8 Units under the skin 3 (three) times a day with meals, Disp: 15 mL, Rfl: 5    insulin detemir (Levemir FlexTouch) 100 Units/mL injection pen, Inject 28 Units under the skin daily, Disp: 15 mL, Rfl: 5    isosorbide mononitrate (IMDUR) 30 mg 24 hr tablet, Take 30 mg by mouth daily, Disp: , Rfl:     Lancet Devices (Easy Mini Eject Lancing Device) MISC, 2 (two) times a day Use to test, Disp: , Rfl:     Lancets (ONETOUCH ULTRASOFT) lancets, Check sugar twice a day, Disp: 100 each, Rfl: 5    lidocaine (LIDODERM) 5 %, Apply 2 patches topically in the morning   Remove & Discard patch within 12 hours or as directed by MD  Apply to Left hip area and Right foot , Disp: 30 patch, Rfl: 3    lisinopril (ZESTRIL) 10 mg tablet, Take 1 tablet (10 mg total) by mouth daily, Disp: 30 tablet, Rfl: 0    metoprolol tartrate (LOPRESSOR) 50 mg tablet, Take 50 mg by mouth 2 (two) times a day, Disp: , Rfl:    Multiple Vitamins-Minerals (multivitamin with minerals) tablet, Take 1 tablet by mouth daily, Disp: , Rfl:     neomycin-polymyxin-dexamethasone (MAXITROL) 0 35%-10,000 units/g-0 1%, APPLY 1/2 RIBBIN TO AFFECTED EYES TWICE DAILY AS DIRECTED AFTER CLEANING LIDS, Disp: , Rfl:     nitroglycerin (NITROSTAT) 0 4 mg SL tablet, Place 1 tablet under the tongue once as needed  = may repeat every 5 minutes x 2 doses, Disp: , Rfl:     OneTouch Ultra test strip, use 1 TEST STRIP to TEST BLOOD SUGAR twice a day, Disp: 100 each, Rfl: 3    oxyCODONE (ROXICODONE) 10 MG TABS, TAKE 1 TABLET (10 MG TOTAL) BY MOUTH EVERY 6 (SIX) HOURS AS NEEDED FOR MODERATE PAIN (PAIN SCORE 4-6)   MAX DAILY AMOUNT  40 MG, Disp: , Rfl:     pantoprazole (PROTONIX) 40 mg tablet, Take 1 tablet (40 mg total) by mouth 2 (two) times a day, Disp: 28 tablet, Rfl: 0    Protein (PROSOURCE NO CARB PO), Take 30 mL by mouth 2 (two) times a day, Disp: , Rfl:     simvastatin (ZOCOR) 40 mg tablet, TAKE 1 TABLET (40 MG TOTAL) BY MOUTH DAILY, Disp: 90 tablet, Rfl: 3    Stiolto Respimat 2 5-2 5 MCG/ACT inhaler, INHALE 2 PUFFS INTO THE LUNGS DAILY, Disp: 4 g, Rfl: 3    ALPRAZolam (XANAX) 0 25 mg tablet, Take 1 tablet (0 25 mg total) by mouth daily as needed for anxiety (Patient not taking: Reported on 6/6/2022), Disp: 7 tablet, Rfl: 0    amitriptyline (ELAVIL) 50 mg tablet, TAKE 1 TABLET (50 MG TOTAL) BY MOUTH NIGHTLY FOR 14 DAYS  (Patient not taking: Reported on 6/6/2022), Disp: , Rfl:     traMADol (ULTRAM) 50 mg tablet, Take 1 tablet (50 mg total) by mouth every 8 (eight) hours as needed for moderate pain or severe pain (Patient not taking: Reported on 6/6/2022), Disp: 20 tablet, Rfl: 0    ACTIVE PROBLEM LIST:  Patient Active Problem List   Diagnosis    Allergic rhinitis    Atherosclerosis of native artery of both lower extremities with intermittent claudication (HCC)    Carotid stenosis, bilateral    Carpal tunnel syndrome on both sides    Anxiety and depression    Type 2 diabetes mellitus, with long-term current use of insulin (HCC)    Diabetic peripheral neuropathy (HCC)    Hyperlipidemia    Essential hypertension    Mesenteric artery stenosis (HCC)    Seborrheic keratosis    Skin lesion    Vitamin D deficiency    Cerebral infarction (HCC)    Peripheral neuropathy    Trigger little finger of right hand    Urinary incontinence in female    Vertigo    Degenerative joint disease (DJD) of lumbar spine    Pain of right hip joint    Gastrointestinal hemorrhage associated with gastric ulcer    Acute on chronic blood loss anemia    Chronic atrial fibrillation (HCC)    CAD (coronary artery disease) s/p CABG    Pacemaker    Elevated troponin    Abnormal CT of the chest    GI bleed    Closed intertrochanteric fracture of left femur (HCC)    Ambulatory dysfunction    COPD (chronic obstructive pulmonary disease) (HCC)    Acute kidney injury (HCC)    Hyponatremia    Hyperkalemia    Femoral-popliteal bypass graft occlusion, right, subsequent encounter    Acute encephalopathy    PAD (peripheral artery disease) (HCC)    Urinary tract infection    Pressure injury of left heel, stage 1    Pressure injury of buttock, stage 1    Activity intolerance    Surgical wound present    Pressure injury of buttock, stage 2 (HCC)    Pressure injury of sacral region, stage 2 (HCC)    Deep tissue injury    Traumatic skin ulcer, limited to breakdown of skin (HCC)    Debility    Cough    Acute right-sided thoracic back pain    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)    Continuous opioid dependence (HCC)    Thrombocytopenia (HCC)       PAST MEDICAL HISTORY:  Past Medical History:   Diagnosis Date    Anxiety     Chronic atrial fibrillation (HCC)     Colonic polyp     COPD (chronic obstructive pulmonary disease) (HCC)     Diabetes mellitus type 2 in nonobese ([address] )     Diverticulosis     large intestine without hemmorhage    Gastric ulcer     Heart failure (HCC)     Hyperlipidemia     Hypertension     Iron deficiency anemia     Major depressive disorder     Peripheral vascular disease (HCC)     T12 compression fracture ([address] ) 3/1/2019       PAST SURGICAL HISTORY:  Past Surgical History:   Procedure Laterality Date    CAROTID ENDARTERECTOMY      CORONARY ANGIOPLASTY WITH STENT PLACEMENT      leg and chest    CORONARY ANGIOPLASTY WITH STENT PLACEMENT      adominal stent    CORONARY ARTERY BYPASS GRAFT      triple artery    HYSTERECTOMY      CA OPEN RX FEMUR FX+INTRAMED DEMETRIO Left 2021    Procedure: INSERTION NAIL IM FEMUR ANTEGRADE (TROCHANTERIC)- LEFT;  Surgeon: Bernard Paget, DO;  Location: [facility];  Service: Orthopedics    TONSILLECTOMY      VASCULAR SURGERY         FAMILY HISTORY:  Family History   Problem Relation Age of Onset    Lung cancer Mother     Migraines Mother     Stroke Brother     No Known Problems Father     No Known Problems Maternal Grandmother     No Known Problems Paternal Grandmother     No Known Problems Maternal Aunt     No Known Problems Maternal Aunt     No Known Problems Maternal Aunt     No Known Problems Maternal Aunt     No Known Problems Maternal Aunt     No Known Problems Maternal Aunt     Breast cancer Neg Hx        SOCIAL HISTORY:  Social History     Socioeconomic History    Marital status:      Spouse name: Not on file    Number of children: Not on file    Years of education: Not on file    Highest education level: Not on file   Occupational History     Comment: retired   Tobacco Use    Smoking status: Former Smoker     Quit date: 2021     Years since quittin 0    Smokeless tobacco: Never Used   Vaping Use    Vaping Use: Never used   Substance and Sexual Activity    Alcohol use:  Yes     Alcohol/week: 2 0 standard drinks     Types: 2 Glasses of wine per week    Drug use: No    Sexual activity: Not Currently     Partners: Male   Other Topics Concern    Not on file   Social History Narrative    Not on file     Social Determinants of Health     Financial Resource Strain: Not on file   Food Insecurity: Not on file   Transportation Needs: No Transportation Needs    Lack of Transportation (Medical): No    Lack of Transportation (Non-Medical): No   Physical Activity: Not on file   Stress: Not on file   Social Connections: Not on file   Intimate Partner Violence: Not on file   Housing Stability: Not on file       Review of Systems   Respiratory: Positive for cough and shortness of breath  All other systems reviewed and are negative  Objective:  Vitals:    06/06/22 1054 06/06/22 1124   BP: (!) 178/78 168/70   BP Location: Left arm    Patient Position: Sitting    Cuff Size: Standard    Pulse: 91    Resp: 14    Temp: 98 6 °F (37 °C)    TempSrc: Tympanic    SpO2: 93%    Height: 4' 11" (1 499 m)      Body mass index is 26 86 kg/m²  Physical Exam  Vitals and nursing note reviewed  Constitutional:       Appearance: Normal appearance  HENT:      Head: Normocephalic and atraumatic  Nose: Nose normal    Cardiovascular:      Rate and Rhythm: Normal rate  Pulmonary:      Breath sounds: Rhonchi present  Musculoskeletal:         General: Normal range of motion  Cervical back: Normal range of motion  Skin:     General: Skin is warm and dry  Neurological:      Mental Status: She is alert  Mental status is at baseline     Psychiatric:         Mood and Affect: Mood normal            RESULTS:    Recent Results (from the past 1008 hour(s))   CBC and differential    Collection Time: 05/20/22  1:42 PM   Result Value Ref Range    WBC 8 03 4 31 - 10 16 Thousand/uL    RBC 3 17 (L) 3 81 - 5 12 Million/uL    Hemoglobin 9 3 (L) 11 5 - 15 4 g/dL    Hematocrit 30 2 (L) 34 8 - 46 1 %    MCV 95 82 - 98 fL    MCH 29 3 26 8 - 34 3 pg    MCHC 30 8 (L) 31 4 - 37 4 g/dL    RDW 15 1 11 6 - 15 1 %    MPV 8 9 8 9 - 12 7 fL    Platelets 755 693 - 945 Thousands/uL    nRBC 0 /100 WBCs    Neutrophils Relative 72 43 - 75 %    Immat GRANS % 2 0 - 2 %    Lymphocytes Relative 12 (L) 14 - 44 %    Monocytes Relative 14 (H) 4 - 12 %    Eosinophils Relative 0 0 - 6 %    Basophils Relative 0 0 - 1 %    Neutrophils Absolute 5 76 1 85 - 7 62 Thousands/µL    Immature Grans Absolute 0 14 0 00 - 0 20 Thousand/uL    Lymphocytes Absolute 0 95 0 60 - 4 47 Thousands/µL    Monocytes Absolute 1 13 0 17 - 1 22 Thousand/µL    Eosinophils Absolute 0 03 0 00 - 0 61 Thousand/µL    Basophils Absolute 0 02 0 00 - 0 10 Thousands/µL   Comprehensive metabolic panel    Collection Time: 05/20/22  1:42 PM   Result Value Ref Range    Sodium 132 (L) 136 - 145 mmol/L    Potassium 5 6 (H) 3 5 - 5 3 mmol/L    Chloride 97 (L) 100 - 108 mmol/L    CO2 26 21 - 32 mmol/L    ANION GAP 9 4 - 13 mmol/L    BUN 17 5 - 25 mg/dL    Creatinine 0 88 0 60 - 1 30 mg/dL    Glucose 243 (H) 65 - 140 mg/dL    Calcium 8 8 8 3 - 10 1 mg/dL    Corrected Calcium 10 2 (H) 8 3 - 10 1 mg/dL    AST 10 5 - 45 U/L    ALT 7 (L) 12 - 78 U/L    Alkaline Phosphatase 62 46 - 116 U/L    Total Protein 7 1 6 4 - 8 2 g/dL    Albumin 2 3 (L) 3 5 - 5 0 g/dL    Total Bilirubin 0 96 0 20 - 1 00 mg/dL    eGFR 60 ml/min/1 73sq m       This note was created with voice recognition software  Phonic, grammatical and spelling errors may be present within the note as a result

## 2022-06-09 NOTE — TELEPHONE ENCOUNTER
----- Message from Eva Bumpers, MD sent at 5/11/2021  4:37 PM EDT -----  I put in order for CBC but it may be drawn by visiting nurses so we may need to fax that order to them  Patient presents today for presurgical teaching with her , Demarcus. She was instructed regarding her upcoming surgery with Dr. Ellison. She will need to use the presurgical sunny wipes the night before and prior to arrival the day of surgery, discussed importance of the wipes to minimize the risk of post-operative infection. Reviewed NPO status (nothing after midnight), presurgical instructions and expected post-operative course, including activity (arm use) and weight lifting limitations.  She was advised she may need OT after surgery; it will start after the drains are removed (if applicable); the number of visits will be determined based on her individual need. She  verbalized understanding. She was encouraged to call the office if she has any questions or concerns prior to surgery.     Sunny wipes and surgical packet dispensed.      Consent was not signed in EPIC.

## (undated) DEVICE — PAD CAST 4 IN COTTON NON STERILE

## (undated) DEVICE — PLUMEPEN PRO 10FT

## (undated) DEVICE — STERILE ORIF HIP PACK: Brand: CARDINAL HEALTH

## (undated) DEVICE — 3M™ TEGADERM™ TRANSPARENT FILM DRESSING FRAME STYLE, 1626W, 4 IN X 4-3/4 IN (10 CM X 12 CM), 50/CT 4CT/CASE: Brand: 3M™ TEGADERM™

## (undated) DEVICE — SPONGE SCRUB 4 PCT CHLORHEXIDINE

## (undated) DEVICE — DRESSING MEPILEX AG BORDER 4 X 4 IN

## (undated) DEVICE — PAD GROUNDING ADULT

## (undated) DEVICE — ABDOMINAL PAD: Brand: DERMACEA

## (undated) DEVICE — 3.2MM GUIDE WIRE 400MM

## (undated) DEVICE — INTENDED FOR TISSUE SEPARATION, AND OTHER PROCEDURES THAT REQUIRE A SHARP SURGICAL BLADE TO PUNCTURE OR CUT.: Brand: BARD-PARKER SAFETY BLADES SIZE 10, STERILE

## (undated) DEVICE — INTENDED FOR TISSUE SEPARATION, AND OTHER PROCEDURES THAT REQUIRE A SHARP SURGICAL BLADE TO PUNCTURE OR CUT.: Brand: BARD-PARKER SAFETY BLADES SIZE 15, STERILE

## (undated) DEVICE — DRESSING MEPILEX AG BORDER 4 X 8 IN

## (undated) DEVICE — SUT VICRYL PLUS 2-0 CTB-1 27 IN VCPB259H

## (undated) DEVICE — ARTHROSCOPY FLOOR MAT

## (undated) DEVICE — 4.2MM THREE-FLUTED DRILL BIT QC/NEEDLE POINT/145MM

## (undated) DEVICE — SUT VICRYL PLUS 0 CTB-1 27 IN VCPB260H

## (undated) DEVICE — 2.5MM REAMING ROD WITH BALL TIP/950MM-STERILE

## (undated) DEVICE — 6617 IOBAN II PATIENT ISOLATION DRAPE 5/BX,4BX/CS: Brand: STERI-DRAPE™ IOBAN™ 2

## (undated) DEVICE — CHLORAPREP HI-LITE 26ML ORANGE

## (undated) DEVICE — DRAPE C-ARMOUR

## (undated) DEVICE — DRAPE SURGIKIT SADDLE BAG

## (undated) DEVICE — GLOVE SRG BIOGEL 7.5

## (undated) DEVICE — GLOVE SRG BIOGEL ORTHOPEDIC 7.5